# Patient Record
Sex: FEMALE | Race: WHITE | Employment: OTHER | URBAN - METROPOLITAN AREA
[De-identification: names, ages, dates, MRNs, and addresses within clinical notes are randomized per-mention and may not be internally consistent; named-entity substitution may affect disease eponyms.]

---

## 2018-06-28 ENCOUNTER — TRANSFERRED RECORDS (OUTPATIENT)
Dept: HEALTH INFORMATION MANAGEMENT | Facility: CLINIC | Age: 63
End: 2018-06-28

## 2018-08-06 ENCOUNTER — TRANSFERRED RECORDS (OUTPATIENT)
Dept: HEALTH INFORMATION MANAGEMENT | Facility: CLINIC | Age: 63
End: 2018-08-06

## 2018-08-09 ENCOUNTER — TRANSFERRED RECORDS (OUTPATIENT)
Dept: HEALTH INFORMATION MANAGEMENT | Facility: CLINIC | Age: 63
End: 2018-08-09

## 2018-08-09 ENCOUNTER — MEDICAL CORRESPONDENCE (OUTPATIENT)
Dept: HEALTH INFORMATION MANAGEMENT | Facility: CLINIC | Age: 63
End: 2018-08-09

## 2018-08-22 ENCOUNTER — PRE VISIT (OUTPATIENT)
Dept: OTOLARYNGOLOGY | Facility: CLINIC | Age: 63
End: 2018-08-22

## 2018-08-22 NOTE — TELEPHONE ENCOUNTER
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 Steen  Who called:Patient    Recent Studies  Imaging:  Pathology:  Labs:  Previous chemo/radiation (if known):    Slides requested from [facility]ly:  Records received from:    Additional information:Records from Alexy sent to HIM for scanning    Images in PACS

## 2018-08-23 PROCEDURE — 00000346 ZZHCL STATISTIC REVIEW OUTSIDE SLIDES TC 88321: Performed by: OTOLARYNGOLOGY

## 2018-08-24 LAB — COPATH REPORT: NORMAL

## 2018-08-29 ENCOUNTER — HEALTH MAINTENANCE LETTER (OUTPATIENT)
Age: 63
End: 2018-08-29

## 2018-09-05 ENCOUNTER — OFFICE VISIT (OUTPATIENT)
Dept: OTOLARYNGOLOGY | Facility: CLINIC | Age: 63
End: 2018-09-05
Payer: COMMERCIAL

## 2018-09-05 ENCOUNTER — ALLIED HEALTH/NURSE VISIT (OUTPATIENT)
Dept: SPEECH THERAPY | Facility: CLINIC | Age: 63
End: 2018-09-05

## 2018-09-05 VITALS — WEIGHT: 210 LBS | BODY MASS INDEX: 34.99 KG/M2 | HEIGHT: 65 IN

## 2018-09-05 DIAGNOSIS — C05.0 CANCER OF HARD PALATE (H): Primary | ICD-10-CM

## 2018-09-05 RX ORDER — LISINOPRIL/HYDROCHLOROTHIAZIDE 10-12.5 MG
1 TABLET ORAL EVERY MORNING
COMMUNITY
Start: 2018-09-04 | End: 2018-10-29

## 2018-09-05 RX ORDER — ASPIRIN 325 MG
325 TABLET, DELAYED RELEASE (ENTERIC COATED) ORAL EVERY MORNING
COMMUNITY
End: 2018-10-29

## 2018-09-05 RX ORDER — NICOTINE 21 MG/24HR
1 PATCH, TRANSDERMAL 24 HOURS TRANSDERMAL EVERY 24 HOURS
Refills: 6 | COMMUNITY
Start: 2018-08-14 | End: 2018-10-29

## 2018-09-05 RX ORDER — OMEPRAZOLE 20 MG
1000 TABLET, DELAYED RELEASE (ENTERIC COATED) ORAL 2 TIMES DAILY
COMMUNITY
Start: 2016-09-12 | End: 2018-10-23

## 2018-09-05 ASSESSMENT — PAIN SCALES - GENERAL: PAINLEVEL: NO PAIN (0)

## 2018-09-05 NOTE — PROGRESS NOTES
Patient seen for allied healthcare provider visit.  Introduced self and SLP services for following surgery.  Patient reports no difficulty swallowing at this time.  Patient agreeable to speech pathology services for swallowing and speech if problems occur due to reconstruction.  Patient reports feeling a little overwhelmed with the information provided by physician this date.  Anticipate full clinical swallow evaluation following surgery as cleared by ENT.  Time spent with patient 4 minutes.

## 2018-09-05 NOTE — MR AVS SNAPSHOT
After Visit Summary   9/5/2018    Ms. Martita Burden    MRN: [id number]           Patient Information     Date Of Birth          1955        Visit Information        Provider Department      9/5/2018 3:49 PM Suzan Gonzalez SLP [facility]         Follow-ups after your visit        Your next 10 appointments already scheduled     Sep 17, 2018 10:00 AM CDT   (Arrive by 9:45 AM)   PAC EVALUATION with PARVEZ [facility] ([facility])    [address] [place] [address]  4th [facility] [address] [phone]              Who to contact     Please call your clinic at [phone] to:    Ask questions about your health    Make or cancel appointments    Discuss your medicines    Learn about your test results    Speak to your doctor            Additional Information About Your Visit        MyChart Information     Mechiot gives you secure access to your electronic health record. If you see a primary care provider, you can also send messages to your care team and make appointments. If you have questions, please call your primary care clinic.  If you do not have a primary care provider, please call [phone] and they will assist you.      LoopIt is an electronic gateway that provides easy, online access to your medical records. With LoopIt, you can request a clinic appointment, read your test results, renew a prescription or communicate with your care team.     To access your existing account, please contact your [facility] or call [phone] for assistance.        Care EveryWhere ID     This is your Care EveryWhere ID. This could be used by other organizations to access your [facility] medical records  [id number]         Blood Pressure from Last 3 Encounters:   No data found for BP    Weight from Last 3 Encounters:   09/05/18 95.3 kg (210 lb)              Today, you had the following      No orders found for display       Primary Care Provider    None Specified       No primary provider on file.        Equal Access to Services     JJ CARRION : Hadii aad ku hadsaundraharpal Newell, wanader antonio, benjikathie downeyjhonnybola ding, karolina carlsonjoecarlota urena. So [facility] [phone].    ATENCIÓN: Si habla español, tiene a robison disposición servicios gratuitos de asistencia lingüística. Llame al [phone].    We comply with applicable federal civil rights laws and [place] laws. We do not discriminate on the basis of race, color, national origin, age, disability, sex, sexual orientation, or gender identity.            Thank you!     Thank you for choosing [facility]  for your care. Our goal is always to provide you with excellent care. Hearing back from our patients is one way we can continue to improve our services. Please take a few minutes to complete the written survey that you may receive in the mail after your visit with us. Thank you!             Your Updated Medication List - Protect others around you: Learn how to safely use, store and throw away your medicines at www.disposemymeds.org.          This list is accurate as of 9/5/18  3:51 PM.  Always use your most recent med list.                   Brand Name Dispense Instructions for use Diagnosis    aspirin 325 MG EC tablet           Calcium-Magnesium-Zinc 333-133-5 MG Tabs per tablet           CVS FLAXSEED OIL 1000 MG Caps           lisinopril-hydrochlorothiazide 10-12.5 MG per tablet    PRINZIDE/ZESTORETIC          nicotine 14 MG/24HR 24 hr patch    NICODERM CQ

## 2018-09-05 NOTE — PATIENT INSTRUCTIONS
1. We will call you on Friday after tumor board with recommendations as well as to confirm time for surgery.   2. Please call the ENT clinic with any questions,concerns, new or worsening symptoms.    -Clinic number is [phone]   - Camryn's direct line (Dr. Garza's nurse) [phone]    3. We have you scheduled to return to clinic to meet with Dr. Gutierrez on Monday 9/17 at 10:20am.    4.You are also scheduled with our pre-op clinic on 9/17 at 11:00am

## 2018-09-05 NOTE — NURSING NOTE
Teaching Flowsheet - ENT   Relevant Diagnosis: cancer of hard palate  Teaching Topic: right maxillectomy, right neck exploration, radial forearm free-flap  Person(s) involved in teaching: patient        Motivation Level:  Asks Questions:   Yes  Eager to Learn:   Yes  Cooperative:   Yes  Receptive (willing/able to accept information):   Yes  Comments: Reviewed pre-op H and P,  NPO prior to  surgery,  pre-op scrub (given Hibiclens)  Reviewed post-op  cares , activity and pain.     Patient demonstrates understanding of the following:  Reason for the appointment, diagnosis and treatment plan:   Yes  Knowledge of proper use of medications and conditions for which they are ordered (with special attention to potential side effects or drug interactions):  stop aspirin products 1 week before surgery Yes  Which situations necessitate calling provider and whom to contact:   Yes  Nutritional needs and diet plan:   Yes  Pain management techniques:   Yes  Patient instructed on hand hygiene:  Yes  How and/when to access community resources:   Yes     Infection Prevention:  Patient   demonstrates understanding of the following:  Surgical procedure site care taught Yes  Signs and symptoms of infection taught Yes  Wound care taught Yes  Instructional Materials Used/Given: pre- op booklet,verbal  Instruction.    Camryn Rivera, RN, BSN

## 2018-09-05 NOTE — NURSING NOTE
Chief Complaint   Patient presents with     Consult     new adenocarcinoma of hard palate.      Matthew Mott, EMT

## 2018-09-05 NOTE — LETTER
9/5/2018       RE: Martita Burden  [address] [facility] [address]     Dear Colleague,    Thank you for referring your patient, Martita Burden, to the [facility] EAR NOSE AND THROAT at [facility]. Please see a copy of my visit note below.    Dear Dr. Steen:    I had the pleasure of meeting Martita Burden in consultation today at the [facility] at your request.     History of Present Illness:   Patient is a 62-year-old woman who is referred for evaluation of a right palate mass.  She says that this was initially noticed by her dentist about a year ago.  She had some issues with her insurance allowing her to be seen.  This sounds like this was both here as well as locally (unclear if this was for ENT clinic versus oral surgery).  She was then seen by Dr. Steen and was going to undergo a maxillectomy.  Unfortunately her insurance company would not cover her obturator..  She was referred down here for possible maxillectomy with free flap reconstruction.  She says that there has not been any significant changes in the mass since it was first noticed.  Is not causing her any pain.  It is not interfering with her ability to eat.  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  She had a PET CT scan which showed a mass in the palate but no nodes.  There was bony erosion of the maxillary sinus floor.  Next    The patient is right-handed.    Family history: Negative    Social history: She was previously a 2.5 pack per day smoker and is currently doing about 5 cigarettes per day with use of the patch.  She denies any chewing tobacco use.  She denies any alcohol use.  She lives in the same building as her daughter and grandchildren.  She works in a greenhouse.    Past medical history: Hypertension    Past surgical history: Left ankle fusion, hysterectomy        MEDICATIONS:     Current Outpatient  Prescriptions   Medication Sig Dispense Refill     aspirin 325 MG EC tablet        Calcium-Magnesium-Zinc 333-133-5 MG TABS per tablet        Flaxseed, Linseed, (CVS FLAXSEED OIL) 1000 MG CAPS        lisinopril-hydrochlorothiazide (PRINZIDE/ZESTORETIC) 10-12.5 MG per tablet        nicotine (NICODERM CQ) 14 MG/24HR 24 hr patch   6       ALLERGIES:    Allergies   Allergen Reactions     Penicillins      Sulfa Drugs        HABITS/SOCIAL HISTORY:   She was previously a 2.5 pack per day smoker and is currently doing about 5 cigarettes per day with use of the patch.  She denies any chewing tobacco use.  She denies any alcohol use.   She lives in the same building as her daughter and grandchildren.    She works in a greenhouse.    Social History     Social History     Marital status: Single     Spouse name: N/A     Number of children: N/A     Years of education: N/A     Occupational History     Not on file.     Social History Main Topics     Smoking status: Light Tobacco Smoker     Packs/day: 0.50     Years: 30.00     Types: Cigarettes     Smokeless tobacco: Never Used     Alcohol use No     Drug use: No     Sexual activity: No     Other Topics Concern     Not on file     Social History Narrative     No narrative on file       PAST MEDICAL HISTORY:   Past Medical History:   Diagnosis Date     Allergic rhinitis     Seasonal     Cancer (H) April     Hypertension 10 yrs ago        PAST SURGICAL HISTORY:   Past Surgical History:   Procedure Laterality Date     GYN SURGERY      Many many years ago     ORTHOPEDIC SURGERY  2/4/2017    Repaired broken ankle       FAMILY HISTORY:    Family History   Problem Relation Age of Onset     Cancer Father      Cancer Brother      Hypertension Mother      Pretty much the whole family       REVIEW OF SYSTEMS:  12 point ROS was negative other than the symptoms noted above in the HPI.  Patient Supplied Answers to Review of Systems  [facility] ENT ROS 8/27/2018   Constitutional Appetite change  "  Musculoskeletal Swollen legs/feet   Allergy/Immunology Allergies or hay fever         PHYSICAL EXAMINATION:   Ht 1.651 m (5' 5\")  Wt 95.3 kg (210 lb)  BMI 34.95 kg/m2   Appearance:   normal; NAD, age-appropriate appearance, obese   Communication:   normal; communicates verbally, normal voice quality   Head/Face:   inspection -  Normal; no scars or visible lesions   Salivary glands -  Normal size, no tenderness, swelling, or palpable masses   Facial strength -  Normal and symmetric bilateral; H/B I/VI   Skin:  normal, no rash   Ocular Motility:  normal occular movements   Ears:  auricle (AD) -  normal  EAC (AD) -  normal  TM (AD) -  Normal, no effusion  auricle (AS) -  normal  EAC (AS) -  normal  TM (AS) -  Normal, no effusion  Normal clinical speech reception   Nose:  Ext. inspection -  Normal   Oral Cavity:  lips -  Normal mucosa, oral competence, and stoma size  Edentulous, healthy gingival mucosa   Hard palate with approximately 2-1/2 cm mass on the right side that does not extend to the anterior alveolar arch, it is slightly firm in areas but fleshy and soft and others, it does extend to the midline, it does not extend to the soft palate   Tongue - normal movement, no lesions   Oropharynx:  mucosa -  Normal, no lesions  soft palate -  Normal, no lesions, no asymmetry, normal elevation, no involvement of tumor   Neck: No visible mass or asymmetry   Normal palpation, no tenderness, no tracheal deviation  Normal range of motion   Lymphatic:  no abnormal nodes   Cardiovascular:  warm, pink, well-perfused extremities without swelling, tenderness, or edema   Respiratory:  Normal respiratory effort, no stridor   Neuro/Psych.:  mood/affect -  normal  mental status -  normal  cranial nerves -  normal      Extremities:  Adequate ulnar collateral flow and Carter's testing of the left forearm       RESULTS REVIEWED:   I reviewed the referral records from [place] which are summarized above.  I reviewed the pathology " report.    I independently reviewed the PET CT scan images.  This shows the mass in the hard palate on the right side with bony erosion present without any lymphadenopathy.      IMPRESSION AND PLAN:   Patient is a 62-year-old woman who has a hard palate mass consistent with a likely low-grade malignancy.  We discussed the management would be with surgical excision with a maxillectomy.  She has already discussed this with her local ENT but unfortunately her insurance does not cover an obturator.  We discussed that the alternative to an obturator would be a free flap reconstruction.  Options for reconstruction would likely be a left radial forearm free flap versus a fibula if we decided to proceed with bony reconstruction (no CTA but unable to have left fibula likely due to previous fusion).    Microvascular reconstructive techniques were counseled to the patient. In this procedure, tissue is harvested with its associated blood supply from a distant site of the body, and then transplanted to the wound. The blood supply is then sutured with the aid of a microscope to an artery and vein near the wound so that the tissue can survive in its new environment. The main risk of the surgery is insult to the connected artery and vein, such as by a blood clot, which can then lead to flap death. In the case that a blood clot is detected, the patient will be taken directly to the operating room in an effort to salvage the flap tissue. The risk of complete flap failure is estimated in the literature at between 1-5%. Other risks of surgery include hematoma, infection, donor site weakness, scarring, and poor cosmesis. The patient was counseled that he will be hospitalized for 7 days.  She will have a feeding tube in place and will be NPO for approximately 2 weeks.    We discussed that she would have a likely lateral rhinotomy incision along with intraoral incisions for removal of the mass.  She would have a right neck incision for  the neck exploration for free flap vessels.  She also had an incision on her donor site and a split-thickness skin graft from the leg.  We discussed that she would be have a cast on while she is in the hospital and then would require wound care when she leaves the hospital for her donor site.  She may be able to provide us care for herself along with the tube feedings but if she struggles with it and her daughter is unable to provide her with assistance then she may require temporary placement in a care facility or nursing home.    She does currently live up in [place] and requests that surgery be performed prior to significant snowfall up there as she has difficulty with transportation down here as it is a 5 hour drive.  We will work on scheduling her for an appointment with Dr. Gutierrez for consultation given that this will be performed in a 2 team approach.  Additionally we will try to arrange her to have a PAC appointment on the same day as her consultation.      Thank you very much for the opportunity to participate in the care of your patient.      Leslie Garza M.D.  Otolaryngology- Head & Neck Surgery          CC:  Tim Steen MD  [address] [address]

## 2018-09-05 NOTE — MR AVS SNAPSHOT
After Visit Summary   9/5/2018    Ms. Martita Burden    MRN: [id number]           Patient Information     Date Of Birth          1955        Visit Information        Provider Department      9/5/2018 2:40 PM Leslie Garza MD [facility] Ear Nose and Throat        Today's Diagnoses     Cancer of hard palate (H)    -  1      Care Instructions    1. We will call you on Friday after tumor board with recommendations as well as to confirm time for surgery.   2. Please call the ENT clinic with any questions,concerns, new or worsening symptoms.    -Clinic number is [phone]   - Camryn's direct line (Dr. Garza's nurse) [phone]    3. We have you scheduled to return to clinic to meet with Dr. Gutierrez on Monday 9/17 at 10:20am.    4.You are also scheduled with our pre-op clinic on 9/17 at           Follow-ups after your visit        Additional Services     PAC Visit Referral (For [facility] Only)       Does this visit require an Anesthesia consult?  No -  If this visit is not for evaluation for co-morbidity (such as patient request due to anxiety), what is the purpose of the visit?:     H&P done by:  PAC      Please be aware that coverage of these services is subject to the terms and limitations of your health insurance plan.  Call member services at your health plan with any benefit or coverage questions.      Please bring the following to your appointment:  >>   Any x-rays, CTs or MRIs which have been performed.  Contact the facility where they were done to arrange for  prior to your scheduled appointment.  Any new CT, MRI or other procedures ordered by your specialist must be performed at a [facility] facility or coordinated by your clinic's referral office.    >>   List of current medications  >>   This referral request   >>   Any documents/labs given to you for this referral                  Your next 10 appointments already scheduled     Sep 17, 2018 11:00 AM CDT   (Arrive by 10:45 AM)  "  PAC EVALUATION with PARVEZ [facility] ([facility])    [address]  4th Floor  [facility] [address] [phone]              Who to contact     Please call your clinic at [phone] to:    Ask questions about your health    Make or cancel appointments    Discuss your medicines    Learn about your test results    Speak to your doctor            Additional Information About Your Visit        [employer] Information     Reppify gives you secure access to your electronic health record. If you see a primary care provider, you can also send messages to your care team and make appointments. If you have questions, please call your primary care clinic.  If you do not have a primary care provider, please call [phone] and they will assist you.      Reppify is an electronic gateway that provides easy, online access to your medical records. With Reppify, you can request a clinic appointment, read your test results, renew a prescription or communicate with your care team.     To access your existing account, please contact your [facility] or call [phone] for assistance.        Care EveryWhere ID     This is your Care EveryWhere ID. This could be used by other organizations to access your [facility] medical records  [id number]        Your Vitals Were     Height BMI (Body Mass Index)                1.651 m (5' 5\") 34.95 kg/m2           Blood Pressure from Last 3 Encounters:   No data found for BP    Weight from Last 3 Encounters:   09/05/18 95.3 kg (210 lb)              We Performed the Following     IMAGESTREAM RECORDING ORDER     PAC Visit Referral (For [facility] Only)     Radha-Operative Worksheet (Head & Neck)        Primary Care Provider    None Specified       No primary provider on file.        Equal Access to Services     JJ CARRION : nabil Chaves qaybta " karolina roacharlotte urena. [facility] [phone].    ATENCIÓN: Si scarlet wayne, tiene a robison disposición servicios gratuitos de asistencia lingüística. Moses al [phone].    We comply with applicable federal civil rights laws and [place] laws. We do not discriminate on the basis of race, color, national origin, age, disability, sex, sexual orientation, or gender identity.            Thank you!     Thank you for choosing [facility]  for your care. Our goal is always to provide you with excellent care. Hearing back from our patients is one way we can continue to improve our services. Please take a few minutes to complete the written survey that you may receive in the mail after your visit with us. Thank you!             Your Updated Medication List - Protect others around you: Learn how to safely use, store and throw away your medicines at www.disposemymeds.org.          This list is accurate as of 9/5/18  3:55 PM.  Always use your most recent med list.                   Brand Name Dispense Instructions for use Diagnosis    aspirin 325 MG EC tablet           Calcium-Magnesium-Zinc 333-133-5 MG Tabs per tablet           CVS FLAXSEED OIL 1000 MG Caps           lisinopril-hydrochlorothiazide 10-12.5 MG per tablet    PRINZIDE/ZESTORETIC          nicotine 14 MG/24HR 24 hr patch    NICODERM CQ

## 2018-09-06 ENCOUNTER — DOCUMENTATION ONLY (OUTPATIENT)
Dept: OTOLARYNGOLOGY | Facility: CLINIC | Age: 63
End: 2018-09-06

## 2018-09-06 RX ORDER — CLINDAMYCIN PHOSPHATE 600 MG/50ML
600 INJECTION, SOLUTION INTRAVENOUS
Status: CANCELLED | OUTPATIENT
Start: 2018-09-20

## 2018-09-06 NOTE — PROGRESS NOTES
Patient is scheduled for a combos urgery with Minda Garza and Yun on 9/20/18  OR [facility]. Patient will return to see Dr Gutierrez on 9/17 in ENT clinic.  Case will be discussed at Tumor Board on 9/7/18.  Nurse coordinator will call patient with all information after TB on 9/7.

## 2018-09-07 ENCOUNTER — TEAM CONFERENCE (OUTPATIENT)
Dept: OTOLARYNGOLOGY | Facility: CLINIC | Age: 63
End: 2018-09-07

## 2018-09-07 DIAGNOSIS — C05.0 MALIGNANT NEOPLASM OF HARD PALATE (H): Primary | ICD-10-CM

## 2018-09-07 NOTE — TELEPHONE ENCOUNTER
Head & Neck Tumor Conference Note  September 7, 2018    Status: New  Staff: Kayla    Tumor Site: Hard palate  Tumor Stage:     Brief History: Ms. Burden is a 62 year old female with a history of a hard palate lesion. Was initially recommended to have a maxillectomy with obturator placement, however insurance unable to cover obturator.   Reason for Review: Review path, imaging, POC    Imaging:   PET 7/28  Right hard palate mass with erosion of the right maxilla and the floor of the nose measuring 2 x 2 x 3 cm  Pathology:   Path OSH 6/28  Right maxilla - clear cell carcinoma    Path consult 8/23: Right upper gingiva  - - Squamous mucosa with hyperkeratosis, negative for dysplasia or   malignancy.     Tumor Board Recommendation:   CT angio lower extremities   Double check on insurance  Possible right maxillectomy with neck exploration for vessels and free flap

## 2018-09-07 NOTE — TELEPHONE ENCOUNTER
Called patient with tumor board recommendations. She will plan to return to clinic on Monday 9/17 to meet with Dr. Gutierrez and PAC appointment. Patient is scheduled for CTA following appointment with Dr. Gutierrez. Surgery scheduled for 9/20. Patient agreeable to plan and will call with further questions or concerns.    Camryn Rivera, RN, BSN

## 2018-09-07 NOTE — PROGRESS NOTES
Dear Dr. Steen:    I had the pleasure of meeting Martita Burden in consultation today at the [facility] at your request.     History of Present Illness:   Patient is a 62-year-old woman who is referred for evaluation of a right palate mass.  She says that this was initially noticed by her dentist about a year ago.  She had some issues with her insurance allowing her to be seen.  This sounds like this was both here as well as locally (unclear if this was for ENT clinic versus oral surgery).  She was then seen by Dr. Steen and was going to undergo a maxillectomy.  Unfortunately her insurance company would not cover her obturator..  She was referred down here for possible maxillectomy with free flap reconstruction.  She says that there has not been any significant changes in the mass since it was first noticed.  Is not causing her any pain.  It is not interfering with her ability to eat.  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  She had a PET CT scan which showed a mass in the palate but no nodes.  There was bony erosion of the maxillary sinus floor.  Next    The patient is right-handed.    Family history: Negative    Social history: She was previously a 2.5 pack per day smoker and is currently doing about 5 cigarettes per day with use of the patch.  She denies any chewing tobacco use.  She denies any alcohol use.  She lives in the same building as her daughter and grandchildren.  She works in a greenhouse.    Past medical history: Hypertension    Past surgical history: Left ankle fusion, hysterectomy        MEDICATIONS:     Current Outpatient Prescriptions   Medication Sig Dispense Refill     aspirin 325 MG EC tablet        Calcium-Magnesium-Zinc 333-133-5 MG TABS per tablet        Flaxseed, Linseed, ([employer] FLAXSEED OIL) 1000 MG CAPS        lisinopril-hydrochlorothiazide (PRINZIDE/ZESTORETIC) 10-12.5 MG per tablet     "    nicotine (NICODERM CQ) 14 MG/24HR 24 hr patch   6       ALLERGIES:    Allergies   Allergen Reactions     Penicillins      Sulfa Drugs        HABITS/SOCIAL HISTORY:   She was previously a 2.5 pack per day smoker and is currently doing about 5 cigarettes per day with use of the patch.  She denies any chewing tobacco use.  She denies any alcohol use.   She lives in the same building as her daughter and grandchildren.    She works in a greenhouse.    Social History     Social History     Marital status: Single     Spouse name: N/A     Number of children: N/A     Years of education: N/A     Occupational History     Not on file.     Social History Main Topics     Smoking status: Light Tobacco Smoker     Packs/day: 0.50     Years: 30.00     Types: Cigarettes     Smokeless tobacco: Never Used     Alcohol use No     Drug use: No     Sexual activity: No     Other Topics Concern     Not on file     Social History Narrative     No narrative on file       PAST MEDICAL HISTORY:   Past Medical History:   Diagnosis Date     Allergic rhinitis     Seasonal     Cancer (H) April     Hypertension 10 yrs ago        PAST SURGICAL HISTORY:   Past Surgical History:   Procedure Laterality Date     GYN SURGERY      Many many years ago     ORTHOPEDIC SURGERY  2/4/2017    Repaired broken ankle       FAMILY HISTORY:    Family History   Problem Relation Age of Onset     Cancer Father      Cancer Brother      Hypertension Mother      Pretty much the whole family       REVIEW OF SYSTEMS:  12 point ROS was negative other than the symptoms noted above in the HPI.  Patient Supplied Answers to Review of Systems  [facility] ROS 8/27/2018   Constitutional Appetite change   Musculoskeletal Swollen legs/feet   Allergy/Immunology Allergies or hay fever         PHYSICAL EXAMINATION:   Ht 1.651 m (5' 5\")  Wt 95.3 kg (210 lb)  BMI 34.95 kg/m2   Appearance:   normal; NAD, age-appropriate appearance, obese   Communication:   normal; communicates verbally, " normal voice quality   Head/Face:   inspection -  Normal; no scars or visible lesions   Salivary glands -  Normal size, no tenderness, swelling, or palpable masses   Facial strength -  Normal and symmetric bilateral; H/B I/VI   Skin:  normal, no rash   Ocular Motility:  normal occular movements   Ears:  auricle (AD) -  normal  EAC (AD) -  normal  TM (AD) -  Normal, no effusion  auricle (AS) -  normal  EAC (AS) -  normal  TM (AS) -  Normal, no effusion  Normal clinical speech reception   Nose:  Ext. inspection -  Normal   Oral Cavity:  lips -  Normal mucosa, oral competence, and stoma size  Edentulous, healthy gingival mucosa   Hard palate with approximately 2-1/2 cm mass on the right side that does not extend to the anterior alveolar arch, it is slightly firm in areas but fleshy and soft and others, it does extend to the midline, it does not extend to the soft palate   Tongue - normal movement, no lesions   Oropharynx:  mucosa -  Normal, no lesions  soft palate -  Normal, no lesions, no asymmetry, normal elevation, no involvement of tumor   Neck: No visible mass or asymmetry   Normal palpation, no tenderness, no tracheal deviation  Normal range of motion   Lymphatic:  no abnormal nodes   Cardiovascular:  warm, pink, well-perfused extremities without swelling, tenderness, or edema   Respiratory:  Normal respiratory effort, no stridor   Neuro/Psych.:  mood/affect -  normal  mental status -  normal  cranial nerves -  normal      Extremities:  Adequate ulnar collateral flow and Carter's testing of the left forearm       RESULTS REVIEWED:   I reviewed the referral records from [facility] which are summarized above.  I reviewed the pathology report.    I independently reviewed the PET CT scan images.  This shows the mass in the hard palate on the right side with bony erosion present without any lymphadenopathy.      IMPRESSION AND PLAN:   Patient is a 62-year-old woman who has a hard palate mass consistent with a likely  low-grade malignancy.  We discussed the management would be with surgical excision with a maxillectomy.  She has already discussed this with her local ENT but unfortunately her insurance does not cover an obturator.  We discussed that the alternative to an obturator would be a free flap reconstruction.  Options for reconstruction would likely be a left radial forearm free flap versus a fibula if we decided to proceed with bony reconstruction (no CTA but unable to have left fibula likely due to previous fusion).    Microvascular reconstructive techniques were counseled to the patient. In this procedure, tissue is harvested with its associated blood supply from a distant site of the body, and then transplanted to the wound. The blood supply is then sutured with the aid of a microscope to an artery and vein near the wound so that the tissue can survive in its new environment. The main risk of the surgery is insult to the connected artery and vein, such as by a blood clot, which can then lead to flap death. In the case that a blood clot is detected, the patient will be taken directly to the operating room in an effort to salvage the flap tissue. The risk of complete flap failure is estimated in the literature at between 1-5%. Other risks of surgery include hematoma, infection, donor site weakness, scarring, and poor cosmesis. The patient was counseled that he will be hospitalized for 7 days.  She will have a feeding tube in place and will be NPO for approximately 2 weeks.    We discussed that she would have a likely lateral rhinotomy incision along with intraoral incisions for removal of the mass.  She would have a right neck incision for the neck exploration for free flap vessels.  She also had an incision on her donor site and a split-thickness skin graft from the leg.  We discussed that she would be have a cast on while she is in the hospital and then would require wound care when she leaves the hospital for her  donor site.  She may be able to provide us care for herself along with the tube feedings but if she struggles with it and her daughter is unable to provide her with assistance then she may require temporary placement in a care facility or nursing home.    She does currently live up in [place] and requests that surgery be performed prior to significant snowfall up there as she has difficulty with transportation down here as it is a 5 hour drive.  We will work on scheduling her for an appointment with Dr. Gutierrez for consultation given that this will be performed in a 2 team approach.  Additionally we will try to arrange her to have a PAC appointment on the same day as her consultation.      Thank you very much for the opportunity to participate in the care of your patient.      Leslie Garza M.D.  Otolaryngology- Head & Neck Surgery          CC:  Tim Steen MD  [address] [address]

## 2018-09-17 ENCOUNTER — OFFICE VISIT (OUTPATIENT)
Dept: SURGERY | Facility: CLINIC | Age: 63
End: 2018-09-17
Payer: COMMERCIAL

## 2018-09-17 ENCOUNTER — OFFICE VISIT (OUTPATIENT)
Dept: OTOLARYNGOLOGY | Facility: CLINIC | Age: 63
End: 2018-09-17
Payer: COMMERCIAL

## 2018-09-17 ENCOUNTER — ANESTHESIA EVENT (OUTPATIENT)
Dept: SURGERY | Facility: CLINIC | Age: 63
End: 2018-09-17
Payer: COMMERCIAL

## 2018-09-17 VITALS
DIASTOLIC BLOOD PRESSURE: 83 MMHG | OXYGEN SATURATION: 96 % | HEIGHT: 65 IN | WEIGHT: 211.5 LBS | SYSTOLIC BLOOD PRESSURE: 143 MMHG | TEMPERATURE: 98 F | BODY MASS INDEX: 35.24 KG/M2 | RESPIRATION RATE: 16 BRPM | HEART RATE: 78 BPM

## 2018-09-17 VITALS — WEIGHT: 211 LBS | HEIGHT: 65 IN | BODY MASS INDEX: 35.16 KG/M2

## 2018-09-17 DIAGNOSIS — Z01.818 PREOP GENERAL PHYSICAL EXAM: Primary | ICD-10-CM

## 2018-09-17 DIAGNOSIS — C05.0 MALIGNANT NEOPLASM OF HARD PALATE (H): Primary | ICD-10-CM

## 2018-09-17 DIAGNOSIS — Z01.818 PREOP GENERAL PHYSICAL EXAM: ICD-10-CM

## 2018-09-17 LAB
ANION GAP SERPL CALCULATED.3IONS-SCNC: 8 MMOL/L (ref 3–14)
BUN SERPL-MCNC: 17 MG/DL (ref 7–30)
CALCIUM SERPL-MCNC: 9.4 MG/DL (ref 8.5–10.1)
CHLORIDE SERPL-SCNC: 106 MMOL/L (ref 94–109)
CO2 SERPL-SCNC: 24 MMOL/L (ref 20–32)
CREAT SERPL-MCNC: 0.83 MG/DL (ref 0.52–1.04)
ERYTHROCYTE [DISTWIDTH] IN BLOOD BY AUTOMATED COUNT: 13.7 % (ref 10–15)
GFR SERPL CREATININE-BSD FRML MDRD: 69 ML/MIN/1.7M2
GLUCOSE SERPL-MCNC: 95 MG/DL (ref 70–99)
HCT VFR BLD AUTO: 44.5 % (ref 35–47)
HGB BLD-MCNC: 14.3 G/DL (ref 11.7–15.7)
INR PPP: 0.94 (ref 0.86–1.14)
MCH RBC QN AUTO: 31.8 PG (ref 26.5–33)
MCHC RBC AUTO-ENTMCNC: 32.1 G/DL (ref 31.5–36.5)
MCV RBC AUTO: 99 FL (ref 78–100)
PLATELET # BLD AUTO: 299 10E9/L (ref 150–450)
POTASSIUM SERPL-SCNC: 4.2 MMOL/L (ref 3.4–5.3)
RBC # BLD AUTO: 4.49 10E12/L (ref 3.8–5.2)
SODIUM SERPL-SCNC: 138 MMOL/L (ref 133–144)
WBC # BLD AUTO: 5.3 10E9/L (ref 4–11)

## 2018-09-17 PROCEDURE — 86923 COMPATIBILITY TEST ELECTRIC: CPT | Performed by: NURSE PRACTITIONER

## 2018-09-17 RX ORDER — SIMVASTATIN 40 MG
40 TABLET ORAL EVERY EVENING
COMMUNITY
End: 2018-10-29

## 2018-09-17 ASSESSMENT — LIFESTYLE VARIABLES: TOBACCO_USE: 1

## 2018-09-17 ASSESSMENT — PAIN SCALES - GENERAL: PAINLEVEL: NO PAIN (0)

## 2018-09-17 NOTE — LETTER
9/17/2018       RE: Martita Burden  [address] [facility] [address]     Dear Colleague,    Thank you for referring your patient, Martita Burden, to the [facility] at [facility]. Please see a copy of my visit note below.    HISTORY OF PRESENT ILLNESS:   Ms. Burden is seen at the request of Dr. Garza.  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  She is planned for an infrastructure maxillectomy and palatectomy with free tissue reconstruction.      PHYSICAL EXAMINATION:  On examination today, I identified the tumor in the oral cavity and indeed it is medial to the alveolus and somewhat posterior to the anterior alveolus as well and sitting near the central part of the palate.        ASSESSMENT AND PLAN:  The patient was seen in conjunction with Dr. Garza today so we could plan our cuts and reconstruction.  Based on the planned cuts, we will try to leave some of the central alveolus and take out the mid-portion of the palate.  In that case, I suspect that a forearm flap should suffice to seal this off.  The patient is comfortable with this.  I did discuss with her a fibula and the possibility that she could have implants, but she does not plan to get implants and only wants to have a denture.  Thus, I think a forearm will suffice in this case.  I discussed with her the risks of surgery which include but are not limited to bleeding, infection, return trips to the operating room, and possible flap loss.  I performed an Carter's test on the left arm that shows it is suitable for transfer.  She understands these risks and is willing to proceed.  I spent 20 minutes with the patient today, 15 of which was counseling and coordination of care.     Again, thank you for allowing me to participate in the care of your patient.       Sincerely,    Terrence Gutierrez MD

## 2018-09-17 NOTE — PATIENT INSTRUCTIONS
Preparing for Your Surgery      Name:  Martita Burden   MRN:  [id number]   :  1955   Today's Date:  2018     Arriving for surgery:  Surgery date:  18  Arrival time:  5:30 am    Please come to:  [facility] [address]  [address] [place]  [address]    -   parking is available in front of the hospital from 5:15 am to 8:00 pm    -  Stop at the Information Desk in the lobby    -   Inform the information person that you are here for surgery. An escort to 3C will be provided. If you would not like an escort, please proceed to 3C on the 3rd floor. [phone]     -  Bring your ID and insurance card.    What can I eat or drink?  -  You may have solid food or milk products until 8 hours prior to your surgery. (Until 11:30 pm 18 )  -  You may have water, apple juice, clear BLACK coffee (NO creamer or nondairy creamer), or 7up/Sprite until 2 hours prior to your surgery. (Until 5:30 am )    Which medicines can I take?       -  Do not bring your own medications to the hospital.        -  Follow Otolaryngology Clinic instructions regarding Ibuprofen. If no instructions given, NO Ibuprofen the day prior to surgery.         -  Aspirin and Flaxseed Oil currently on hold.    -  Do NOT take these medications in the morning, the day of surgery:  Lisinopril-Hydrochlorothiazide, Calcium    -  Please take these medications the morning of surgery:  Acetaminophen (Tylenol) if needed    How do I prepare myself?  -  Take two showers: one the night before surgery; and one the morning of surgery.         Use Scrubcare or Hibiclens to wash from neck down.  You may use your own shampoo and conditioner. No other hair products.   -  Do NOT use lotion, powder, colognes, deodorant, or antiperspirant the day of your surgery.  -  Do NOT wear any makeup, fingernail polish or jewelry.    Questions or Concerns:  If you have questions or concerns prior to your surgery, call [phone]. (Mon - Fri   8 am- 5:30 pm)  Questions after surgery, contact your Surgeons office.      AFTER YOUR SURGERY  Breathing exercises   Breathing exercises help you recover faster. Take deep breaths and let the air out slowly. This will:     Help you wake up after surgery.    Help prevent complications like pneumonia.  Preventing complications will help you go home sooner.   We may give you a breathing device (incentive spirometer) to encourage you to breathe deeply.   Nausea and vomiting   You may feel sick to your stomach after surgery; if so, let your nurse know.    Pain control:  After surgery, you may have pain. Our goal is to help you manage your pain. Pain medicine will help you feel comfortable enough to do activities that will help you heal.  These activities may include breathing exercises, walking and physical therapy.   To help your health care team treat your pain we will ask: 1) If you have pain  2) where it is located 3) describe your pain in your words  Methods of pain control include medications given by mouth, vein or by nerve block for some surgeries.  Sequential Compression Device (SCD) or Pneumo Boots:  You may need to wear SCD S on your legs or feet. These are wraps connected to a machine that pumps in air and releases it. The repeated pumping helps prevent blood clots from forming.

## 2018-09-17 NOTE — H&P
Pre-Operative H & P     CC:  Preoperative exam to assess for increased cardiopulmonary risk while undergoing surgery and anesthesia.    Date of Encounter: 9/17/2018  Primary Care Physician:  No primary care provider on file.    Reason for visit:  Preop general physical exam  Right palate mass      PATRICIA Burden is a 62 year old female who presents for pre-operative H & P in preparation for Right Maxillectomy, Right Neck Exploration, Right Forearm Free Flap, Split Thickness Skin Graft, Nasogastric Feeding Tube on 9/20/2018 by Dr. gomes and Matt in treatment of right palate mass at [facility].     History is obtained from the patient.   Right palate mass first discovered a year ago. She has had some issues with insurance coverage and finding a surgeon and it has taken some time to eventually have surgery.      Past Medical History  Past Medical History:   Diagnosis Date     Allergic rhinitis     Seasonal     Cancer (H) April     Hypertension 10 yrs ago       Past Surgical History  Past Surgical History:   Procedure Laterality Date     HYSTERECTOMY       ORTHOPEDIC SURGERY  2/4/2017    Repaired broken ankle       Hx of Blood transfusions/reactions: none    Hx of abnormal bleeding or anti-platelet use: asa, hold 5 days    Menstrual history: No LMP recorded. Patient has had a hysterectomy.:     Steroid use in the last year: none    Personal or FH with difficulty with Anesthesia:  None        Prior to Admission Medications  Current Outpatient Prescriptions   Medication Sig Dispense Refill     Acetaminophen (TYLENOL PO) Take 1,000 mg by mouth as needed for mild pain or fever       aspirin 325 MG EC tablet Take 325 mg by mouth every morning ON HOLD FOR SURGERY SINCE 09/10/2018       Calcium-Magnesium-Zinc 333-133-5 MG TABS per tablet Take 1 tablet by mouth 2 times daily (with meals)        Flaxseed, Linseed, ([employer] FLAXSEED OIL) 1000 MG CAPS Take 1,000 mg by mouth 2  times daily ON HOLD FOR SURGERY SINCE 09/10/2018       lisinopril-hydrochlorothiazide (PRINZIDE/ZESTORETIC) 10-12.5 MG per tablet Take 1 tablet by mouth every morning        nicotine (NICODERM CQ) 14 MG/24HR 24 hr patch Place 1 patch onto the skin every 24 hours   6     simvastatin (ZOCOR) 40 MG tablet Take 40 mg by mouth every evening         Allergies  Allergies   Allergen Reactions     Penicillins      Sulfa Drugs        Social History  Social History     Social History     Marital status: Single     Spouse name: N/A     Number of children: N/A     Years of education: N/A     Occupational History     Not on file.     Social History Main Topics     Smoking status: Light Tobacco Smoker     Packs/day: 0.50     Years: 30.00     Types: Cigarettes     Smokeless tobacco: Never Used      Comment: currently down to 5 cigs a day     Alcohol use No     Drug use: No     Sexual activity: No     Other Topics Concern     Not on file     Social History Narrative       Family History  Family History   Problem Relation Age of Onset     Cancer Father      Cancer Brother      Hypertension Mother      Pretty much the whole family           Anesthesia Evaluation     . Pt has had prior anesthetic. Type: General and MAC           ROS/MED HX    ENT/Pulmonary:     (+)tobacco use, Current use 0.5 PPD for 30 years, now down to 5 cigs a day packs/day  , . .    Neurologic:  - neg neurologic ROS     Cardiovascular:     (+) hypertension----. : . . . :. . Previous cardiac testing date:results:date: results:ECG reviewed date:8/6/2018 results:NSR, non-specific ST abnoramity date: results:          METS/Exercise Tolerance:  >4 METS   Hematologic:  - neg hematologic  ROS       Musculoskeletal:  - neg musculoskeletal ROS       GI/Hepatic:  - neg GI/hepatic ROS       Renal/Genitourinary:  - ROS Renal section negative       Endo:  - neg endo ROS       Psychiatric:  - neg psychiatric ROS       Infectious Disease:  - neg infectious disease ROS      "  Malignancy:   (+) Malignancy History of Other  Other CA palate Active status post         Other:    (+) No chance of pregnancy C-spine cleared: N/A, no H/O Chronic Pain,no other significant disability            Physical Exam  Normal systems: cardiovascular, pulmonary and dental    Airway   Mallampati: III  TM distance: >3 FB  Neck ROM: full    Dental   (+) missing  Comment: Only 6 lower teeth    Cardiovascular   Rhythm and rate: regular and normal      Pulmonary    breath sounds clear to auscultation          The complete review of systems is negative other than noted in the HPI or here.   Temp: 98  F (36.7  C) Temp src: Oral BP: 143/83 Pulse: 78   Resp: 16 SpO2: 96 %         211 lbs 8 oz  5' 5\"   Body mass index is 35.2 kg/(m^2).       Physical Exam  Constitutional: Awake, alert, cooperative, no apparent distress, and appears stated age.  Eyes: Pupils equal, round and reactive to light, extra ocular muscles intact, sclera clear, conjunctiva normal.  HENT: Normocephalic, oral pharynx with moist mucus membranes, good dentition, only 6 lower teeth. No goiter appreciated.   Respiratory: Clear to auscultation bilaterally, no crackles or wheezing.  Cardiovascular: Regular rate and rhythm, normal S1 and S2, and no murmur noted.  Carotids +2, no bruits. No edema. Palpable pulses to radial  DP and PT arteries.   GI: Normal bowel sounds, soft, non-distended, non-tender, no masses palpated, no hepatosplenomegaly.    Lymph/Hematologic: No cervical lymphadenopathy and no supraclavicular lymphadenopathy.  Genitourinary:  deferred   Skin: Warm and dry.  No rashes at anticipated surgical site.   Musculoskeletal: Full ROM of neck. There is no redness, warmth, or swelling of the joints. Gross motor strength is normal.    Neurologic: Awake, alert, oriented to name, place and time. Cranial nerves II-XII are grossly intact. Gait is normal.   Neuropsychiatric: Calm, cooperative. Normal affect.     Labs: (personally reviewed)  Results " for MARTITA HOLDER (MRN [id number]) as of 9/17/2018 15:29   Ref. Range 9/17/2018 12:15 9/17/2018 12:16   Sodium Latest Ref Range: 133 - 144 mmol/L  138   Potassium Latest Ref Range: 3.4 - 5.3 mmol/L  4.2   Chloride Latest Ref Range: 94 - 109 mmol/L  106   Carbon Dioxide Latest Ref Range: 20 - 32 mmol/L  24   Urea Nitrogen Latest Ref Range: 7 - 30 mg/dL  17   Creatinine Latest Ref Range: 0.52 - 1.04 mg/dL  0.83   GFR Estimate Latest Ref Range: >60 mL/min/1.7m2  69   GFR Estimate If Black Latest Ref Range: >60 mL/min/1.7m2  84   Calcium Latest Ref Range: 8.5 - 10.1 mg/dL  9.4   Anion Gap Latest Ref Range: 3 - 14 mmol/L  8   Glucose Latest Ref Range: 70 - 99 mg/dL  95   WBC Latest Ref Range: 4.0 - 11.0 10e9/L  5.3   Hemoglobin Latest Ref Range: 11.7 - 15.7 g/dL  14.3   Hematocrit Latest Ref Range: 35.0 - 47.0 %  44.5   Platelet Count Latest Ref Range: 150 - 450 10e9/L  299   RBC Count Latest Ref Range: 3.8 - 5.2 10e12/L  4.49   MCV Latest Ref Range: 78 - 100 fl  99   MCH Latest Ref Range: 26.5 - 33.0 pg  31.8   MCHC Latest Ref Range: 31.5 - 36.5 g/dL  32.1   RDW Latest Ref Range: 10.0 - 15.0 %  13.7   INR Latest Ref Range: 0.86 - 1.14   0.94   ABO Unknown O    RH(D) Unknown Pos    Antibody Screen Unknown Neg    Test Valid Only At Latest Units:     [facility].    Specimen Expires Unknown 09/20/2018        EKG: Personally reviewed but formal cardiology read pending:       Outside records reviewed from: [facility]      ASSESSMENT and PLAN  Martita Holder is a 62 year old female scheduled to undergo Right Maxillectomy, Right Neck Exploration, Right Forearm Free Flap, Split Thickness Skin Graft, Nasogastric Feeding Tube on 9/20/2018 by Dr. gomes and Matt in treatment of right palate mass. She has the following specific operative considerations:   - RCRI : Low serious cardiac risks.  0.4% risk of major adverse cardiac event.   - Anesthesia considerations:  Refer to PAC assessment in anesthesia records  -  VTE risk: 3 %  - TIFFANY # of risks 3/8 = intermediate risk  - Post-op delirium risk: high risk due to age  - Risk of PONV score = 2.  If > 2, anti-emetic intervention recommended.     Previous anesthesia without complications  1) Cardiac: HTN. METS well over 4 without cardiac symptoms. Denies chest pain, PND, othopnea. No edema. EKG 8/6/2018 NSR with nonspecific ST abnormality   2) Pulmonary: Smoked 0.5 PPD for 30 years, now down to 5 cigarettes a day and using nicotine patch.  3) Onc: Right palate mass first discovered a year ago. She has had some issues with insurance coverage and finding a surgeon and it has taken some time to eventually have surgery.   4) Endo:  BMI 35        I spent 30 minutes with patient, greater than 50% educating on preop meds, counseling on anesthesia and coordinating care for maxillectomy  Pt optimized for surgery. AVS with information on surgery time/arrival time, meds and NPO status given by nursing staff      Patient was discussed with Dr Perkins.    PARVEZ Jordan CNS  Preoperative Assessment Center  [facility]  Phone: [phone]  Fax: [phone]

## 2018-09-17 NOTE — PROGRESS NOTES
HISTORY OF PRESENT ILLNESS:   Ms. Burden is seen at the request of Dr. Garza.  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  She is planned for an infrastructure maxillectomy and palatectomy with free tissue reconstruction.      PHYSICAL EXAMINATION:  On examination today, I identified the tumor in the oral cavity and indeed it is medial to the alveolus and somewhat posterior to the anterior alveolus as well and sitting near the central part of the palate.        ASSESSMENT AND PLAN:  The patient was seen in conjunction with Dr. Garza today so we could plan our cuts and reconstruction.  Based on the planned cuts, we will try to leave some of the central alveolus and take out the mid-portion of the palate.  In that case, I suspect that a forearm flap should suffice to seal this off.  The patient is comfortable with this.  I did discuss with her a fibula and the possibility that she could have implants, but she does not plan to get implants and only wants to have a denture.  Thus, I think a forearm will suffice in this case.  I discussed with her the risks of surgery which include but are not limited to bleeding, infection, return trips to the operating room, and possible flap loss.  I performed an Carter's test on the left arm that shows it is suitable for transfer.  She understands these risks and is willing to proceed.  I spent 20 minutes with the patient today, 15 of which was counseling and coordination of care.

## 2018-09-17 NOTE — ANESTHESIA PREPROCEDURE EVALUATION
Anesthesia Evaluation     . Pt has had prior anesthetic. Type: General and MAC           ROS/MED HX    ENT/Pulmonary:     (+)tobacco use, Current use 0.5 PPD for 30 years, now down to 5 cigs a day packs/day  , . .   (-) sleep apnea   Neurologic:  - neg neurologic ROS    (-) seizures, CVA and TIA   Cardiovascular:     (+) hypertension----. : . . . :. . Previous cardiac testing date:results:date: results:ECG reviewed date:8/6/2018 results:NSR, non-specific ST abnoramity date: results:         (-) CAD, arrhythmias, irregular heartbeat/palpitations and valvular problems/murmurs   METS/Exercise Tolerance:  >4 METS   Hematologic:  - neg hematologic  ROS       Musculoskeletal:  - neg musculoskeletal ROS       GI/Hepatic:  - neg GI/hepatic ROS      (-) GERD and liver disease   Renal/Genitourinary:  - ROS Renal section negative    (-) renal disease   Endo:  - neg endo ROS       Psychiatric:  - neg psychiatric ROS       Infectious Disease:  - neg infectious disease ROS       Malignancy:   (+) Malignancy History of Other  Other CA palate Active status post         Other:    (+) No chance of pregnancy C-spine cleared: N/A, no H/O Chronic Pain,no other significant disability                    Physical Exam  Normal systems: cardiovascular, pulmonary and dental    Airway   Mallampati: II  TM distance: >3 FB  Neck ROM: full    Dental   (+) missing  Comment: Only 6 lower teeth; No teeth maxillary; missing mandibular incisors    Cardiovascular   Rhythm and rate: regular and normal  (-) no weak pulses and no murmur    Pulmonary    breath sounds clear to auscultation(-) no rhonchi    Other findings:   Results for LUCA HOLDER (MRN [id number]) as of 9/17/2018 15:29    9/17/2018 12:15  ABO: O  RH(D): Pos  Antibody Screen: Neg  Test Valid Only At: [facility]  Specimen Expires: 09/20/2018 9/17/2018 12:16  Sodium: 138  Potassium: 4.2  Chloride: 106  Carbon Dioxide: 24  Urea Nitrogen: 17  Creatinine: 0.83  GFR Estimate:  69  GFR Estimate If Black: 84  Calcium: 9.4  Anion Gap: 8  Glucose: 95  WBC: 5.3  Hemoglobin: 14.3  Hematocrit: 44.5  Platelet Count: 299  RBC Count: 4.49  MCV: 99  MCH: 31.8  MCHC: 32.1  RDW: 13.7  INR: 0.94           PAC Discussion and Assessment    ASA Classification: 3  Case is suitable for: [place]  Anesthetic techniques and relevant risks discussed: GA  Invasive monitoring and risk discussed: Yes  Types:   Possibility and Risk of blood transfusion discussed: Yes  NPO instructions given:   Additional anesthetic preparation and risks discussed:   Needs early admission to pre-op area:   Other:     PAC Resident/NP Anesthesia Assessment:  Martita Burden is a 61 yo female scheduled for Right Maxillectomy, Right Neck Exploration, Right Forearm Free Flap, Split Thickness Skin Graft, Nasogastric Feeding Tube on 9/20/2018 by Dr. Can in treatment of right palate mass     Previous anesthesia without complications    1) Cardiac: HTN. METS well over 4 without cardiac symptoms. Denies chest pain, PND, othopnea. No edema. EKG 8/6/2018 NSR with nonspecific ST abnormality   2) Pulmonary: Smoked 0.5 PPD for 30 years, now down to 5 cigarettes a day and using nicotine patch.  3) Onc: Right palate mass first discovered a year ago. She has had some issues with insurance coverage and finding a surgeon and it has taken some time to eventually have surgery.   4) Endo:  BMI 35              Reviewed and Signed by PAC Mid-Level Provider/Resident  Mid-Level Provider/Resident: PARVEZ Li  Date: 9/17/2018  Time: 1130    Attending Anesthesiologist Anesthesia Assessment:  62 year old for right maxillectomy, free flap, neck dissection and NG tube in management of right palate mass. Current everyday smoker, but down to ~ 5 a day at this point. No cardiac ro pulmonary symptoms.     Patient/case discussed with DESIRAE. No need to see patient. Patient is appropriate for the planned procedure without further work-up or medical  management.      Reviewed and Signed by PAC Anesthesiologist  Anesthesiologist: anu  Date: 9/17/2018  Time:   Pass/Fail: Pass  Disposition:     PAC Pharmacist Assessment:        Pharmacist:   Date:   Time:      Anesthesia Plan      History & Physical Review      ASA Status:  3 .    NPO Status:  > 8 hours    Plan for General and ETT with Intravenous induction. Maintenance will be Balanced.      Additional equipment: 2nd IV and Arterial Line GETA. PIVx2-3. Standard ASA monitors + arterial line. IV opioids, adjuncts. PACU vs ICU postop.    Risks and benefits of anesthetic discussed with patient including sore throat, voice hoarseness, injury to vocal cords, throat, mouth, teeth, tongue, and lips from intubation; nausea/vomiting; cardiac arrest, respiratory complications, MI, stroke, blood clots, death.    Transfusion risks discussed include infection, and complications involving the heart and lungs (transfusion reaction)    Arterial line risks discussed include bleeding/hematoma, blood clot, ischemia, loss of limb.    We also discussed potential prolonged intubation due and ICU stay due to surgical site close to airway and potential airway edema.    Presented opportunity to answer patient and family questions. Questions addressed.        Postoperative Care      Consents  Anesthetic plan, risks, benefits and alternatives discussed with:  Patient.  Use of blood products discussed: Yes.   Use of blood products discussed with Patient.  Consented to blood products.  .                          .

## 2018-09-17 NOTE — MR AVS SNAPSHOT
After Visit Summary   9/17/2018    Ms. Martita Burden    MRN: [id number]           Patient Information     Date Of Birth          1955        Visit Information        Provider Department      9/17/2018 10:20 AM Terrence Gutierrez MD [facility] Ear Nose and Throat        Today's Diagnoses     Malignant neoplasm of hard palate (H)    -  1      Care Instructions    -Continue with surgery as scheduled 9/20/18            Follow-ups after your visit        Your next 10 appointments already scheduled     Sep 25, 2018  9:00 AM CDT   Enteral Tube Feeding - [address] [address] with Michelle Jauregui, RN   [facility], [place], Patient Learning Center ([facility], [facility])    [address] [facility] [address]              Appointment is located at [address] [address]              Who to contact     Please call your clinic at [phone] to:    Ask questions about your health    Make or cancel appointments    Discuss your medicines    Learn about your test results    Speak to your doctor            Additional Information About Your Visit        MyChart Information     [facility] gives you secure access to your electronic health record. If you see a primary care provider, you can also send messages to your care team and make appointments. If you have questions, please call your primary care clinic.  If you do not have a primary care provider, please call [phone] and they will assist you.      [facility] is an electronic gateway that provides easy, online access to your medical records. With [facility], you can request a clinic appointment, read your test results, renew a prescription or communicate with your care team.     To access your existing account, please contact your [facility] or call [phone] for assistance.        Care EveryWhere ID     This is your Care EveryWhere ID. This could be used  "by other organizations to access your [place] medical records  [id number]        Your Vitals Were     Height BMI (Body Mass Index)                1.651 m (5' 5\") 35.11 kg/m2           Blood Pressure from Last 3 Encounters:   09/24/18 116/57   09/17/18 143/83    Weight from Last 3 Encounters:   09/24/18 100 kg (220 lb 6.4 oz)   09/17/18 95.9 kg (211 lb 8 oz)   09/17/18 95.7 kg (211 lb)              Today, you had the following     No orders found for display       Primary Care Provider    None Specified       [facility] [address] [facility] [address]        Equal Access to Services     [facility] : Hadii onesimo Newell, nabil antonio, jodie villamabola ding, karolina jiménez . So [facility] [phone].    ATENCIÓN: Si habla español, tiene a robison disposición servicios gratuitos de asistencia lingüística. Llame al [phone].    We comply with applicable federal civil rights laws and [place] laws. We do not discriminate on the basis of race, color, national origin, age, disability, sex, sexual orientation, or gender identity.            Thank you!     Thank you for choosing [facility] EAR NOSE AND THROAT  for your care. Our goal is always to provide you with excellent care. Hearing back from our patients is one way we can continue to improve our services. Please take a few minutes to complete the written survey that you may receive in the mail after your visit with us. Thank you!             Your Updated Medication List - Protect others around you: Learn how to safely use, store and throw away your medicines at www.disposemymeds.org.          This list is accurate as of 9/17/18 11:59 PM.  Always use your most recent med list.                   Brand Name Dispense Instructions for use Diagnosis    aspirin 325 MG EC tablet      Take 325 mg by mouth every morning ON HOLD FOR SURGERY SINCE 09/10/2018        Calcium-Magnesium-Zinc 333-133-5 MG Tabs per tablet      Take 1 tablet " by mouth 2 times daily (with meals)        [employer] FLAXSEED OIL 1000 MG Caps      Take 1,000 mg by mouth 2 times daily ON HOLD FOR SURGERY SINCE 09/10/2018        lisinopril-hydrochlorothiazide 10-12.5 MG per tablet    PRINZIDE/ZESTORETIC     Take 1 tablet by mouth every morning        nicotine 14 MG/24HR 24 hr patch    NICODERM CQ     Place 1 patch onto the skin every 24 hours        simvastatin 40 MG tablet    ZOCOR     Take 40 mg by mouth every evening        TYLENOL PO      Take 1,000 mg by mouth as needed for mild pain or fever

## 2018-09-17 NOTE — MR AVS SNAPSHOT
After Visit Summary   2018    Ms. Martita Burden    MRN: [id number]           Patient Information     Date Of Birth          1955        Visit Information        Provider Department      2018 11:00 AM Chaz Castorena APRN [facility] Pre[facility] Assessment [facility]        Today's Diagnoses     Preop general physical exam    -  1      Care Instructions    Preparing for Your Surgery      Name:  Martita Burden   MRN:  [id number]   :  1955   Today's Date:  2018     Arriving for surgery:  Surgery date:  18  Arrival time:  5:30 am    Please come to:  [facility] [address]  [address] [place]  [address]    -   parking is available in front of the hospital from 5:15 am to 8:00 pm    -  Stop at the Information Desk in the lobby    -   Inform the information person that you are here for surgery. An escort to 3C will be provided. If you would not like an escort, please proceed to 3C on the 3rd floor. [phone]     -  Bring your ID and insurance card.    What can I eat or drink?  -  You may have solid food or milk products until 8 hours prior to your surgery. (Until 11:30 pm 18 )  -  You may have water, apple juice, clear BLACK coffee (NO creamer or nondairy creamer), or 7up/Sprite until 2 hours prior to your surgery. (Until 5:30 am )    Which medicines can I take?       -  Do not bring your own medications to the hospital.        -  Follow Otolaryngology Clinic instructions regarding Ibuprofen. If no instructions given, NO Ibuprofen the day prior to surgery.         -  Aspirin and Flaxseed Oil currently on hold.    -  Do NOT take these medications in the morning, the day of surgery:  Lisinopril-Hydrochlorothiazide, Calcium    -  Please take these medications the morning of surgery:  Acetaminophen (Tylenol) if needed    How do I prepare myself?  -  Take two showers: one the night before surgery; and one the morning  of surgery.         Use Scrubcare or Hibiclens to wash from neck down.  You may use your own shampoo and conditioner. No other hair products.   -  Do NOT use lotion, powder, colognes, deodorant, or antiperspirant the day of your surgery.  -  Do NOT wear any makeup, fingernail polish or jewelry.    Questions or Concerns:  If you have questions or concerns prior to your surgery, call [phone]. (Mon - Fri   8 am- 5:30 pm)  Questions after surgery, contact your Surgeons office.      AFTER YOUR SURGERY  Breathing exercises   Breathing exercises help you recover faster. Take deep breaths and let the air out slowly. This will:     Help you wake up after surgery.    Help prevent complications like pneumonia.  Preventing complications will help you go home sooner.   We may give you a breathing device (incentive spirometer) to encourage you to breathe deeply.   Nausea and vomiting   You may feel sick to your stomach after surgery; if so, let your nurse know.    Pain control:  After surgery, you may have pain. Our goal is to help you manage your pain. Pain medicine will help you feel comfortable enough to do activities that will help you heal.  These activities may include breathing exercises, walking and physical therapy.   To help your health care team treat your pain we will ask: 1) If you have pain  2) where it is located 3) describe your pain in your words  Methods of pain control include medications given by mouth, vein or by nerve block for some surgeries.  Sequential Compression Device (SCD) or Pneumo Boots:  You may need to wear SCD S on your legs or feet. These are wraps connected to a machine that pumps in air and releases it. The repeated pumping helps prevent blood clots from forming.                     Follow-ups after your visit        Your next 10 appointments already scheduled     Sep 17, 2018  2:00 PM CDT   (Arrive by 1:30 PM)   CTA LOWER EXTREMITY BILATERAL WITH CONTRAST with UUCT1   [facility], [place], [place]  ([facility] [place])    [address] [facility] [id number] [phone]           How do I prepare for my exam? (Food and drink instructions) **You will have contrast for this exam.** Do not eat or drink for 2 hours before your exam. If you need to take medicine, you may take it with small sips of water. (We may ask you to take liquid medicine as well.)  The day before your exam, drink extra fluids at least six 8-ounce glasses (unless your doctor tells you to restrict your fluids).  How do I prepare for my exam? (Other instructions) Patients over 70 or patients with diabetes or kidney problems: If you haven t had a blood test (creatinine test) within the last 30 days, the Cardiologist/Radiologist may require you to get this test prior to your exam.  What should I wear: Please wear loose clothing, such as a sweat suit or jogging clothes.  Avoid snaps, zippers and other metal. We may ask you to undress and put on a hospital gown.  How long does the exam take: Most scans take less than 20 minutes.  What should I bring: Please bring any scans or X-rays taken at other hospitals, if similar tests were done. Also bring a list of your medicines, including vitamins, minerals and over-the-counter drugs. It is safest to leave personal items at home.  Do I need a :  No  is needed.  What do I need to tell my doctor? Be sure to tell your doctor: * If you have any allergies. * If there s any chance you are pregnant. * If you are breastfeeding. * If you have diabetes as your medication may need to be adjusted for this exam.  What should I do after the exam: No restrictions, You may resume normal activities.  What is this test: A CT (computed tomography) scan is a series of pictures that allows us to look inside your body. The scanner creates images of the body in cross sections, much like slices of bread. This helps us see any problems more clearly. You may receive  contrast (X-ray dye) before or during your scan. Contrast is given through an IV (small needle in your arm).  Who should I call with questions: If you have any questions, please call the Imaging Department where you will have your exam. Directions, parking instructions, and other information is available on our website, [email or web address].            Sep 20, 2018   Procedure with Leslie Garza MD   [facility], [place], Same Day Surgery (--)    [address]  [place] [address] [phone]              Future tests that were ordered for you today     Open Future Orders        Priority Expected Expires Ordered    ABO/Rh type and screen Routine 9/17/2018 10/17/2018 9/17/2018    Basic metabolic panel Routine 9/17/2018 10/17/2018 9/17/2018    CBC with platelets Routine 9/17/2018 10/17/2018 9/17/2018    INR Routine 9/17/2018 10/17/2018 9/17/2018            Who to contact     Please call your clinic at [phone] to:    Ask questions about your health    Make or cancel appointments    Discuss your medicines    Learn about your test results    Speak to your doctor            Additional Information About Your Visit        [employer] Information     [employer] gives you secure access to your electronic health record. If you see a primary care provider, you can also send messages to your care team and make appointments. If you have questions, please call your primary care clinic.  If you do not have a primary care provider, please call [phone] and they will assist you.      [employer] is an electronic gateway that provides easy, online access to your medical records. With [employer], you can request a clinic appointment, read your test results, renew a prescription or communicate with your care team.     To access your existing account, please contact your [facility] or call [phone] for assistance.        Care EveryWhere ID     This is your Care EveryWhere ID. This could be used by other  "organizations to access your [place] medical records  [id number]        Your Vitals Were     Pulse Temperature Respirations Height Pulse Oximetry BMI (Body Mass Index)    78 98  F (36.7  C) (Oral) 16 1.651 m (5' 5\") 96% 35.2 kg/m2       Blood Pressure from Last 3 Encounters:   09/17/18 143/83    Weight from Last 3 Encounters:   09/17/18 95.9 kg (211 lb 8 oz)   09/17/18 95.7 kg (211 lb)   09/05/18 95.3 kg (210 lb)               Primary Care Provider    None Specified       No primary provider on file.        Equal Access to Services     [facility]: Hadrebecca Newell, nabil antonio, jodie ding, karolina jiménez . So [facility] [phone].    ATENCIÓN: Si habla español, tiene a robison disposición servicios gratuitos de asistencia lingüística. LlMer[facility] Hospital [phone].    We comply with applicable federal civil rights laws and [place] laws. We do not discriminate on the basis of race, color, national origin, age, disability, sex, sexual orientation, or gender identity.            Thank you!     Thank you for choosing [facility] PREOPERATIVE ASSESSMENT CENTER  for your care. Our goal is always to provide you with excellent care. Hearing back from our patients is one way we can continue to improve our services. Please take a few minutes to complete the written survey that you may receive in the mail after your visit with us. Thank you!             Your Updated Medication List - Protect others around you: Learn how to safely use, store and throw away your medicines at www.disposemymeds.org.          This list is accurate as of 9/17/18 11:47 AM.  Always use your most recent med list.                   Brand Name Dispense Instructions for use Diagnosis    aspirin 325 MG EC tablet      Take 325 mg by mouth every morning ON HOLD FOR SURGERY SINCE 09/10/2018        Calcium-Magnesium-Zinc 333-133-5 MG Tabs per tablet      Take 1 tablet by mouth 2 times daily (with meals)        CVS " FLAXSEED OIL 1000 MG Caps      Take 1,000 mg by mouth 2 times daily ON HOLD FOR SURGERY SINCE 09/10/2018        lisinopril-hydrochlorothiazide 10-12.5 MG per tablet    PRINZIDE/ZESTORETIC     Take 1 tablet by mouth every morning        nicotine 14 MG/24HR 24 hr patch    NICODERM CQ     Place 1 patch onto the skin every 24 hours        simvastatin 40 MG tablet    ZOCOR     Take 40 mg by mouth every evening        TYLENOL PO      Take 1,000 mg by mouth as needed for mild pain or fever

## 2018-09-20 ENCOUNTER — ANESTHESIA (OUTPATIENT)
Dept: SURGERY | Facility: CLINIC | Age: 63
End: 2018-09-20
Payer: COMMERCIAL

## 2018-09-20 ENCOUNTER — HOSPITAL ENCOUNTER (INPATIENT)
Facility: CLINIC | Age: 63
LOS: 6 days | Discharge: HOME HEALTH | End: 2018-09-26
Attending: OTOLARYNGOLOGY | Admitting: OTOLARYNGOLOGY
Payer: COMMERCIAL

## 2018-09-20 ENCOUNTER — APPOINTMENT (OUTPATIENT)
Dept: GENERAL RADIOLOGY | Facility: CLINIC | Age: 63
End: 2018-09-20
Attending: OTOLARYNGOLOGY
Payer: COMMERCIAL

## 2018-09-20 DIAGNOSIS — C06.9 SQUAMOUS CELL CARCINOMA OF ORAL CAVITY (H): Primary | ICD-10-CM

## 2018-09-20 DIAGNOSIS — I48.0 PAROXYSMAL ATRIAL FIBRILLATION (H): ICD-10-CM

## 2018-09-20 DIAGNOSIS — C80.1 CLEAR CELL CARCINOMA (H): ICD-10-CM

## 2018-09-20 DIAGNOSIS — K59.03 DRUG-INDUCED CONSTIPATION: ICD-10-CM

## 2018-09-20 DIAGNOSIS — G89.18 ACUTE POST-OPERATIVE PAIN: ICD-10-CM

## 2018-09-20 DIAGNOSIS — E63.9 NUTRITIONAL DEFICIENCY: ICD-10-CM

## 2018-09-20 PROBLEM — E66.812 CLASS 2 OBESITY IN ADULT: Status: ACTIVE | Noted: 2018-09-20

## 2018-09-20 LAB
ABO + RH BLD: NORMAL
ABO + RH BLD: NORMAL
ANION GAP SERPL CALCULATED.3IONS-SCNC: 9 MMOL/L (ref 3–14)
BASE DEFICIT BLDA-SCNC: 0.2 MMOL/L
BASE DEFICIT BLDA-SCNC: 1.9 MMOL/L
BASE DEFICIT BLDA-SCNC: 1.9 MMOL/L
BASE DEFICIT BLDA-SCNC: 4.2 MMOL/L
BLD GP AB SCN SERPL QL: NORMAL
BLD PROD TYP BPU: NORMAL
BLD UNIT ID BPU: 0
BLD UNIT ID BPU: 0
BLOOD BANK CMNT PATIENT-IMP: NORMAL
BLOOD BANK CMNT PATIENT-IMP: NORMAL
BLOOD PRODUCT CODE: NORMAL
BLOOD PRODUCT CODE: NORMAL
BPU ID: NORMAL
BPU ID: NORMAL
BUN SERPL-MCNC: 15 MG/DL (ref 7–30)
CA-I BLD-MCNC: 4.9 MG/DL (ref 4.4–5.2)
CA-I BLD-MCNC: 5.2 MG/DL (ref 4.4–5.2)
CA-I BLD-MCNC: 5.5 MG/DL (ref 4.4–5.2)
CALCIUM SERPL-MCNC: 8.1 MG/DL (ref 8.5–10.1)
CHLORIDE SERPL-SCNC: 107 MMOL/L (ref 94–109)
CO2 SERPL-SCNC: 23 MMOL/L (ref 20–32)
CREAT SERPL-MCNC: 0.65 MG/DL (ref 0.52–1.04)
ERYTHROCYTE [DISTWIDTH] IN BLOOD BY AUTOMATED COUNT: 13.7 % (ref 10–15)
GFR SERPL CREATININE-BSD FRML MDRD: >90 ML/MIN/1.7M2
GLUCOSE BLD-MCNC: 122 MG/DL (ref 70–99)
GLUCOSE BLD-MCNC: 127 MG/DL (ref 70–99)
GLUCOSE BLD-MCNC: 148 MG/DL (ref 70–99)
GLUCOSE BLDC GLUCOMTR-MCNC: 102 MG/DL (ref 70–99)
GLUCOSE SERPL-MCNC: 182 MG/DL (ref 70–99)
HCO3 BLD-SCNC: 22 MMOL/L (ref 21–28)
HCO3 BLD-SCNC: 23 MMOL/L (ref 21–28)
HCO3 BLD-SCNC: 24 MMOL/L (ref 21–28)
HCO3 BLD-SCNC: 25 MMOL/L (ref 21–28)
HCT VFR BLD AUTO: 29.8 % (ref 35–47)
HGB BLD-MCNC: 10.2 G/DL (ref 11.7–15.7)
HGB BLD-MCNC: 10.9 G/DL (ref 11.7–15.7)
HGB BLD-MCNC: 9.7 G/DL (ref 11.7–15.7)
HGB BLD-MCNC: 9.9 G/DL (ref 11.7–15.7)
INR PPP: 1.11 (ref 0.86–1.14)
LACTATE BLD-SCNC: 0.5 MMOL/L (ref 0.7–2)
LACTATE BLD-SCNC: 2 MMOL/L (ref 0.7–2)
MAGNESIUM SERPL-MCNC: 1.6 MG/DL (ref 1.6–2.3)
MCH RBC QN AUTO: 32.5 PG (ref 26.5–33)
MCHC RBC AUTO-ENTMCNC: 33.2 G/DL (ref 31.5–36.5)
MCV RBC AUTO: 98 FL (ref 78–100)
MRSA DNA SPEC QL NAA+PROBE: NEGATIVE
NUM BPU REQUESTED: 2
O2/TOTAL GAS SETTING VFR VENT: 30 %
O2/TOTAL GAS SETTING VFR VENT: 90 %
PCO2 BLD: 41 MM HG (ref 35–45)
PCO2 BLD: 42 MM HG (ref 35–45)
PCO2 BLD: 42 MM HG (ref 35–45)
PCO2 BLD: 43 MM HG (ref 35–45)
PH BLD: 7.32 PH (ref 7.35–7.45)
PH BLD: 7.36 PH (ref 7.35–7.45)
PH BLD: 7.37 PH (ref 7.35–7.45)
PH BLD: 7.38 PH (ref 7.35–7.45)
PHOSPHATE SERPL-MCNC: 3.4 MG/DL (ref 2.5–4.5)
PLATELET # BLD AUTO: 245 10E9/L (ref 150–450)
PO2 BLD: 159 MM HG (ref 80–105)
PO2 BLD: 72 MM HG (ref 80–105)
POTASSIUM BLD-SCNC: 3.1 MMOL/L (ref 3.4–5.3)
POTASSIUM BLD-SCNC: 3.8 MMOL/L (ref 3.4–5.3)
POTASSIUM BLD-SCNC: 4.1 MMOL/L (ref 3.4–5.3)
POTASSIUM SERPL-SCNC: 3.6 MMOL/L (ref 3.4–5.3)
RBC # BLD AUTO: 3.05 10E12/L (ref 3.8–5.2)
SODIUM BLD-SCNC: 138 MMOL/L (ref 133–144)
SODIUM BLD-SCNC: 139 MMOL/L (ref 133–144)
SODIUM BLD-SCNC: 139 MMOL/L (ref 133–144)
SODIUM SERPL-SCNC: 139 MMOL/L (ref 133–144)
SPECIMEN EXP DATE BLD: NORMAL
SPECIMEN SOURCE: NORMAL
TRANSFUSION STATUS PATIENT QL: NORMAL
WBC # BLD AUTO: 15.5 10E9/L (ref 4–11)

## 2018-09-20 PROCEDURE — 07B10ZZ EXCISION OF RIGHT NECK LYMPHATIC, OPEN APPROACH: ICD-10-PCS | Performed by: OTOLARYNGOLOGY

## 2018-09-20 PROCEDURE — 0JBH0ZZ EXCISION OF LEFT LOWER ARM SUBCUTANEOUS TISSUE AND FASCIA, OPEN APPROACH: ICD-10-PCS | Performed by: OTOLARYNGOLOGY

## 2018-09-20 PROCEDURE — 82330 ASSAY OF CALCIUM: CPT | Performed by: ANESTHESIOLOGY

## 2018-09-20 PROCEDURE — 0JR407Z REPLACEMENT OF RIGHT NECK SUBCUTANEOUS TISSUE AND FASCIA WITH AUTOLOGOUS TISSUE SUBSTITUTE, OPEN APPROACH: ICD-10-PCS | Performed by: OTOLARYNGOLOGY

## 2018-09-20 PROCEDURE — 88313 SPECIAL STAINS GROUP 2: CPT | Performed by: OTOLARYNGOLOGY

## 2018-09-20 PROCEDURE — 37000008 ZZH ANESTHESIA TECHNICAL FEE, 1ST 30 MIN: Performed by: OTOLARYNGOLOGY

## 2018-09-20 PROCEDURE — 25000132 ZZH RX MED GY IP 250 OP 250 PS 637: Performed by: OTOLARYNGOLOGY

## 2018-09-20 PROCEDURE — 20000004 ZZH R&B ICU UMMC

## 2018-09-20 PROCEDURE — 25000125 ZZHC RX 250: Performed by: NURSE ANESTHETIST, CERTIFIED REGISTERED

## 2018-09-20 PROCEDURE — 25000128 H RX IP 250 OP 636: Performed by: STUDENT IN AN ORGANIZED HEALTH CARE EDUCATION/TRAINING PROGRAM

## 2018-09-20 PROCEDURE — 00000146 ZZHCL STATISTIC GLUCOSE BY METER IP

## 2018-09-20 PROCEDURE — 88309 TISSUE EXAM BY PATHOLOGIST: CPT | Performed by: OTOLARYNGOLOGY

## 2018-09-20 PROCEDURE — 25000128 H RX IP 250 OP 636: Performed by: OTOLARYNGOLOGY

## 2018-09-20 PROCEDURE — 80048 BASIC METABOLIC PNL TOTAL CA: CPT | Performed by: ANESTHESIOLOGY

## 2018-09-20 PROCEDURE — 25000128 H RX IP 250 OP 636: Performed by: NURSE ANESTHETIST, CERTIFIED REGISTERED

## 2018-09-20 PROCEDURE — 88331 PATH CONSLTJ SURG 1 BLK 1SPC: CPT | Performed by: OTOLARYNGOLOGY

## 2018-09-20 PROCEDURE — 00000159 ZZHCL STATISTIC H-SEND OUTS PREP: Performed by: OTOLARYNGOLOGY

## 2018-09-20 PROCEDURE — 25000125 ZZHC RX 250: Performed by: OTOLARYNGOLOGY

## 2018-09-20 PROCEDURE — 88341 IMHCHEM/IMCYTCHM EA ADD ANTB: CPT | Performed by: OTOLARYNGOLOGY

## 2018-09-20 PROCEDURE — 40000275 ZZH STATISTIC RCP TIME EA 10 MIN

## 2018-09-20 PROCEDURE — 40000986 XR CHEST PORT 1 VW

## 2018-09-20 PROCEDURE — 99291 CRITICAL CARE FIRST HOUR: CPT | Mod: GC | Performed by: ANESTHESIOLOGY

## 2018-09-20 PROCEDURE — 25000132 ZZH RX MED GY IP 250 OP 250 PS 637: Performed by: STUDENT IN AN ORGANIZED HEALTH CARE EDUCATION/TRAINING PROGRAM

## 2018-09-20 PROCEDURE — 82803 BLOOD GASES ANY COMBINATION: CPT | Performed by: ANESTHESIOLOGY

## 2018-09-20 PROCEDURE — 25000565 ZZH ISOFLURANE, EA 15 MIN: Performed by: OTOLARYNGOLOGY

## 2018-09-20 PROCEDURE — 0CR307Z REPLACEMENT OF SOFT PALATE WITH AUTOLOGOUS TISSUE SUBSTITUTE, OPEN APPROACH: ICD-10-PCS | Performed by: OTOLARYNGOLOGY

## 2018-09-20 PROCEDURE — 87640 STAPH A DNA AMP PROBE: CPT | Performed by: STUDENT IN AN ORGANIZED HEALTH CARE EDUCATION/TRAINING PROGRAM

## 2018-09-20 PROCEDURE — 09BQ0ZZ EXCISION OF RIGHT MAXILLARY SINUS, OPEN APPROACH: ICD-10-PCS | Performed by: OTOLARYNGOLOGY

## 2018-09-20 PROCEDURE — 88342 IMHCHEM/IMCYTCHM 1ST ANTB: CPT | Performed by: OTOLARYNGOLOGY

## 2018-09-20 PROCEDURE — 84132 ASSAY OF SERUM POTASSIUM: CPT | Performed by: ANESTHESIOLOGY

## 2018-09-20 PROCEDURE — 84100 ASSAY OF PHOSPHORUS: CPT | Performed by: ANESTHESIOLOGY

## 2018-09-20 PROCEDURE — 36000068 ZZH SURGERY LEVEL 5 1ST 30 MIN - UMMC: Performed by: OTOLARYNGOLOGY

## 2018-09-20 PROCEDURE — 87641 MR-STAPH DNA AMP PROBE: CPT | Performed by: STUDENT IN AN ORGANIZED HEALTH CARE EDUCATION/TRAINING PROGRAM

## 2018-09-20 PROCEDURE — P9016 RBC LEUKOCYTES REDUCED: HCPCS | Performed by: NURSE PRACTITIONER

## 2018-09-20 PROCEDURE — 36000070 ZZH SURGERY LEVEL 5 EA 15 ADDTL MIN - UMMC: Performed by: OTOLARYNGOLOGY

## 2018-09-20 PROCEDURE — 85610 PROTHROMBIN TIME: CPT | Performed by: ANESTHESIOLOGY

## 2018-09-20 PROCEDURE — 88275 CYTOGENETICS 100-300: CPT

## 2018-09-20 PROCEDURE — 85027 COMPLETE CBC AUTOMATED: CPT | Performed by: ANESTHESIOLOGY

## 2018-09-20 PROCEDURE — 88271 CYTOGENETICS DNA PROBE: CPT

## 2018-09-20 PROCEDURE — 0DH67UZ INSERTION OF FEEDING DEVICE INTO STOMACH, VIA NATURAL OR ARTIFICIAL OPENING: ICD-10-PCS | Performed by: OTOLARYNGOLOGY

## 2018-09-20 PROCEDURE — 88305 TISSUE EXAM BY PATHOLOGIST: CPT | Performed by: OTOLARYNGOLOGY

## 2018-09-20 PROCEDURE — 25000128 H RX IP 250 OP 636

## 2018-09-20 PROCEDURE — 25000131 ZZH RX MED GY IP 250 OP 636 PS 637: Performed by: OTOLARYNGOLOGY

## 2018-09-20 PROCEDURE — 25000132 ZZH RX MED GY IP 250 OP 250 PS 637: Performed by: ANESTHESIOLOGY

## 2018-09-20 PROCEDURE — 27810169 ZZH OR IMPLANT GENERAL: Performed by: OTOLARYNGOLOGY

## 2018-09-20 PROCEDURE — 84295 ASSAY OF SERUM SODIUM: CPT | Performed by: ANESTHESIOLOGY

## 2018-09-20 PROCEDURE — 83605 ASSAY OF LACTIC ACID: CPT | Performed by: ANESTHESIOLOGY

## 2018-09-20 PROCEDURE — 0CB20ZZ EXCISION OF HARD PALATE, OPEN APPROACH: ICD-10-PCS | Performed by: OTOLARYNGOLOGY

## 2018-09-20 PROCEDURE — 94002 VENT MGMT INPAT INIT DAY: CPT

## 2018-09-20 PROCEDURE — 40000014 ZZH STATISTIC ARTERIAL MONITORING DAILY

## 2018-09-20 PROCEDURE — 25000125 ZZHC RX 250: Performed by: STUDENT IN AN ORGANIZED HEALTH CARE EDUCATION/TRAINING PROGRAM

## 2018-09-20 PROCEDURE — 83735 ASSAY OF MAGNESIUM: CPT | Performed by: ANESTHESIOLOGY

## 2018-09-20 PROCEDURE — 09BM0ZZ EXCISION OF NASAL SEPTUM, OPEN APPROACH: ICD-10-PCS | Performed by: OTOLARYNGOLOGY

## 2018-09-20 PROCEDURE — P9041 ALBUMIN (HUMAN),5%, 50ML: HCPCS | Performed by: NURSE ANESTHETIST, CERTIFIED REGISTERED

## 2018-09-20 PROCEDURE — 37000009 ZZH ANESTHESIA TECHNICAL FEE, EACH ADDTL 15 MIN: Performed by: OTOLARYNGOLOGY

## 2018-09-20 PROCEDURE — E2402 NEG PRESS WOUND THERAPY PUMP: HCPCS

## 2018-09-20 PROCEDURE — 88311 DECALCIFY TISSUE: CPT | Performed by: OTOLARYNGOLOGY

## 2018-09-20 PROCEDURE — 40000170 ZZH STATISTIC PRE-PROCEDURE ASSESSMENT II: Performed by: OTOLARYNGOLOGY

## 2018-09-20 PROCEDURE — 82947 ASSAY GLUCOSE BLOOD QUANT: CPT | Performed by: ANESTHESIOLOGY

## 2018-09-20 PROCEDURE — 80048 BASIC METABOLIC PNL TOTAL CA: CPT | Performed by: STUDENT IN AN ORGANIZED HEALTH CARE EDUCATION/TRAINING PROGRAM

## 2018-09-20 PROCEDURE — 0CR207Z REPLACEMENT OF HARD PALATE WITH AUTOLOGOUS TISSUE SUBSTITUTE, OPEN APPROACH: ICD-10-PCS | Performed by: OTOLARYNGOLOGY

## 2018-09-20 PROCEDURE — 27210794 ZZH OR GENERAL SUPPLY STERILE: Performed by: OTOLARYNGOLOGY

## 2018-09-20 PROCEDURE — 0HBJXZZ EXCISION OF LEFT UPPER LEG SKIN, EXTERNAL APPROACH: ICD-10-PCS | Performed by: OTOLARYNGOLOGY

## 2018-09-20 PROCEDURE — P9041 ALBUMIN (HUMAN),5%, 50ML: HCPCS

## 2018-09-20 DEVICE — IMP PROBE DOPPLER FLOW STD CUFF DP-SDP001: Type: IMPLANTABLE DEVICE | Site: NECK | Status: FUNCTIONAL

## 2018-09-20 DEVICE — IMP DEVICE ANASTOMOTIC 3.5MM COUPLER PURPLE GEM2755: Type: IMPLANTABLE DEVICE | Site: NECK | Status: FUNCTIONAL

## 2018-09-20 RX ORDER — POTASSIUM CHLORIDE 750 MG/1
20-40 TABLET, EXTENDED RELEASE ORAL
Status: DISCONTINUED | OUTPATIENT
Start: 2018-09-20 | End: 2018-09-26 | Stop reason: HOSPADM

## 2018-09-20 RX ORDER — NEOSTIGMINE METHYLSULFATE 1 MG/ML
VIAL (ML) INJECTION PRN
Status: DISCONTINUED | OUTPATIENT
Start: 2018-09-20 | End: 2018-09-20

## 2018-09-20 RX ORDER — NICOTINE 21 MG/24HR
1 PATCH, TRANSDERMAL 24 HOURS TRANSDERMAL EVERY 24 HOURS
Status: DISCONTINUED | OUTPATIENT
Start: 2018-09-21 | End: 2018-09-20 | Stop reason: CLARIF

## 2018-09-20 RX ORDER — HYDROMORPHONE HYDROCHLORIDE 1 MG/ML
.3-.5 INJECTION, SOLUTION INTRAMUSCULAR; INTRAVENOUS; SUBCUTANEOUS EVERY 10 MIN PRN
Status: CANCELLED | OUTPATIENT
Start: 2018-09-20

## 2018-09-20 RX ORDER — PROPOFOL 10 MG/ML
INJECTION, EMULSION INTRAVENOUS PRN
Status: DISCONTINUED | OUTPATIENT
Start: 2018-09-20 | End: 2018-09-20

## 2018-09-20 RX ORDER — AMOXICILLIN 250 MG
2 CAPSULE ORAL 2 TIMES DAILY
Status: DISCONTINUED | OUTPATIENT
Start: 2018-09-20 | End: 2018-09-26 | Stop reason: HOSPADM

## 2018-09-20 RX ORDER — MAGNESIUM SULFATE HEPTAHYDRATE 40 MG/ML
4 INJECTION, SOLUTION INTRAVENOUS EVERY 4 HOURS PRN
Status: DISCONTINUED | OUTPATIENT
Start: 2018-09-20 | End: 2018-09-26 | Stop reason: HOSPADM

## 2018-09-20 RX ORDER — LIDOCAINE HYDROCHLORIDE 20 MG/ML
INJECTION, SOLUTION INFILTRATION; PERINEURAL PRN
Status: DISCONTINUED | OUTPATIENT
Start: 2018-09-20 | End: 2018-09-20

## 2018-09-20 RX ORDER — LISINOPRIL/HYDROCHLOROTHIAZIDE 10-12.5 MG
1 TABLET ORAL EVERY MORNING
Status: DISCONTINUED | OUTPATIENT
Start: 2018-09-21 | End: 2018-09-26 | Stop reason: HOSPADM

## 2018-09-20 RX ORDER — GLYCOPYRROLATE 0.2 MG/ML
INJECTION, SOLUTION INTRAMUSCULAR; INTRAVENOUS PRN
Status: DISCONTINUED | OUTPATIENT
Start: 2018-09-20 | End: 2018-09-20

## 2018-09-20 RX ORDER — PROPOFOL 10 MG/ML
INJECTION, EMULSION INTRAVENOUS CONTINUOUS PRN
Status: DISCONTINUED | OUTPATIENT
Start: 2018-09-20 | End: 2018-09-20

## 2018-09-20 RX ORDER — NALOXONE HYDROCHLORIDE 0.4 MG/ML
.1-.4 INJECTION, SOLUTION INTRAMUSCULAR; INTRAVENOUS; SUBCUTANEOUS
Status: DISCONTINUED | OUTPATIENT
Start: 2018-09-20 | End: 2018-09-20 | Stop reason: HOSPADM

## 2018-09-20 RX ORDER — ALBUMIN, HUMAN INJ 5% 5 %
SOLUTION INTRAVENOUS CONTINUOUS PRN
Status: DISCONTINUED | OUTPATIENT
Start: 2018-09-20 | End: 2018-09-20

## 2018-09-20 RX ORDER — NALOXONE HYDROCHLORIDE 0.4 MG/ML
.1-.4 INJECTION, SOLUTION INTRAMUSCULAR; INTRAVENOUS; SUBCUTANEOUS
Status: DISCONTINUED | OUTPATIENT
Start: 2018-09-20 | End: 2018-09-20

## 2018-09-20 RX ORDER — FENTANYL CITRATE 50 UG/ML
25-50 INJECTION, SOLUTION INTRAMUSCULAR; INTRAVENOUS EVERY 5 MIN PRN
Status: CANCELLED | OUTPATIENT
Start: 2018-09-20

## 2018-09-20 RX ORDER — POTASSIUM CHLORIDE 29.8 MG/ML
20 INJECTION INTRAVENOUS
Status: DISCONTINUED | OUTPATIENT
Start: 2018-09-20 | End: 2018-09-26 | Stop reason: HOSPADM

## 2018-09-20 RX ORDER — AMOXICILLIN 250 MG
1 CAPSULE ORAL 2 TIMES DAILY
Status: DISCONTINUED | OUTPATIENT
Start: 2018-09-20 | End: 2018-09-22

## 2018-09-20 RX ORDER — GABAPENTIN 300 MG/1
300 CAPSULE ORAL ONCE
Status: COMPLETED | OUTPATIENT
Start: 2018-09-20 | End: 2018-09-20

## 2018-09-20 RX ORDER — ACETAMINOPHEN 325 MG/1
975 TABLET ORAL ONCE
Status: COMPLETED | OUTPATIENT
Start: 2018-09-20 | End: 2018-09-20

## 2018-09-20 RX ORDER — ONDANSETRON 4 MG/1
4 TABLET, ORALLY DISINTEGRATING ORAL EVERY 6 HOURS PRN
Status: DISCONTINUED | OUTPATIENT
Start: 2018-09-20 | End: 2018-09-26 | Stop reason: HOSPADM

## 2018-09-20 RX ORDER — EPHEDRINE SULFATE 50 MG/ML
INJECTION, SOLUTION INTRAMUSCULAR; INTRAVENOUS; SUBCUTANEOUS PRN
Status: DISCONTINUED | OUTPATIENT
Start: 2018-09-20 | End: 2018-09-20

## 2018-09-20 RX ORDER — CHLORHEXIDINE GLUCONATE ORAL RINSE 1.2 MG/ML
15 SOLUTION DENTAL EVERY 8 HOURS
Status: DISCONTINUED | OUTPATIENT
Start: 2018-09-21 | End: 2018-09-26 | Stop reason: HOSPADM

## 2018-09-20 RX ORDER — HEPARIN SODIUM 5000 [USP'U]/.5ML
5000 INJECTION, SOLUTION INTRAVENOUS; SUBCUTANEOUS EVERY 8 HOURS
Status: DISCONTINUED | OUTPATIENT
Start: 2018-09-21 | End: 2018-09-26 | Stop reason: HOSPADM

## 2018-09-20 RX ORDER — PROPOFOL 10 MG/ML
5-75 INJECTION, EMULSION INTRAVENOUS CONTINUOUS
Status: DISCONTINUED | OUTPATIENT
Start: 2018-09-20 | End: 2018-09-21

## 2018-09-20 RX ORDER — POTASSIUM CL/LIDO/0.9 % NACL 10MEQ/0.1L
10 INTRAVENOUS SOLUTION, PIGGYBACK (ML) INTRAVENOUS
Status: DISCONTINUED | OUTPATIENT
Start: 2018-09-20 | End: 2018-09-26 | Stop reason: HOSPADM

## 2018-09-20 RX ORDER — ASPIRIN 300 MG/1
300 SUPPOSITORY RECTAL ONCE
Status: COMPLETED | OUTPATIENT
Start: 2018-09-20 | End: 2018-09-20

## 2018-09-20 RX ORDER — DEXMEDETOMIDINE HYDROCHLORIDE 4 UG/ML
0.2-1.2 INJECTION, SOLUTION INTRAVENOUS CONTINUOUS
Status: DISCONTINUED | OUTPATIENT
Start: 2018-09-20 | End: 2018-09-20 | Stop reason: HOSPADM

## 2018-09-20 RX ORDER — DEXAMETHASONE SODIUM PHOSPHATE 4 MG/ML
INJECTION, SOLUTION INTRA-ARTICULAR; INTRALESIONAL; INTRAMUSCULAR; INTRAVENOUS; SOFT TISSUE PRN
Status: DISCONTINUED | OUTPATIENT
Start: 2018-09-20 | End: 2018-09-20

## 2018-09-20 RX ORDER — ONDANSETRON 2 MG/ML
4 INJECTION INTRAMUSCULAR; INTRAVENOUS EVERY 6 HOURS PRN
Status: DISCONTINUED | OUTPATIENT
Start: 2018-09-20 | End: 2018-09-26 | Stop reason: HOSPADM

## 2018-09-20 RX ORDER — ASPIRIN 325 MG
325 TABLET ORAL DAILY
Status: DISCONTINUED | OUTPATIENT
Start: 2018-09-21 | End: 2018-09-26 | Stop reason: HOSPADM

## 2018-09-20 RX ORDER — CIPROFLOXACIN 2 MG/ML
400 INJECTION, SOLUTION INTRAVENOUS EVERY 12 HOURS
Status: COMPLETED | OUTPATIENT
Start: 2018-09-21 | End: 2018-09-22

## 2018-09-20 RX ORDER — ALBUMIN, HUMAN INJ 5% 5 %
12.5 SOLUTION INTRAVENOUS ONCE
Status: COMPLETED | OUTPATIENT
Start: 2018-09-20 | End: 2018-09-20

## 2018-09-20 RX ORDER — PAPAVERINE HYDROCHLORIDE 30 MG/ML
INJECTION INTRAMUSCULAR; INTRAVENOUS PRN
Status: DISCONTINUED | OUTPATIENT
Start: 2018-09-20 | End: 2018-09-20 | Stop reason: HOSPADM

## 2018-09-20 RX ORDER — SODIUM CHLORIDE, SODIUM LACTATE, POTASSIUM CHLORIDE, CALCIUM CHLORIDE 600; 310; 30; 20 MG/100ML; MG/100ML; MG/100ML; MG/100ML
INJECTION, SOLUTION INTRAVENOUS CONTINUOUS PRN
Status: DISCONTINUED | OUTPATIENT
Start: 2018-09-20 | End: 2018-09-20

## 2018-09-20 RX ORDER — ONDANSETRON 4 MG/1
4 TABLET, ORALLY DISINTEGRATING ORAL EVERY 30 MIN PRN
Status: CANCELLED | OUTPATIENT
Start: 2018-09-20

## 2018-09-20 RX ORDER — EPINEPHRINE NASAL SOLUTION 1 MG/ML
SOLUTION NASAL PRN
Status: DISCONTINUED | OUTPATIENT
Start: 2018-09-20 | End: 2018-09-20 | Stop reason: HOSPADM

## 2018-09-20 RX ORDER — LIDOCAINE 40 MG/G
CREAM TOPICAL
Status: DISCONTINUED | OUTPATIENT
Start: 2018-09-20 | End: 2018-09-26 | Stop reason: HOSPADM

## 2018-09-20 RX ORDER — POTASSIUM CHLORIDE 7.45 MG/ML
10 INJECTION INTRAVENOUS
Status: DISCONTINUED | OUTPATIENT
Start: 2018-09-20 | End: 2018-09-26 | Stop reason: HOSPADM

## 2018-09-20 RX ORDER — POTASSIUM CHLORIDE 1.5 G/1.58G
20-40 POWDER, FOR SOLUTION ORAL
Status: DISCONTINUED | OUTPATIENT
Start: 2018-09-20 | End: 2018-09-26 | Stop reason: HOSPADM

## 2018-09-20 RX ORDER — SODIUM CHLORIDE, SODIUM LACTATE, POTASSIUM CHLORIDE, CALCIUM CHLORIDE 600; 310; 30; 20 MG/100ML; MG/100ML; MG/100ML; MG/100ML
INJECTION, SOLUTION INTRAVENOUS CONTINUOUS
Status: DISCONTINUED | OUTPATIENT
Start: 2018-09-20 | End: 2018-09-23

## 2018-09-20 RX ORDER — MEPERIDINE HYDROCHLORIDE 25 MG/ML
12.5 INJECTION INTRAMUSCULAR; INTRAVENOUS; SUBCUTANEOUS EVERY 5 MIN PRN
Status: CANCELLED | OUTPATIENT
Start: 2018-09-20

## 2018-09-20 RX ORDER — NALOXONE HYDROCHLORIDE 0.4 MG/ML
.1-.4 INJECTION, SOLUTION INTRAMUSCULAR; INTRAVENOUS; SUBCUTANEOUS
Status: DISCONTINUED | OUTPATIENT
Start: 2018-09-20 | End: 2018-09-21

## 2018-09-20 RX ORDER — CALCIUM CHLORIDE 100 MG/ML
INJECTION INTRAVENOUS; INTRAVENTRICULAR PRN
Status: DISCONTINUED | OUTPATIENT
Start: 2018-09-20 | End: 2018-09-20

## 2018-09-20 RX ORDER — MINERAL OIL/HYDROPHIL PETROLAT
OINTMENT (GRAM) TOPICAL EVERY 8 HOURS
Status: DISCONTINUED | OUTPATIENT
Start: 2018-09-21 | End: 2018-09-26 | Stop reason: HOSPADM

## 2018-09-20 RX ORDER — FENTANYL CITRATE 50 UG/ML
INJECTION, SOLUTION INTRAMUSCULAR; INTRAVENOUS PRN
Status: DISCONTINUED | OUTPATIENT
Start: 2018-09-20 | End: 2018-09-20

## 2018-09-20 RX ORDER — KETAMINE HYDROCHLORIDE 10 MG/ML
INJECTION, SOLUTION INTRAMUSCULAR; INTRAVENOUS PRN
Status: DISCONTINUED | OUTPATIENT
Start: 2018-09-20 | End: 2018-09-20

## 2018-09-20 RX ORDER — ONDANSETRON 2 MG/ML
4 INJECTION INTRAMUSCULAR; INTRAVENOUS EVERY 30 MIN PRN
Status: CANCELLED | OUTPATIENT
Start: 2018-09-20

## 2018-09-20 RX ORDER — MINERAL OIL
OIL (ML) MISCELLANEOUS PRN
Status: DISCONTINUED | OUTPATIENT
Start: 2018-09-20 | End: 2018-09-20 | Stop reason: HOSPADM

## 2018-09-20 RX ORDER — CIPROFLOXACIN 2 MG/ML
400 INJECTION, SOLUTION INTRAVENOUS
Status: COMPLETED | OUTPATIENT
Start: 2018-09-20 | End: 2018-09-20

## 2018-09-20 RX ORDER — ALBUMIN, HUMAN INJ 5% 5 %
SOLUTION INTRAVENOUS
Status: COMPLETED
Start: 2018-09-20 | End: 2018-09-20

## 2018-09-20 RX ORDER — GINSENG 100 MG
CAPSULE ORAL EVERY 8 HOURS
Status: COMPLETED | OUTPATIENT
Start: 2018-09-20 | End: 2018-09-21

## 2018-09-20 RX ORDER — NALOXONE HYDROCHLORIDE 0.4 MG/ML
.1-.4 INJECTION, SOLUTION INTRAMUSCULAR; INTRAVENOUS; SUBCUTANEOUS
Status: DISCONTINUED | OUTPATIENT
Start: 2018-09-20 | End: 2018-09-26 | Stop reason: HOSPADM

## 2018-09-20 RX ORDER — SIMVASTATIN 20 MG
40 TABLET ORAL EVERY EVENING
Status: DISCONTINUED | OUTPATIENT
Start: 2018-09-21 | End: 2018-09-26 | Stop reason: HOSPADM

## 2018-09-20 RX ORDER — LIDOCAINE HYDROCHLORIDE AND EPINEPHRINE 10; 10 MG/ML; UG/ML
INJECTION, SOLUTION INFILTRATION; PERINEURAL PRN
Status: DISCONTINUED | OUTPATIENT
Start: 2018-09-20 | End: 2018-09-20 | Stop reason: HOSPADM

## 2018-09-20 RX ORDER — SODIUM CHLORIDE 9 MG/ML
INJECTION, SOLUTION INTRAVENOUS CONTINUOUS PRN
Status: DISCONTINUED | OUTPATIENT
Start: 2018-09-20 | End: 2018-09-20

## 2018-09-20 RX ADMIN — PROPOFOL 5 MCG/KG/MIN: 10 INJECTION, EMULSION INTRAVENOUS at 17:03

## 2018-09-20 RX ADMIN — FENTANYL CITRATE 50 MCG: 50 INJECTION, SOLUTION INTRAMUSCULAR; INTRAVENOUS at 14:48

## 2018-09-20 RX ADMIN — CIPROFLOXACIN 400 MG: 2 INJECTION, SOLUTION INTRAVENOUS at 08:25

## 2018-09-20 RX ADMIN — ALBUMIN HUMAN: 0.05 INJECTION, SOLUTION INTRAVENOUS at 08:45

## 2018-09-20 RX ADMIN — ROCURONIUM BROMIDE 25 MG: 10 INJECTION INTRAVENOUS at 07:51

## 2018-09-20 RX ADMIN — Medication 100 MG: at 07:45

## 2018-09-20 RX ADMIN — FENTANYL CITRATE 50 MCG: 50 INJECTION, SOLUTION INTRAMUSCULAR; INTRAVENOUS at 08:50

## 2018-09-20 RX ADMIN — BACITRACIN: 500 OINTMENT TOPICAL at 19:45

## 2018-09-20 RX ADMIN — ALBUMIN HUMAN: 0.05 INJECTION, SOLUTION INTRAVENOUS at 12:31

## 2018-09-20 RX ADMIN — SODIUM CHLORIDE: 9 INJECTION, SOLUTION INTRAVENOUS at 12:00

## 2018-09-20 RX ADMIN — KETAMINE HCL-NACL SOLN PREF SY 50 MG/5ML-0.9% (10MG/ML) 10 MG: 10 SOLUTION PREFILLED SYRINGE at 12:27

## 2018-09-20 RX ADMIN — METRONIDAZOLE 500 MG: 500 INJECTION, SOLUTION INTRAVENOUS at 14:00

## 2018-09-20 RX ADMIN — KETAMINE HCL-NACL SOLN PREF SY 50 MG/5ML-0.9% (10MG/ML) 10 MG: 10 SOLUTION PREFILLED SYRINGE at 13:27

## 2018-09-20 RX ADMIN — CALCIUM CHLORIDE 0.5 G: 100 INJECTION, SOLUTION INTRAVENOUS at 08:44

## 2018-09-20 RX ADMIN — HYDROMORPHONE HYDROCHLORIDE 0.5 MG: 1 INJECTION, SOLUTION INTRAMUSCULAR; INTRAVENOUS; SUBCUTANEOUS at 15:16

## 2018-09-20 RX ADMIN — REMIFENTANIL HYDROCHLORIDE 0.1 MCG/KG/MIN: 1 INJECTION, POWDER, LYOPHILIZED, FOR SOLUTION INTRAVENOUS at 08:11

## 2018-09-20 RX ADMIN — SODIUM CHLORIDE, POTASSIUM CHLORIDE, SODIUM LACTATE AND CALCIUM CHLORIDE: 600; 310; 30; 20 INJECTION, SOLUTION INTRAVENOUS at 16:05

## 2018-09-20 RX ADMIN — METRONIDAZOLE 500 MG: 500 INJECTION, SOLUTION INTRAVENOUS at 21:31

## 2018-09-20 RX ADMIN — ALBUMIN HUMAN 12.5 G: 50 SOLUTION INTRAVENOUS at 17:15

## 2018-09-20 RX ADMIN — CIPROFLOXACIN 400 MG: 2 INJECTION, SOLUTION INTRAVENOUS at 14:25

## 2018-09-20 RX ADMIN — GABAPENTIN 300 MG: 300 CAPSULE ORAL at 05:56

## 2018-09-20 RX ADMIN — HYDROMORPHONE HYDROCHLORIDE 0.5 MG: 1 INJECTION, SOLUTION INTRAMUSCULAR; INTRAVENOUS; SUBCUTANEOUS at 14:55

## 2018-09-20 RX ADMIN — FENTANYL CITRATE 150 MCG: 50 INJECTION, SOLUTION INTRAMUSCULAR; INTRAVENOUS at 07:44

## 2018-09-20 RX ADMIN — MIDAZOLAM 1 MG: 1 INJECTION INTRAMUSCULAR; INTRAVENOUS at 11:11

## 2018-09-20 RX ADMIN — GLYCOPYRROLATE 0.2 MG: 0.2 INJECTION, SOLUTION INTRAMUSCULAR; INTRAVENOUS at 10:36

## 2018-09-20 RX ADMIN — SODIUM CHLORIDE, POTASSIUM CHLORIDE, SODIUM LACTATE AND CALCIUM CHLORIDE: 600; 310; 30; 20 INJECTION, SOLUTION INTRAVENOUS at 17:15

## 2018-09-20 RX ADMIN — PROPOFOL 20 MCG/KG/MIN: 10 INJECTION, EMULSION INTRAVENOUS at 20:12

## 2018-09-20 RX ADMIN — ROCURONIUM BROMIDE 30 MG: 10 INJECTION INTRAVENOUS at 14:48

## 2018-09-20 RX ADMIN — ROCURONIUM BROMIDE 20 MG: 10 INJECTION INTRAVENOUS at 09:11

## 2018-09-20 RX ADMIN — DEXMEDETOMIDINE HYDROCHLORIDE 0.5 MCG/KG/HR: 4 INJECTION, SOLUTION INTRAVENOUS at 15:48

## 2018-09-20 RX ADMIN — KETAMINE HCL-NACL SOLN PREF SY 50 MG/5ML-0.9% (10MG/ML) 10 MG: 10 SOLUTION PREFILLED SYRINGE at 14:27

## 2018-09-20 RX ADMIN — CALCIUM CHLORIDE 0.5 G: 100 INJECTION, SOLUTION INTRAVENOUS at 08:39

## 2018-09-20 RX ADMIN — VASOPRESSIN 0.5 UNITS: 20 INJECTION, SOLUTION INTRAMUSCULAR; SUBCUTANEOUS at 09:28

## 2018-09-20 RX ADMIN — ROCURONIUM BROMIDE 20 MG: 10 INJECTION INTRAVENOUS at 15:35

## 2018-09-20 RX ADMIN — Medication 50 MCG/HR: at 17:46

## 2018-09-20 RX ADMIN — DEXAMETHASONE SODIUM PHOSPHATE 10 MG: 4 INJECTION, SOLUTION INTRA-ARTICULAR; INTRALESIONAL; INTRAMUSCULAR; INTRAVENOUS; SOFT TISSUE at 08:51

## 2018-09-20 RX ADMIN — PROPOFOL 40 MG: 10 INJECTION, EMULSION INTRAVENOUS at 07:51

## 2018-09-20 RX ADMIN — ROCURONIUM BROMIDE 25 MG: 10 INJECTION INTRAVENOUS at 08:50

## 2018-09-20 RX ADMIN — KETAMINE HCL-NACL SOLN PREF SY 50 MG/5ML-0.9% (10MG/ML) 10 MG: 10 SOLUTION PREFILLED SYRINGE at 14:48

## 2018-09-20 RX ADMIN — GLYCOPYRROLATE 0.1 MG: 0.2 INJECTION, SOLUTION INTRAMUSCULAR; INTRAVENOUS at 08:24

## 2018-09-20 RX ADMIN — VASOPRESSIN 0.5 UNITS: 20 INJECTION, SOLUTION INTRAMUSCULAR; SUBCUTANEOUS at 09:25

## 2018-09-20 RX ADMIN — SENNOSIDES AND DOCUSATE SODIUM 1 TABLET: 8.6; 5 TABLET ORAL at 19:45

## 2018-09-20 RX ADMIN — HYDROMORPHONE HYDROCHLORIDE 0.5 MG: 1 INJECTION, SOLUTION INTRAMUSCULAR; INTRAVENOUS; SUBCUTANEOUS at 15:48

## 2018-09-20 RX ADMIN — KETAMINE HCL-NACL SOLN PREF SY 50 MG/5ML-0.9% (10MG/ML) 10 MG: 10 SOLUTION PREFILLED SYRINGE at 11:27

## 2018-09-20 RX ADMIN — ALBUMIN HUMAN: 0.05 INJECTION, SOLUTION INTRAVENOUS at 10:08

## 2018-09-20 RX ADMIN — SODIUM CHLORIDE, POTASSIUM CHLORIDE, SODIUM LACTATE AND CALCIUM CHLORIDE: 600; 310; 30; 20 INJECTION, SOLUTION INTRAVENOUS at 07:29

## 2018-09-20 RX ADMIN — CALCIUM CHLORIDE 0.25 G: 100 INJECTION, SOLUTION INTRAVENOUS at 14:17

## 2018-09-20 RX ADMIN — PROPOFOL 120 MG: 10 INJECTION, EMULSION INTRAVENOUS at 07:44

## 2018-09-20 RX ADMIN — HYDROMORPHONE HYDROCHLORIDE 0.5 MG: 1 INJECTION, SOLUTION INTRAMUSCULAR; INTRAVENOUS; SUBCUTANEOUS at 15:59

## 2018-09-20 RX ADMIN — MIDAZOLAM 2 MG: 1 INJECTION INTRAMUSCULAR; INTRAVENOUS at 07:31

## 2018-09-20 RX ADMIN — GLYCOPYRROLATE 0.7 MG: 0.2 INJECTION, SOLUTION INTRAMUSCULAR; INTRAVENOUS at 11:13

## 2018-09-20 RX ADMIN — SODIUM CHLORIDE: 9 INJECTION, SOLUTION INTRAVENOUS at 08:25

## 2018-09-20 RX ADMIN — PROPOFOL 25 MCG/KG/MIN: 10 INJECTION, EMULSION INTRAVENOUS at 15:48

## 2018-09-20 RX ADMIN — HYDROMORPHONE HYDROCHLORIDE 1 MG: 1 INJECTION, SOLUTION INTRAMUSCULAR; INTRAVENOUS; SUBCUTANEOUS at 16:20

## 2018-09-20 RX ADMIN — ALBUMIN HUMAN: 0.05 INJECTION, SOLUTION INTRAVENOUS at 09:07

## 2018-09-20 RX ADMIN — ALBUMIN HUMAN 12.5 G: 0.05 INJECTION, SOLUTION INTRAVENOUS at 17:15

## 2018-09-20 RX ADMIN — MIDAZOLAM 1 MG: 1 INJECTION INTRAMUSCULAR; INTRAVENOUS at 09:20

## 2018-09-20 RX ADMIN — METRONIDAZOLE 500 MG: 500 INJECTION, SOLUTION INTRAVENOUS at 08:00

## 2018-09-20 RX ADMIN — ACETAMINOPHEN 975 MG: 325 TABLET, FILM COATED ORAL at 05:55

## 2018-09-20 RX ADMIN — NEOSTIGMINE METHYLSULFATE 3.5 MG: 1 INJECTION, SOLUTION INTRAVENOUS at 11:13

## 2018-09-20 ASSESSMENT — ACTIVITIES OF DAILY LIVING (ADL)
COGNITION: 0 - NO COGNITION ISSUES REPORTED
TRANSFERRING: 0-->INDEPENDENT
DRESS: 0-->INDEPENDENT
SWALLOWING: 0-->SWALLOWS FOODS/LIQUIDS WITHOUT DIFFICULTY
FALL_HISTORY_WITHIN_LAST_SIX_MONTHS: NO
ADLS_ACUITY_SCORE: 10
BATHING: 0-->INDEPENDENT
AMBULATION: 0-->INDEPENDENT
RETIRED_COMMUNICATION: 0-->UNDERSTANDS/COMMUNICATES WITHOUT DIFFICULTY
RETIRED_EATING: 0-->INDEPENDENT
TOILETING: 0-->INDEPENDENT

## 2018-09-20 ASSESSMENT — ENCOUNTER SYMPTOMS
DYSRHYTHMIAS: 0
SEIZURES: 0

## 2018-09-20 NOTE — ANESTHESIA POSTPROCEDURE EVALUATION
Patient: Martita Burden    Procedure(s):  Right Maxillectomy, Right Neck Exploration, Left Forearm Free Flap, Split Thickness Skin Graft from Lower Extremity, Nasogastric Feeding Tube Placement - Wound Class: I-Clean   - Wound Class: I-Clean        Diagnosis:Hard Palate Cancer   Diagnosis Additional Information: No value filed.    Anesthesia Type:  No value filed.    Note:  Anesthesia Post Evaluation    Patient location during evaluation: ICU  Patient participation: Unable to evaluate secondary to administered sedation  Level of consciousness: responsive to physical stimuli  Pain management: adequate  Airway patency: patent  Cardiovascular status: acceptable  Respiratory status: acceptable  Hydration status: acceptable  PONV: none             Last vitals:  Vitals:    09/20/18 1730 09/20/18 1745 09/20/18 1800   BP:      Resp:      Temp:      SpO2: 100% 100% 100%         Electronically Signed By: Dilip Cordon MD  September 20, 2018  6:05 PM

## 2018-09-20 NOTE — H&P
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 gomes and Matt.    PLAN:   Neuro/ pain/ sedation:  - Monitor neurological status. Notify the MD for any acute changes in exam.  - Fentanyl for pain  - Propofol for sedation  - Gabapentin, tylenol scheduledd     Pulmonary care:   #remained intubated post-op 9/20  - Will remain intubated/ventilated overnight for possible airway swelling  - ET tube is sutured in place by ENT  - Vent VC-AC  - Supplemental oxygen to keep saturation above 92 %     Cardiovascular:   #HTN:    - Monitor hemodynamic status.   - No pressor requirements; page ENT if pressors mandatory  - Continue PTA ASA 325mg, lisinopril-hydrochlorothiazide, simvastatin     GI care:   - NG placed intraoperatively  - NPO except ice chips and medications.  - Senna BID     Fluids/ Electrolytes/ Nutrition:   - LR at 100ml/hr for IV fluid hydration  - qAM BMP  - No indication for parenteral nutrition.  - Electrolyte replacement protocol     Renal/ Fluid Balance:    - Intraop received 3L crystaloid, 500ml albumin,   - Urine output is adequate so far. Lanza in place  - Will continue to monitor intake and output.     Endocrine:    -No management indication.     ID/ Antibiotics:  #Perioperative prophylaxis:  - Ciprofloxacin, flagyl through 9/22 per ENT     Heme:     - Hemoglobin stable.  - qAM CBC     MSK:  - OT/PT when able     Prophylaxis:    -SCDs for mechanical prophylaxis for DVT.  -Heparin 5000u q8hrs     Lines/ tubes/ drains:  - PIVsx2, R radial art line, BRYAN, wound vac, lanza     Disposition:  -Surgical ICU    - - - - - - - - - - - - - - - - - - - - - - - - - - - - - - - - - - - - - - - - - - - - - - - - - - - - - - - - - -  - - - - - - - - - - - - - -     PRIMARY TEAM: ENT  PRIMARY PHYSICIAN: Dr. Gutierrez    REASON FOR CRITICAL CARE ADMISSION: Intubation/ventilator requirements, frequent flap checks.   ADMITTING PHYSICIAN: Dr. Smith    HISTORY PRESENTING ILLNESS: Martita Burden is a 63 yo female s/p right maxillectomy, right neck exploration, right forearm free flap, split thickness skin graft on 9/20/2018 by Dr. Garza and Matt in treatment of right palate mass that was first discovered over a year ago. EBL was 250 ml, she received a total 3 L of crystalloid and 500 ml of colloid.     REVIEW OF SYSTEMS: Patient intubated and sedated, unable to attain ROS.    PAST MEDICAL HISTORY:   Past Medical History:   Diagnosis Date     Allergic rhinitis     Seasonal     Cancer (H) April     Hypertension 10 yrs ago       SURGICAL HISTORY:   Past Surgical History:   Procedure Laterality Date     HYSTERECTOMY       ORTHOPEDIC SURGERY  2/4/2017    Repaired broken ankle       SOCIAL HISTORY: Tobacco - yes, 1/2 ppd for 30 years    FAMILY HISTORY: No bleeding/clotting disorders nor problems with anesthesia.    ALLERGIES:      Allergies   Allergen Reactions     Penicillins      Sulfa Drugs        MEDICATIONS:    No current facility-administered medications on file prior to encounter.   Current Outpatient Prescriptions on File Prior to Encounter:  Calcium-Magnesium-Zinc 333-133-5 MG TABS per tablet Take 1 tablet by mouth 2 times daily (with meals)    lisinopril-hydrochlorothiazide (PRINZIDE/ZESTORETIC) 10-12.5 MG per tablet Take 1 tablet by mouth every morning    nicotine (NICODERM CQ) 14 MG/24HR 24 hr patch Place 1 patch onto the skin every 24 hours    aspirin 325 MG EC tablet Take 325 mg by mouth every morning ON HOLD FOR SURGERY SINCE 09/10/2018   Flaxseed, Linseed, ([employer] FLAXSEED OIL) 1000 MG CAPS Take 1,000 mg by mouth 2 times daily ON HOLD FOR SURGERY SINCE 09/10/2018       PHYSICAL EXAMINATION:  Temp:  [97.8  F (36.6  C)] 97.8  F (36.6   C)  Resp:  [18] 18  BP: (147)/(87) 147/87  SpO2:  [99 %] 99 %  General: NAD, intubated, sedated  HEENT/Neck: Normocephalic, right-sided neck incision with penrose drain  Respiratory: Lung sounds clear to auscultation b/l with vent sounds  Cardiovascular: Regular rate and rhythm.  Gastrointestinal: Abdomen soft, non-distended, non-tender to palpation. No organomegaly or masses appreciated.  Extremities: No limb deformities. No pedal edema. Peripheral pulses present. Left forearm wrapped in kerlex with wound vac line coming through, BRYAN with minimal serosanguinous output exiting just distal to left antecubital fossa  Skin: As noted above. No rashes or lesions appreciated.    LABS: Reviewed.   Arterial Blood Gases     Recent Labs  Lab 09/20/18 1410 09/20/18  1118 09/20/18 0920   PH 7.37 7.38 7.36   PCO2 41 42 42   PO2 72* 72* 72*   HCO3 23 25 24     Complete Blood Count     Recent Labs  Lab 09/20/18  1410 09/20/18  1118 09/20/18  0920 09/17/18  1216   WBC  --   --   --  5.3   HGB 9.7* 10.2* 10.9* 14.3   PLT  --   --   --  299     Basic Metabolic Panel    Recent Labs  Lab 09/20/18  1410 09/20/18  1118 09/20/18  0920 09/17/18  1216    138 139 138   POTASSIUM 3.8 4.1 3.1* 4.2   CHLORIDE  --   --   --  106   CO2  --   --   --  24   BUN  --   --   --  17   CR  --   --   --  0.83   * 122* 148* 95     Liver Function Tests    Recent Labs  Lab 09/17/18  1216   INR 0.94     Pancreatic Enzymes  No lab results found in last 7 days.  Coagulation Profile    Recent Labs  Lab 09/17/18  1216   INR 0.94     Lactate  Invalid input(s): LACTATE    IMAGING:  No results found for this or any previous visit.    Patient seen, findings and plan discussed with surgical ICU staff Denilson Arias MD, agree with the above documentation by student doctor Jose Castellon and have made any necessary edits to the note.    Denilson Ramos, PGY2  General Surgery  [phone]

## 2018-09-20 NOTE — ANESTHESIA PROCEDURE NOTES
Arterial Line Procedure Note  Staff:     Anesthesiologist:  RENETTA GUEVARA    Resident/CRNA:  EMY ALEXANDER    Arterial line performed by resident/CRNA in presence of a teaching physician    Location: In OR After Induction  Procedure Start/Stop Times:     patient identified, IV checked, site marked, risks and benefits discussed, informed consent, monitors and equipment checked, pre-op evaluation and at physician/surgeon's request      Correct Patient: Yes      Correct Position: Yes      Correct Site: Yes      Correct Procedure: Yes      Correct Laterality:  Yes    Site Marked:  Yes  Line Placement:     Procedure:  Arterial Line    Insertion Site:  Radial    Insertion laterality:  Right    Skin Prep: Chloraprep      Patient Prep: patient draped, mask, sterile gloves, hat and hand hygiene      Local skin infiltration:  None    Ultrasound Guided?: Yes      Artery evaluated via ultrasound confirming patency.   Using realtime imaging, the artery was punctured and the needle was observed entering the artery.      A permanent image is entered into patient's chart.      Catheter size:  20 gauge, Quick cath    Cath secured with: suture      Dressing:  Tegaderm    Complications:  None obvious    Arterial waveform: Yes      IBP within 10% of NIBP: Yes

## 2018-09-20 NOTE — LETTER
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]  Admit Date/Time: 2018  5:14 AM  Discharge Date: 18  Payor Source: Payor: BLUE PLUS / Plan: BLUE PLUS MA / Product Type: HMO /            Reason for Communication Hand-off Referral:  Recent hospitalization    Discharge Plan:       Concern for non-adherence with plan of care: No  Discharge Needs Assessment:  Needs       Most Recent Value    Equipment Currently Used at Home none    Transportation Available car    Home Care "[facility] ([place]) [phone], Fax: [phone]    Home Infusion Provider [facility] [phone], Fax: [phone]          Follow-up plan:  Future Appointments  Date Time Provider Department Center   10/1/2018 10:30 AM Terrence Gutierrez MD [facility]       Any outstanding tests or procedures:        Referrals     Future Labs/Procedures    Cardiology Eval Adult Referral     Comments:    Preferred location:  [facility] with Dr. Diggs when she returns to [facility] for her post-op wound check    Please be aware that coverage of these services is subject to the terms and limitations of your health insurance plan.  Call member services at your health plan with any benefit or coverage questions.      Please bring the following to your appointment:  Any x-rays, CTs or MRIs which have been performed. Contact the facility where they were done to arrange for  prior to your scheduled appointment.    List of current medications  This referral request   Any documents/labs given to you for this referral    Home care nursing referral     Comments:    RN skilled nursing visit. RN to assess vital signs and  weight, respiratory and cardiac status, patients ability to take and record daily blood pressure, temp and weight, pain level and activity tolerance, incision for signs/symptoms of infection, hydration, nutrition and bowel status, home safety and daily wound care/DME supplies until pt and family able to complete independently at home.  BRYAN drains, TF oversight and care.  RN to teach tube feedings.  RN to provide tube site care and management and oversight of the TF/ wound cares.    [facility]  Phone  [phone]  Fax  [phone]  Intake: [phone]     Your provider has ordered home care nursing services. If you have not been contacted within 2 days of your discharge please call the inpatient department phone number at [phone] .    Home infusion referral     Comments:    Your provider has referred you to: [facility]: [facility] [place] [phone]   [email or web address]    Local Address (if different from home address): N/A    Anticipated Length of Therapy: 3 months pending progression of incision and swallow post surgical.    Home Infusion Pharmacist to adjust therapy based on labs and clinical assessments: Yes  Agency Staff to assess nursing needs for Infusion Therapy.  Bolus TF: 1 can Isosource 4 times daily            Key Recommendations:      Lynne Barragan RN  6B care coordinator #[phone]

## 2018-09-20 NOTE — PROGRESS NOTES
Patient admitted to 4A ICU s/p Right Maxillectomy. Patient placed on a CMV 16 500 100% +10. 7.0 ETT is sutured left cheek, 22 cm at the lip.

## 2018-09-20 NOTE — IP AVS SNAPSHOT
[address] [address]    Phone:  [phone]                                       After Visit Summary   9/20/2018    Ms. Martita Burden    MRN: [id number]           After Visit Summary Signature Page     I have received my discharge instructions, and my questions have been answered. I have discussed any challenges I see with this plan with the nurse or doctor.    ..........................................................................................................................................  Patient/Patient Representative Signature      ..........................................................................................................................................  Patient Representative Print Name and Relationship to Patient    ..................................................               ................................................  Date                                   Time    ..........................................................................................................................................  Reviewed by Signature/Title    ...................................................              ..............................................  Date                                               Time          22EPIC Rev 08/18

## 2018-09-20 NOTE — PROGRESS NOTES
Brief ENT Note  9/20/2018    Paged by nursing after an XR tech tripped over the ventilation tubing and there was concern for the endotracheal tube coming out a small amount. There was a desaturation to 89% that resolved quickly.     On exam, the lip suture and the retromolar trigone suture are completely intact. The tube is secured with the cheek suture at 22 cm, which is where it was secured at the time of the procedure.     CXR shows ETT in place in the airway several centimeters above the mt.     Flap is intact. Warm, well perfused. Appropriate color. Doppler is strong.     A/P: Flap is stable. ETT is secure. Continue current plan of care.    Antonio Zapata MD  PGY-5, Otolaryngology

## 2018-09-20 NOTE — BRIEF OP NOTE
[facility], [place]    Brief Operative Note    Pre-operative diagnosis: Hard Palate Cancer   Post-operative diagnosis * No post-op diagnosis entered *  Procedure: Procedure(s):  Right Maxillectomy, Right Neck Exploration, Left Forearm Free Flap, Split Thickness Skin Graft from Lower Extremity, Nasogastric Feeding Tube Placement - Wound Class: I-Clean   - Wound Class: I-Clean      Surgeon: Surgeon(s) and Role:  Panel 1:     * Leslie Garza MD - Primary     * Tri Nguyen MD - Resident - Assisting     * Antonio Zapata MD - Resident - Assisting    Panel 2:     * Terrence Gutierrez MD - Primary     * Antonio Zapata MD - Resident - Assisting     * Tri Nguyen MD - Resident - Assisting     * Shira Lou MD - Assisting  Anesthesia: General   Estimated blood loss: 250 mL  Drains: [place]-R[place]  Specimens:   ID Type Source Tests Collected by Time Destination   A : right soft palate margin Tissue Mouth SURGICAL PATHOLOGY EXAM Leslie Garza MD 9/20/2018  9:02 AM    B : hard palate mucosal margin Tissue Mouth SURGICAL PATHOLOGY EXAM Leslie Garza MD 9/20/2018  9:02 AM    C : right alveolar mucosal margin Tissue Mouth SURGICAL PATHOLOGY EXAM Leslie Garza MD 9/20/2018  9:02 AM    D : right gingival buccal mucosal margin Tissue Mouth SURGICAL PATHOLOGY EXAM Leslie Garza MD 9/20/2018  9:03 AM    E : Intra Nasal Biopsy Tissue Other SURGICAL PATHOLOGY EXAM Leslie Garza MD 9/20/2018 10:11 AM    F : Nasal Septum Mucosal Margin Tissue Other SURGICAL PATHOLOGY EXAM Leslie Garza MD 9/20/2018 10:34 AM    G : Right Maxillary Sinus Content Tissue Sinus Contents, Maxillary, Right SURGICAL PATHOLOGY EXAM Leslie Garza MD 9/20/2018 10:36 AM    H : Resection Nasal Septum Mucosa Tissue Other SURGICAL PATHOLOGY EXAM Leslie Garza MD 9/20/2018 10:54 AM    I : 2nd margin of nasal septum mucosa Tissue Other  SURGICAL PATHOLOGY EXAM Leslie Garza MD 9/20/2018 10:55 AM    J : right level 1B lymph node Tissue Neck SURGICAL PATHOLOGY EXAM Leslie Garza MD 9/20/2018 11:35 AM    K : right level 1B lymph node #2 Tissue Neck SURGICAL PATHOLOGY EXAM Leslie Garza MD 9/20/2018 11:43 AM    L : Right Maxillectomy Tissue Maxilla SURGICAL PATHOLOGY EXAM Leslie Garza MD 9/20/2018 12:23 PM      Findings:   Firm, right palate mass consistent with the tumor. Infrastructure maxillectomy performed. Left radial forearm free flap performed for reconstruction..  Complications: None.  Implants: None.

## 2018-09-20 NOTE — ANESTHESIA CARE TRANSFER NOTE
Patient: Martita Burden    Procedure(s):  Right Maxillectomy, Right Neck Exploration, Left Forearm Free Flap, Split Thickness Skin Graft from Lower Extremity, Nasogastric Feeding Tube Placement - Wound Class: I-Clean   - Wound Class: I-Clean        Diagnosis: Hard Palate Cancer   Diagnosis Additional Information: No value filed.    Anesthesia Type:   No value filed.     Note:  Airway :ETT  Patient transferred to:ICU  Comments: To ICU4A, VSS, airway patent, on Vent, continued sedation from OR, free flap doppler in pace, dressings C/D/I. RN at bedside, report given.ICU Handoff: Call for PAUSE to initiate/utilize ICU HANDOFF, Identified Patient, Identified Responsible Provider, Reviewed the Pertinent Medical History, Discussed Surgical Course, Reviewed Intra-OP Anesthesia Management and Issues during Anesthesia, Set Expectations for Post Procedure Period and Allowed Opportunity for Questions and Acknowledgement of Understanding      Vitals: (Last set prior to Anesthesia Care Transfer)    CRNA VITALS  9/20/2018 1547 - 9/20/2018 1643      9/20/2018             Pulse: 93    ART BP: 137/63                Electronically Signed By: PARVEZ Khan CRNA  September 20, 2018  4:43 PM

## 2018-09-20 NOTE — PHARMACY-CONSULT NOTE
"The following home medications were NOT continued on inpatient admission per \"Discontinuation of nonessential home medications during hospitalization\" policy: calcium-Mag-Zinc 333-133-5mg supplement    If a therapeutic holiday is deemed inappropriate per the prescriber, please notify the pharmacist regarding the medication order.    The pharmacist is available to answer any questions and/or concerns the patient may have regarding discontinuation of non-essential medications.    Please ensure that these medications are restarted as needed upon discharge via the medication reconciliation discharge process and included on the discharge medication reconciliation report.    Thank you,  David Mccray, PharmD, BCPS    "

## 2018-09-20 NOTE — IP AVS SNAPSHOT
"    [address] [facility]: [phone]                                              INTERAGENCY TRANSFER FORM - PHYSICIAN ORDERS   2018                    Hospital Admission Date: 2018  LUCA HOLDER   : 1955  Sex: Female        Attending Provider: Leslie Garza MD     Allergies:  Penicillins, Sulfa Drugs    Infection:  None   Service:  OTOLARYNGOLO    Ht:  1.651 m (5' 5\")   Wt:  92.9 kg (204 lb 12.9 oz)   Admission Wt:  95.5 kg (210 lb 8.6 oz)    BMI:  34.08 kg/m 2   BSA:  2.06 m 2            Patient PCP Information     Provider PCP REJI Castro [facility]      ED Clinical Impression     Diagnosis Description Comment Added By Time Added    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]  Suzanne Tanner RN 2018 11:02 AM    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]  Suzanne Tanner RN 2018 11:02 AM    Acute post-operative pain [G89.18] Acute post-operative pain [G89.18]  María Cheng PA-C 2018  8:49 AM    Drug-induced constipation [K59.03] Drug-induced constipation [K59.03]  María Cheng PA-C 2018  8:51 AM    Nutritional deficiency [E63.9] Nutritional deficiency [E63.9]  María Cheng PA-C 2018  8:52 AM    Paroxysmal atrial fibrillation (H) [I48.0] Paroxysmal atrial fibrillation (H) [I48.0]  Celestino Azul MD 2018  6:11 PM      Hospital Problems as of 2018              Priority Class Noted POA    Hypertension Medium  2013 Yes    Tobacco abuse Medium  2013 Yes    Hyperlipidemia Medium  2014 Yes    Clear cell carcinoma (H) Medium  2018 Yes    * (Principal)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12 % solution   Commonly known as:  PERIDEX        Dose:  15 mL   Swish and spit 15 mLs in mouth every 8 hours   Quantity:  473 mL   Refills:  0        metoprolol 10 mg/mL Susp   Commonly known as:  LOPRESSOR   Used for:  Paroxysmal atrial fibrillation (H)        Dose:  25 mg   2.5 mLs (25 mg) by Per NG tube route 2 times daily for 14 days   Quantity:  70 mL   Refills:  0        mineral oil-hydrophilic petrolatum        Apply topically every 8 hours Apply to neck incision   Quantity:  50 g   Refills:  1        multivitamins with minerals Liqd liquid   Used for:  Nutritional deficiency        Dose:  15 mL   15 mLs by Per Feeding Tube route daily   Quantity:  1 Bottle   Refills:  1        order for DME        Equipment being ordered: Nasogastric bolus tube feeding supplies Formula: Isosource 1.5, 4 cans per day Barto feeding bags 60 mL syringes  Treatment Diagnosis: clear cell adenocarcinoma   Quantity:  14 days   Refills:  1        order for DME        Equipment being ordered: Wound care supplies, 1 each daily x 21 days Xeroform occlusive gauze 5\" x 9\" Telfa non-adherent pad 8\" x 3\" Kerlix bandage roll 4-1/2\" x 4-1/8 yd ACE wrap, 4 inch (5 total)  Diagnosis: clear cell adenocarcinoma   Quantity:  21 days   Refills:  1        oxyCODONE IR 5 MG tablet   Commonly known as:  ROXICODONE   Used for:  Acute post-operative pain        Dose:  5-10 mg   1-2 tablets (5-10 mg) by Per Feeding Tube route every 3 hours as needed for moderate to severe pain   Quantity:  45 tablet   Refills:  0        senna-docusate " 8.6-50 MG per tablet   Commonly known as:  SENOKOT-S;PERICOLACE   Used for:  Drug-induced constipation        Dose:  2 tablet   2 tablets by Per Feeding Tube route 2 times daily as needed for constipation   Quantity:  60 tablet   Refills:  0          CONTINUE these medications which have NOT CHANGED        Dose / Directions Comments    aspirin 325 MG EC tablet        Dose:  325 mg   Take 325 mg by mouth every morning ON HOLD FOR SURGERY SINCE 09/10/2018   Refills:  0        Calcium-Magnesium-Zinc 333-133-5 MG Tabs per tablet        Dose:  1 tablet   Take 1 tablet by mouth 2 times daily (with meals)   Refills:  0        [employer] FLAXSEED OIL 1000 MG Caps        Dose:  1000 mg   Take 1,000 mg by mouth 2 times daily ON HOLD FOR SURGERY SINCE 09/10/2018   Refills:  0        lisinopril-hydrochlorothiazide 10-12.5 MG per tablet   Commonly known as:  PRINZIDE/ZESTORETIC        Dose:  1 tablet   Take 1 tablet by mouth every morning   Refills:  0        nicotine 14 MG/24HR 24 hr patch   Commonly known as:  NICODERM CQ        Dose:  1 patch   Place 1 patch onto the skin every 24 hours   Refills:  6        simvastatin 40 MG tablet   Commonly known as:  ZOCOR        Dose:  40 mg   Take 40 mg by mouth every evening   Refills:  0        TYLENOL PO        Dose:  1000 mg   Take 1,000 mg by mouth as needed for mild pain or fever   Refills:  0                Summary of Visit     Reason for your hospital stay       Post-operative care             After Care     Activity       Your activity upon discharge: No heavy lifting greater than 10 lbs and no strenuous exercise for 2 weeks or until follow up appointment. No driving while taking narcotic pain medications.       Diet       Follow this diet upon discharge:  Nothing to eat or drink by mouth. Bolus tube feeding via nasogastric feeding tube. Formula: Isosource 1.5, 4 cans per day. Give 1 can 4 times daily, separate feedings by 3-4 hours. Flush tube with 90 mL of water before and after each  bolus feeding and with 30 mL of water before and after medications.       Supplies       List the supplies the pt needs to go home:  DCing rn please send pt home with 4 days of wound care supplies for all incisions and wounds.       Wound care and dressings       Instructions to care for your wound at home: Keep incisions clean and dry. Apply Aquaphor ointment to incisions three times daily to keep moist. You may shower, do not soak, scrub, or submerge incisions under water.     Daily dressing changes to left forearm: Remove old dressing. Clean incisions with saline. Apply Aquaphor ointment to linear incision and incision edges around skin graft site. Cover skin graft with xeroform gauze. Cover linear incision with Telfa non-stick dressing. Wrap forearm with Kerlix gauze roll and cover with ACE wrap.     Thigh skin graft donor site: Cover with calcium alginate and tegaderm, change dressing as needed for drainage. Once site is no longer draining you may leave open to air and apply Aquaphor ointment to keep moist.             Referrals     Cardiology Eval Adult Referral       Preferred location:  [facility] with Dr. Diggs when she returns to [facility] for her post-op wound check    Please be aware that coverage of these services is subject to the terms and limitations of your health insurance plan.  Call member services at your health plan with any benefit or coverage questions.      Please bring the following to your appointment:  Any x-rays, CTs or MRIs which have been performed. Contact the facility where they were done to arrange for  prior to your scheduled appointment.    List of current medications  This referral request   Any documents/labs given to you for this referral       Home care nursing referral       RN skilled nursing visit. RN to assess vital signs and weight, respiratory and cardiac status, patients ability to take and record daily blood pressure, temp and weight, pain level and activity tolerance,  incision for signs/symptoms of infection, hydration, nutrition and bowel status, home safety and daily wound care/DME supplies until pt and family able to complete independently at home.  BRYAN drains, TF oversight and care.  RN to teach tube feedings.  RN to provide tube site care and management and oversight of the TF/ wound cares.    [facility]  Phone  [phone]  Fax  [phone]  Intake: [phone]     Your provider has ordered home care nursing services. If you have not been contacted within 2 days of your discharge please call the inpatient department phone number at [phone] .       Home infusion referral       Your provider has referred you to: [facility]: [facility] [phone]   [email or web address]    Local Address (if different from home address): N/A    Anticipated Length of Therapy: 3 months pending progression of incision and swallow post surgical.    Home Infusion Pharmacist to adjust therapy based on labs and clinical assessments: Yes  Agency Staff to assess nursing needs for Infusion Therapy.  Bolus TF: 1 can Isosource 4 times daily              MD face to face encounter       Documentation of Face to Face and Certification for Home Health Services    I certify that patient: Martita Burden is under my care and that I, or a nurse practitioner or physician's assistant working with me, had a face-to-face encounter that meets the physician face-to-face encounter requirements with this patient on: 9/24/2018.    This encounter with the patient was in whole, or in part, for the following medical condition, which is the primary reason for home health care: Flap, incisions, Intraoral flap, Wong-perdomo incision, BRYAN drains, Daily wound cares, [place] Tube feedings    I certify that, based on my findings, the following services are medically necessary home health services: Nursing.    My clinical findings support the need for the above  services because: Nurse is needed: For complex aftercare of surgical procedures because the patient needs instruction and cannot perform care on their own due to: wound care needs and visual checks to all wounds including oral., To assess incisions/flap surgery after changes in medications or other medical regimen., To provide assessment and oversight required in the home to assure adherence to the medical plan due to: wound care/TF needs. and To provide caregiver training to assist with: Wound care and TF needs and teaching..    Further, I certify that my clinical findings support that this patient is homebound (i.e. absences from home require considerable and taxing effort and are for medical reasons or [employer] or infrequently or of short duration when for other reasons) because: Requires assistance of another person or specialized equipment to access medical services because patient: Requires supervision of another for safe transfer...    Based on the above findings. I certify that this patient is confined to the home and needs intermittent skilled nursing care, physical therapy and/or speech therapy.  The patient is under my care, and I have initiated the establishment of the plan of care.  This patient will be followed by a physician who will periodically review the plan of care.  Physician/Provider to provide follow up care: Jolly Rodas    Attending hospital physician (the Medicare certified [facility] provider): Leslie Garza MD  Physician Signature: See electronic signature associated with these discharge orders.  Date: 9/24/2018                  Your next 10 appointments already scheduled     Oct 01, 2018 10:30 AM CDT   (Arrive by 10:15 AM)   Return Visit with Terrence Gutierrez MD   [facility] ([facility])    [address] [place] [id number] [phone]              Follow-Up Appointment Instructions     Future  Labs/Procedures    Adult [facility] Follow-up and recommended labs and tests     Comments:    Follow up in ENT clinic with Dr. Gutierrez on Monday, 10/1/2018 at 10:30AM. You will receive a phone call with an appointment date/time. Please call the clinic with questions/concerns: [phone].    Otolaryngology/ENT Clinic:  [facility] [place] [address]    Appointments on [facility]/or [facility] (with [facility] or [facility] provider or service). Call [phone] if you haven't heard regarding these appointments within 7 days of discharge.      Follow-Up Appointment Instructions     Adult [facility] Follow-up and recommended labs and tests       Follow up in ENT clinic with Dr. Gutierrez on Monday, 10/1/2018 at 10:30AM. You will receive a phone call with an appointment date/time. Please call the clinic with questions/concerns: [phone].    Otolaryngology/ENT Clinic:  [address] [address]    Appointments on [facility]/or [facility] (with [facility] or [facility] provider or service). Call [phone] if you haven't heard regarding these appointments within 7 days of discharge.             Statement of Approval     Ordered          09/26/18 0938  I have reviewed and agree with all the recommendations and orders detailed in this document.  EFFECTIVE NOW     Approved and electronically signed by:  María Cheng PA-C           09/25/18 1149  I have reviewed and agree with all the recommendations and orders detailed in this document.  EFFECTIVE NOW     Approved and electronically signed by:  María Cheng PA-C

## 2018-09-20 NOTE — SIGNIFICANT EVENT
1722: CXR tech tripped on vent tubing after shooting CXR, one of lip sutures undone (suture in mouth still secure), tube appears to have come out a bit. O2S dropped to 89%, back up to 99% Anesthesia at bedside, updated. Updated SICU via telephone-unable to come to bedside at this time. Paged ENT to bedside.  1750: ENT at bedside, repositioned ETT. 2nd CXR done w/ENT at bedside. O2 sats 99-00%. Resolved.

## 2018-09-20 NOTE — PROGRESS NOTES
Admitted/transferred from: OR  Reason for admission/transfer: Right Maxillectomy, Right Neck Exploration, Left Forearm Free Flap, Split Thickness Skin Graft from Lower Extremity, Nasogastric Feeding Tube Placement  Patient status upon admission/transfer: Intubated, sedated, VSS. Bedside report done w/anesthesia, ENT.  Interventions: Patient connected to SICU monitors. Doppler in place in R neck, strong signal. Flap check: pale, soft, good signal.  Plan: Monitor overnight in SICU, potentially extubate in AM.  2 RN skin assessment: completed by writer and Alla Matos RN  Result of skin assessment and interventions/actions: R face incision-sutured, ANTHONY, R hard palate sutures ANTHONY, R neck incision sutured w/penrose drain-ANTHONY, scant sanguinous output (expected, per ENT), L skin graft site w/calcium algenate dressing + tegaderm, L forearm graft site covered by splint, wound vac and BRYAN in place. No pressure sores. Protective foam dressing on coccyx.  Height, weight, drug calc weight: done  Patient belongings: None  MDRO education (if applicable): n/a

## 2018-09-20 NOTE — IP AVS SNAPSHOT
MRN:[id number]                      After Visit Summary   9/20/2018    Ms. Martita Burden    MRN: [id number]           Thank you!     Thank you for choosing [facility] for your care. Our goal is always to provide you with excellent care. Hearing back from our patients is one way we can continue to improve our services. Please take a few minutes to complete the written survey that you may receive in the mail after you visit with us. Thank you!        Patient Information     Date Of Birth          1955        Designated Caregiver       Most Recent Value    Caregiver    Will someone help with your care after discharge? yes    Name of designated caregiver ye    Phone number of caregiver [phone]    Caregiver address [place]      About your hospital stay     You were admitted on:  September 20, 2018 You last received care in the:  [facility]    You were discharged on:  September 26, 2018        Reason for your hospital stay       Post-operative care                  Who to Call     For medical emergencies, please call 911.  For non-urgent questions about your medical care, please call your primary care provider or clinic, [phone]  For questions related to your surgery, please call your surgery clinic        Attending Provider     Provider Specialty    Terrence Gutierrez MD Otolaryngology    Shira Smith MD Anesthesiology    Leslie Garza MD Otolaryngology       Primary Care Provider Office Phone # Fax #    Jolly Ngsj-Kerrie [phone]       When to contact your care team       Please notify your doctor if you experience wound breakdown, sustained bleeding from the wound site, or increasing redness, swelling, and/or purulent malorodorous discharge from the wound site which may indicate infection. If you feel it is acute, or experience sudden changes in breathing, chest pain, or excessive sleepiness/somnolence please return to the emergency department or  "call 911. If you have questions or concerns during the day please call ENT clinic and [phone]. If at night you can call [facility] at [phone] and ask for the \"ENT resident on call\".                  After Care Instructions     Activity       Your activity upon discharge: No heavy lifting greater than 10 lbs and no strenuous exercise for 2 weeks or until follow up appointment. No driving while taking narcotic pain medications.            Diet       Follow this diet upon discharge:  Nothing to eat or drink by mouth. Bolus tube feeding via nasogastric feeding tube. Formula: Isosource 1.5, 4 cans per day. Give 1 can 4 times daily, separate feedings by 3-4 hours. Flush tube with 90 mL of water before and after each bolus feeding and with 30 mL of water before and after medications.            Supplies       List the supplies the pt needs to go home:  DCing rn please send pt home with 4 days of wound care supplies for all incisions and wounds.            Wound care and dressings       Instructions to care for your wound at home: Keep incisions clean and dry. Apply Aquaphor ointment to incisions three times daily to keep moist. You may shower, do not soak, scrub, or submerge incisions under water.     Daily dressing changes to left forearm: Remove old dressing. Clean incisions with saline. Apply Aquaphor ointment to linear incision and incision edges around skin graft site. Cover skin graft with xeroform gauze. Cover linear incision with Telfa non-stick dressing. Wrap forearm with Kerlix gauze roll and cover with ACE wrap.     Thigh skin graft donor site: Cover with calcium alginate and tegaderm, change dressing as needed for drainage. Once site is no longer draining you may leave open to air and apply Aquaphor ointment to keep moist.                  Follow-up Appointments     Adult [facility] Follow-up and recommended labs and tests       Follow up in ENT clinic with Dr. Gutierrez on Monday, 10/1/2018 at " 10:30AM. You will receive a phone call with an appointment date/time. Please call the clinic with questions/concerns: [phone].    Otolaryngology/ENT Clinic:  [facility]  [address] [place] [address]    Follow up with Dr. Diggs in cardiology clinic in 1 week after discharge as directed. Please call the cardiology clinic with questions/concerns: [phone]    Appointments on [facility] and/or [facility] (with [facility] or [facility] provider or service). Call [phone] if you haven't heard regarding these appointments within 7 days of discharge.                  Your next 10 appointments already scheduled     Oct 01, 2018 10:30 AM CDT   (Arrive by 10:15 AM)   Return Visit with Terrence Gutierrez MD   [facility] ([facility])    [address] [place] [address]  4th [facility] [address] [phone]              Additional Services     Cardiology Eval Adult Referral       Preferred location:  [facility] with Dr. Diggs when she returns to [facility] for her post-op wound check    Please be aware that coverage of these services is subject to the terms and limitations of your health insurance plan.  Call member services at your health plan with any benefit or coverage questions.      Please bring the following to your appointment:  Any x-rays, CTs or MRIs which have been performed. Contact the facility where they were done to arrange for  prior to your scheduled appointment.    List of current medications  This referral request   Any documents/labs given to you for this referral            Home care nursing referral       RN skilled nursing visit. RN to assess vital signs and weight, respiratory and cardiac status, patients ability to take and record daily blood pressure, temp and weight, pain level and activity tolerance, incision for signs/symptoms of infection, hydration, nutrition and bowel status, home  safety and daily wound care/DME supplies until pt and family able to complete independently at home.  BRYAN drains, TF oversight and care.  RN to teach tube feedings.  RN to provide tube site care and management and oversight of the TF/ wound cares.    [facility]  Phone  [phone]  Fax  [phone]  Intake: [phone]     Your provider has ordered home care nursing services. If you have not been contacted within 2 days of your discharge please call the inpatient department phone number at [phone] .            Home infusion referral       Your provider has referred you to: [facility]: [facility] [phone]   [email or web address]    Local Address (if different from home address): N/A    Anticipated Length of Therapy: 3 months pending progression of incision and swallow post surgical.    Home Infusion Pharmacist to adjust therapy based on labs and clinical assessments: Yes  Agency Staff to assess nursing needs for Infusion Therapy.  Bolus TF: 1 can Isosource 4 times daily                  Pending Results     Date and Time Order Name Status Description    9/25/2018 1204 EKG 12-lead, complete Preliminary     9/20/2018 0904 Surgical pathology exam Preliminary             Statement of Approval     Ordered          09/26/18 0938  I have reviewed and agree with all the recommendations and orders detailed in this document.  EFFECTIVE NOW     Approved and electronically signed by:  María Cheng PA-C           09/25/18 1145  I have reviewed and agree with all the recommendations and orders detailed in this document.  EFFECTIVE NOW     Approved and electronically signed by:  María Cheng PA-C             Admission Information     Date & Time Provider Department Dept. Phone    9/20/2018 Leslie Garza MD [address] [facility] [phone]      Your Vitals Were     Blood Pressure Pulse Temperature Respirations Height Weight    121/68  "(BP Location: Right arm) 95 97  F (36.1  C) (Axillary) 18 1.651 m (5' 5\") 92.9 kg (204 lb 12.9 oz)    Pulse Oximetry BMI (Body Mass Index)                98% 34.08 kg/m2          MyChart Information     [employer] gives you secure access to your electronic health record. If you see a primary care provider, you can also send messages to your care team and make appointments. If you have questions, please call your primary care clinic.  If you do not have a primary care provider, please call [phone] and they will assist you.        Care EveryWhere ID     This is your Care EveryWhere ID. This could be used by other organizations to access your [facility] medical records  [id number]        Equal Access to Services     JJ CARRION : Ade Newell, nabil antonio, jodie ding, karolina urena. So [facility] [phone].    ATENCIÓN: Si habla español, tiene a robison disposición servicios gratuitos de asistencia lingüística. Llame al [phone].    We comply with applicable federal civil rights laws and [place] laws. We do not discriminate on the basis of race, color, national origin, age, disability, sex, sexual orientation, or gender identity.               Review of your medicines      START taking        Dose / Directions    chlorhexidine 0.12 % solution   Commonly known as:  PERIDEX        Dose:  15 mL   Swish and spit 15 mLs in mouth every 8 hours   Quantity:  473 mL   Refills:  0       metoprolol 10 mg/mL Susp   Commonly known as:  LOPRESSOR   Used for:  Paroxysmal atrial fibrillation (H)        Dose:  25 mg   2.5 mLs (25 mg) by Per NG tube route 2 times daily for 14 days   Quantity:  70 mL   Refills:  0       mineral oil-hydrophilic petrolatum        Apply topically every 8 hours Apply to neck incision   Quantity:  50 g   Refills:  1       multivitamins with minerals Liqd liquid   Used for:  Nutritional deficiency        Dose:  15 mL   15 mLs by Per Feeding Tube " "route daily   Quantity:  1 Bottle   Refills:  1       order for DME        Equipment being ordered: Nasogastric bolus tube feeding supplies Formula: Isosource 1.5, 4 cans per day [place] feeding bags 60 mL syringes  Treatment Diagnosis: clear cell adenocarcinoma   Quantity:  14 days   Refills:  1       order for DME        Equipment being ordered: Wound care supplies, 1 each daily x 21 days Xeroform occlusive gauze 5\" x 9\" Telfa non-adherent pad 8\" x 3\" Kerlix bandage roll 4-1/2\" x 4-1/8 yd ACE wrap, 4 inch (5 total)  Diagnosis: clear cell adenocarcinoma   Quantity:  21 days   Refills:  1       oxyCODONE IR 5 MG tablet   Commonly known as:  ROXICODONE   Used for:  Acute post-operative pain        Dose:  5-10 mg   1-2 tablets (5-10 mg) by Per Feeding Tube route every 3 hours as needed for moderate to severe pain   Quantity:  45 tablet   Refills:  0       senna-docusate 8.6-50 MG per tablet   Commonly known as:  SENOKOT-S;PERICOLACE   Used for:  Drug-induced constipation        Dose:  2 tablet   2 tablets by Per Feeding Tube route 2 times daily as needed for constipation   Quantity:  60 tablet   Refills:  0         CONTINUE these medicines which have NOT CHANGED        Dose / Directions    aspirin 325 MG EC tablet        Dose:  325 mg   Take 325 mg by mouth every morning ON HOLD FOR SURGERY SINCE 09/10/2018   Refills:  0       Calcium-Magnesium-Zinc 333-133-5 MG Tabs per tablet        Dose:  1 tablet   Take 1 tablet by mouth 2 times daily (with meals)   Refills:  0       [employer] FLAXSEED OIL 1000 MG Caps        Dose:  1000 mg   Take 1,000 mg by mouth 2 times daily ON HOLD FOR SURGERY SINCE 09/10/2018   Refills:  0       lisinopril-hydrochlorothiazide 10-12.5 MG per tablet   Commonly known as:  PRINZIDE/ZESTORETIC        Dose:  1 tablet   Take 1 tablet by mouth every morning   Refills:  0       nicotine 14 MG/24HR 24 hr patch   Commonly known as:  NICODERM CQ        Dose:  1 patch   Place 1 patch onto the skin every 24 " hours   Refills:  6       simvastatin 40 MG tablet   Commonly known as:  ZOCOR        Dose:  40 mg   Take 40 mg by mouth every evening   Refills:  0       TYLENOL PO        Dose:  1000 mg   Take 1,000 mg by mouth as needed for mild pain or fever   Refills:  0            Where to get your medicines      These medications were sent to [facility] - [place], [place] - [address], [facility] [address]     Phone:  [phone]     chlorhexidine 0.12 % solution    metoprolol 10 mg/mL Susp    mineral oil-hydrophilic petrolatum    multivitamins with minerals Liqd liquid    senna-docusate 8.6-50 MG per tablet         Some of these will need a paper prescription and others can be bought over the counter. Ask your nurse if you have questions.     Bring a paper prescription for each of these medications     order for DME    order for DME    oxyCODONE IR 5 MG tablet                Protect others around you: Learn how to safely use, store and throw away your medicines at www.disposemymeds.org.        Information about OPIOIDS     PRESCRIPTION OPIOIDS: WHAT YOU NEED TO KNOW   We gave you an opioid (narcotic) pain medicine. It is important to manage your pain, but opioids are not always the best choice. You should first try all the other options your care team gave you. Take this medicine for as short a time (and as few doses) as possible.    Some activities can increase your pain, such as bandage changes or therapy sessions. It may help to take your pain medicine 30 to 60 minutes before these activities. Reduce your stress by getting enough sleep, working on hobbies you enjoy and practicing relaxation or meditation. Talk to your care team about ways to manage your pain beyond prescription opioids.    These medicines have risks:    DO NOT drive when on new or higher doses of pain medicine. These medicines can affect your alertness and reaction times, and you could be arrested for  driving under the influence (DUI). If you need to use opioids long-term, talk to your care team about driving.    DO NOT operate heavy machinery    DO NOT do any other dangerous activities while taking these medicines.    DO NOT drink any alcohol while taking these medicines.     If the opioid prescribed includes acetaminophen, DO NOT take with any other medicines that contain acetaminophen. Read all labels carefully. Look for the word  acetaminophen  or  Tylenol.  Ask your pharmacist if you have questions or are unsure.    You can get addicted to pain medicines, especially if you have a history of addiction (chemical, alcohol or substance dependence). Talk to your care team about ways to reduce this risk.    All opioids tend to cause constipation. Drink plenty of water and eat foods that have a lot of fiber, such as fruits, vegetables, prune juice, apple juice and high-fiber cereal. Take a laxative (Miralax, milk of magnesia, Colace, Senna) if you don t move your bowels at least every other day. Other side effects include upset stomach, sleepiness, dizziness, throwing up, tolerance (needing more of the medicine to have the same effect), physical dependence and slowed breathing.    Store your pills in a secure place, locked if possible. We will not replace any lost or stolen medicine. If you don t finish your medicine, please throw away (dispose) as directed by your pharmacist. The [place] Pollution Control Agency has more information about safe disposal: https://www.pca.[facility].[place].us/living-green/managing-unwanted-medications             Medication List: This is a list of all your medications and when to take them. Check marks below indicate your daily home schedule. Keep this list as a reference.      Medications           Morning Afternoon Evening Bedtime As Needed    aspirin 325 MG EC tablet   Take 325 mg by mouth every morning ON HOLD FOR SURGERY SINCE 09/10/2018                                 "Calcium-Magnesium-Zinc 333-133-5 MG Tabs per tablet   Take 1 tablet by mouth 2 times daily (with meals)                                chlorhexidine 0.12 % solution   Commonly known as:  PERIDEX   Swish and spit 15 mLs in mouth every 8 hours   Last time this was given:  15 mLs on 9/26/2018  8:09 AM                                [employer]S FLAXSEED OIL 1000 MG Caps   Take 1,000 mg by mouth 2 times daily ON HOLD FOR SURGERY SINCE 09/10/2018                                lisinopril-hydrochlorothiazide 10-12.5 MG per tablet   Commonly known as:  PRINZIDE/ZESTORETIC   Take 1 tablet by mouth every morning   Last time this was given:  1 tablet on 9/26/2018  8:09 AM                                metoprolol 10 mg/mL Susp   Commonly known as:  LOPRESSOR   2.5 mLs (25 mg) by Per NG tube route 2 times daily for 14 days   Last time this was given:  25 mg on 9/26/2018  8:09 AM                                mineral oil-hydrophilic petrolatum   Apply topically every 8 hours Apply to neck incision   Last time this was given:  9/26/2018  8:10 AM                                multivitamins with minerals Liqd liquid   15 mLs by Per Feeding Tube route daily   Last time this was given:  15 mLs on 9/26/2018  8:09 AM                                nicotine 14 MG/24HR 24 hr patch   Commonly known as:  NICODERM CQ   Place 1 patch onto the skin every 24 hours                                order for DME   Equipment being ordered: Nasogastric bolus tube feeding supplies Formula: Isosource 1.5, 4 cans per day [place] feeding bags 60 mL syringes  Treatment Diagnosis: clear cell adenocarcinoma                                order for DME   Equipment being ordered: Wound care supplies, 1 each daily x 21 days Xeroform occlusive gauze 5\" x 9\" Telfa non-adherent pad 8\" x 3\" Kerlix bandage roll 4-1/2\" x 4-1/8 yd ACE wrap, 4 inch (5 total)  Diagnosis: clear cell adenocarcinoma                                oxyCODONE IR 5 MG tablet   Commonly known as:  " ROXICODONE   1-2 tablets (5-10 mg) by Per Feeding Tube route every 3 hours as needed for moderate to severe pain                                senna-docusate 8.6-50 MG per tablet   Commonly known as:  SENOKOT-S;PERICOLACE   2 tablets by Per Feeding Tube route 2 times daily as needed for constipation   Last time this was given:  2 tablets on 9/24/2018  8:45 PM                                simvastatin 40 MG tablet   Commonly known as:  ZOCOR   Take 40 mg by mouth every evening   Last time this was given:  40 mg on 9/25/2018  9:00 PM                                TYLENOL PO   Take 1,000 mg by mouth as needed for mild pain or fever   Last time this was given:  975 mg on 9/25/2018  9:00 PM

## 2018-09-20 NOTE — OP NOTE
Date of Procedure: 9/22/2018    Attending Physician: Leslie Garza MD    Resident Physicians:   1. Antonio Zapata MD  2. Tri Stafford    Procedure Performed:  Infrastructure maxillectomy with lateral rhinotomy  Right neck exploration     Preoperative Diagnosis: Clear cell carcinoma    Postoperative Diagnosis: same    Anesthesia: General    Blood loss: 100 cc    Specimens:   ID Type Source Tests Collected by Time Destination   A : right soft palate margin Tissue Mouth SURGICAL PATHOLOGY EXAM Leslie Garza MD 9/20/2018  9:02 AM     B : hard palate mucosal margin Tissue Mouth SURGICAL PATHOLOGY EXAM Leslie Garza MD 9/20/2018  9:02 AM     C : right alveolar mucosal margin Tissue Mouth SURGICAL PATHOLOGY EXAM Leslie Garza MD 9/20/2018  9:02 AM     D : right gingival buccal mucosal margin Tissue Mouth SURGICAL PATHOLOGY EXAM Leslie Garza MD 9/20/2018  9:03 AM     E : Intra Nasal Biopsy Tissue Other SURGICAL PATHOLOGY EXAM Leslie Garza MD 9/20/2018 10:11 AM     F : Nasal Septum Mucosal Margin Tissue Other SURGICAL PATHOLOGY EXAM Leslie Garza MD 9/20/2018 10:34 AM     G : Right Maxillary Sinus Content Tissue Sinus Contents, Maxillary, Right SURGICAL PATHOLOGY EXAM Leslie Garza MD 9/20/2018 10:36 AM     H : Resection Nasal Septum Mucosa Tissue Other SURGICAL PATHOLOGY EXAM Leslie Garza MD 9/20/2018 10:54 AM     I : 2nd margin of nasal septum mucosa Tissue Other SURGICAL PATHOLOGY EXAM Leslie Garza MD 9/20/2018 10:55 AM     J : right level 1B lymph node Tissue Neck SURGICAL PATHOLOGY EXAM Leslie Garza MD 9/20/2018 11:35 AM     K : right level 1B lymph node #2 Tissue Neck SURGICAL PATHOLOGY EXAM Leslie Garza MD 9/20/2018 11:43 AM     L : Right Maxillectomy Tissue Maxilla SURGICAL PATHOLOGY EXAM Leslie Garza MD 9/20/2018 12:23 PM          Implants:  Per Dr Gutierrez    Complications: None    Findings:  Mass of the right hard palate  with extension into the nasal cavity along the nasal floor  Negative margins with exception of focus of carcinoma along the nasal septum - cleared on second resection  Facial artery and facial vein identified with preservation of marginal mandibular nerve    Indications:  Martita Burden is a 62 year old woman who has a history of a right hard palate mass, biopsy consistent with a clear cell carcinoma. She is indicated for an infrastructure maxillectomy with free flap reconstruction.     Description of Procedure:  After informed consent was obtained, the patient was brought back to the main operating room and placed in a supine position. General anesthesia was induced, and the patient was orotracheally intubated and the tube was sutured to the lower gingiva and RMT. The bed was turned 180 degrees from anesthesia. A lanza and arterial line were placed.  A tarsorrhaphy suture was placed in the right eyelid using a 6-0 Prolene.  The patient was prepped and draped in sterile fashion. A time out was performed with verification of correct side of procedure.    The oral cavity was inspected and the patient was found to have a mass of the right hard palate extending to approximately midline. The planned cuts along the hard palate and buccal mucosa were marked with a marking pen, taking care to leave a 1 cm margin around the palpable edges of the mass. The planned resection extended along the left hard palate, across the right alveolus at approximately the site of the right canine (sparing the midline alveolus), and within the gingivobuccal sulcus, along the maxillary tuberosity, then back to the hard palate-soft palate junction. The monopolar cautery was used to make mucosal incisions circumferentially around the mass. Margins were sent for frozen section on the oral cavity side which were all negative.      The planned lateral rhinotomy incision was marked along the right philtrum, the nasal ala, and the lateral nasal wall  extending toward the medial canthus. This was injected with 1% lidocaine with 1:100,000 epinephrine. A 15 blade was used to make the lateral rhinotomy incision down through the skin. The incision was extended through the facial muscles and subcutaneous tissues with a monopolar cautery. The piriform aperture was identified along with the right nasal bone. The anterior face of the bony maxilla was then identified. The lateral rhinotomy incision was then connected intraorally at the midline lip and then extended into a sublabial incision.  The sublabial incision was made along the right buccal mucosa, connecting to the previously made incisions around the tumor. The sublabial incision was extended down to bone along the anterior face of the maxilla. The cheek skin flap was then raised off the anterior maxilla, taking care to preserve the infraorbital nerve.  The soft tissue flap was raised laterally out to the zygoma.     The hard palate mucosal cuts were extended down to the bone with the monopolar cautery. A freer was used to carefully raise the hard palate mucosa off the hard palate on the left side. The reciprocating saw was then used to make the bony cuts along the left hard palate, right maxillary alveolus and then connecting to the piriform aperture. These cuts were deepened using an osteotome. The reciprocating saw was then used to make our superior bony cut along the anterior maxillary wall, below the level of the infraorbital foramen, taking care to stay inferior to the orbit. This was extended laterally toward the zygomaticomaxillary suture line. Osteotomes were again used to complete our cuts at which point we were partially able to see into the maxillary sinus.  With this, we were able to visualize the tumor extending into the nasal cavity through the floor, abutting the inferior turbinate and the septum. The maxillary crest of the nasal septum was taken in continuity with the specimen. A piece of the  tumor was sent for frozen section to try to verify its identity. The reciprocating saw was then used to make our lateral cut near the lateral buttress. The curved maxillary scissors were used to make our posterior cuts along the pterygoid plates and pterygoid musculature. The entire specimen was removed and was ultimately taken to pathology for orientation.  A freer was used to bluntly remove all the maxillary sinus mucosa within the remnant maxillary sinus and this was sent for permanent section. Margins were sent from the nasal septum where the tumor abutted the floor, and was positive in a small portion. An additional resection of the nasal septum was sent for permanent pathology and then a second margin was negative on frozen section from the septal mucosa. The wound was thoroughly irrigated and hemostasis was achieved.     Attention was then turned to the right neck.  The facial notch was identified on palpation.  The planned incision was marked about a centimeter below this.  This was injected with 1% lidocaine with 1:100,000 epinephrine.  A 15 blade was used to make an incision through the skin.  This was extended down through the platysma.  At this point the disposable nerve stimulator was used to identify the level of the marginal mandibular nerve.  Blunt dissection was performed to identify the nerve and traced anteriorly and posteriorly along its length.  Once the nerve was clearly in view, dissection was performed inferior to the nerve. The inferior border of the submandibular gland was identified.  The facial vein was identified along the lateral border of the submandibular gland and dissected.  Blunt dissection was then performed along the superior aspect of the submandibular gland on the lateral surface, just below the level of the marginal mandibular nerve. The facial artery could not be identified here. A small node was present and was sent for permanent pathology. We then extended our dissection  along the lateral border of the submandibular gland. Again, there was a larger level IB node that was identified and dissected free and sent for permanent pathology. We retracted the submandibular gland superiorly to try to identify the facial artery along the digastric. The digastric muscle was dissected out anteriorly and posteriorly. The veins along the hypoglossal nerve were identified and preserved. The hypoglossal nerve was traced out. Dissection was performed posteriorly along the gland until a small diameter facial artery was identified. The vein and artery were dissected free.      At this point the patient was handed over to Dr. Gutierrez's team for reconstruction.  She tolerated the ablative portion of the procedure well with no immediate complications.  I was present for and participated in the entire ablative portion of the procedure.       Leslie Garza MD    Department of Otolaryngology

## 2018-09-21 ENCOUNTER — APPOINTMENT (OUTPATIENT)
Dept: OCCUPATIONAL THERAPY | Facility: CLINIC | Age: 63
End: 2018-09-21
Attending: OTOLARYNGOLOGY
Payer: COMMERCIAL

## 2018-09-21 LAB
ALBUMIN SERPL-MCNC: 3.2 G/DL (ref 3.4–5)
ANION GAP SERPL CALCULATED.3IONS-SCNC: 10 MMOL/L (ref 3–14)
BASE EXCESS BLDA CALC-SCNC: 1.1 MMOL/L
BUN SERPL-MCNC: 12 MG/DL (ref 7–30)
CALCIUM SERPL-MCNC: 8.2 MG/DL (ref 8.5–10.1)
CHLORIDE SERPL-SCNC: 107 MMOL/L (ref 94–109)
CO2 SERPL-SCNC: 23 MMOL/L (ref 20–32)
CREAT SERPL-MCNC: 0.65 MG/DL (ref 0.52–1.04)
ERYTHROCYTE [DISTWIDTH] IN BLOOD BY AUTOMATED COUNT: 14 % (ref 10–15)
GFR SERPL CREATININE-BSD FRML MDRD: >90 ML/MIN/1.7M2
GLUCOSE SERPL-MCNC: 120 MG/DL (ref 70–99)
HCO3 BLD-SCNC: 25 MMOL/L (ref 21–28)
HCT VFR BLD AUTO: 26.3 % (ref 35–47)
HGB BLD-MCNC: 8.8 G/DL (ref 11.7–15.7)
INR PPP: 1.17 (ref 0.86–1.14)
MAGNESIUM SERPL-MCNC: 1.8 MG/DL (ref 1.6–2.3)
MCH RBC QN AUTO: 32.5 PG (ref 26.5–33)
MCHC RBC AUTO-ENTMCNC: 33.5 G/DL (ref 31.5–36.5)
MCV RBC AUTO: 97 FL (ref 78–100)
O2/TOTAL GAS SETTING VFR VENT: 40 %
OXYHGB MFR BLD: 97 % (ref 92–100)
PCO2 BLD: 34 MM HG (ref 35–45)
PH BLD: 7.47 PH (ref 7.35–7.45)
PHOSPHATE SERPL-MCNC: 2.3 MG/DL (ref 2.5–4.5)
PLATELET # BLD AUTO: 197 10E9/L (ref 150–450)
PO2 BLD: 145 MM HG (ref 80–105)
POTASSIUM SERPL-SCNC: 3.9 MMOL/L (ref 3.4–5.3)
PREALB SERPL IA-MCNC: 22 MG/DL (ref 15–45)
RBC # BLD AUTO: 2.71 10E12/L (ref 3.8–5.2)
SODIUM SERPL-SCNC: 140 MMOL/L (ref 133–144)
TSH SERPL DL<=0.005 MIU/L-ACNC: 0.84 MU/L (ref 0.4–4)
WBC # BLD AUTO: 10.1 10E9/L (ref 4–11)

## 2018-09-21 PROCEDURE — 25000132 ZZH RX MED GY IP 250 OP 250 PS 637: Performed by: PHYSICIAN ASSISTANT

## 2018-09-21 PROCEDURE — 25000132 ZZH RX MED GY IP 250 OP 250 PS 637: Performed by: STUDENT IN AN ORGANIZED HEALTH CARE EDUCATION/TRAINING PROGRAM

## 2018-09-21 PROCEDURE — 83735 ASSAY OF MAGNESIUM: CPT | Performed by: ANESTHESIOLOGY

## 2018-09-21 PROCEDURE — 27210429 ZZH NUTRITION PRODUCT INTERMEDIATE LITER

## 2018-09-21 PROCEDURE — 85610 PROTHROMBIN TIME: CPT | Performed by: ANESTHESIOLOGY

## 2018-09-21 PROCEDURE — 25000128 H RX IP 250 OP 636: Performed by: STUDENT IN AN ORGANIZED HEALTH CARE EDUCATION/TRAINING PROGRAM

## 2018-09-21 PROCEDURE — 84443 ASSAY THYROID STIM HORMONE: CPT | Performed by: ANESTHESIOLOGY

## 2018-09-21 PROCEDURE — 20000004 ZZH R&B ICU UMMC

## 2018-09-21 PROCEDURE — 94003 VENT MGMT INPAT SUBQ DAY: CPT

## 2018-09-21 PROCEDURE — E2402 NEG PRESS WOUND THERAPY PUMP: HCPCS

## 2018-09-21 PROCEDURE — 40000133 ZZH STATISTIC OT WARD VISIT: Performed by: OCCUPATIONAL THERAPIST

## 2018-09-21 PROCEDURE — 84134 ASSAY OF PREALBUMIN: CPT | Performed by: ANESTHESIOLOGY

## 2018-09-21 PROCEDURE — 99233 SBSQ HOSP IP/OBS HIGH 50: CPT | Mod: GC | Performed by: ANESTHESIOLOGY

## 2018-09-21 PROCEDURE — 80069 RENAL FUNCTION PANEL: CPT | Performed by: ANESTHESIOLOGY

## 2018-09-21 PROCEDURE — 82805 BLOOD GASES W/O2 SATURATION: CPT | Performed by: STUDENT IN AN ORGANIZED HEALTH CARE EDUCATION/TRAINING PROGRAM

## 2018-09-21 PROCEDURE — 40000275 ZZH STATISTIC RCP TIME EA 10 MIN

## 2018-09-21 PROCEDURE — 97110 THERAPEUTIC EXERCISES: CPT | Mod: GO | Performed by: OCCUPATIONAL THERAPIST

## 2018-09-21 PROCEDURE — 85027 COMPLETE CBC AUTOMATED: CPT | Performed by: ANESTHESIOLOGY

## 2018-09-21 PROCEDURE — 25000125 ZZHC RX 250: Performed by: STUDENT IN AN ORGANIZED HEALTH CARE EDUCATION/TRAINING PROGRAM

## 2018-09-21 PROCEDURE — 97535 SELF CARE MNGMENT TRAINING: CPT | Mod: GO | Performed by: OCCUPATIONAL THERAPIST

## 2018-09-21 PROCEDURE — 40000014 ZZH STATISTIC ARTERIAL MONITORING DAILY

## 2018-09-21 PROCEDURE — 97165 OT EVAL LOW COMPLEX 30 MIN: CPT | Mod: GO | Performed by: OCCUPATIONAL THERAPIST

## 2018-09-21 RX ORDER — OXYCODONE HYDROCHLORIDE 5 MG/1
5-10 TABLET ORAL
Status: DISCONTINUED | OUTPATIENT
Start: 2018-09-21 | End: 2018-09-26 | Stop reason: HOSPADM

## 2018-09-21 RX ORDER — ONDANSETRON 2 MG/ML
4 INJECTION INTRAMUSCULAR; INTRAVENOUS ONCE
Status: COMPLETED | OUTPATIENT
Start: 2018-09-21 | End: 2018-09-21

## 2018-09-21 RX ORDER — HYDROMORPHONE HYDROCHLORIDE 1 MG/ML
.3-.5 INJECTION, SOLUTION INTRAMUSCULAR; INTRAVENOUS; SUBCUTANEOUS
Status: DISCONTINUED | OUTPATIENT
Start: 2018-09-21 | End: 2018-09-24

## 2018-09-21 RX ORDER — AMINO ACIDS/PROTEIN HYDROLYS 11G-40/45
1 LIQUID IN PACKET (ML) ORAL 3 TIMES DAILY
Status: DISCONTINUED | OUTPATIENT
Start: 2018-09-21 | End: 2018-09-26 | Stop reason: HOSPADM

## 2018-09-21 RX ORDER — CAFFEINE 200 MG
200 TABLET ORAL DAILY
Status: DISCONTINUED | OUTPATIENT
Start: 2018-09-21 | End: 2018-09-22

## 2018-09-21 RX ORDER — ACETAMINOPHEN 325 MG/1
975 TABLET ORAL EVERY 8 HOURS
Status: DISPENSED | OUTPATIENT
Start: 2018-09-21 | End: 2018-09-26

## 2018-09-21 RX ADMIN — ONDANSETRON 4 MG: 2 INJECTION INTRAMUSCULAR; INTRAVENOUS at 13:23

## 2018-09-21 RX ADMIN — BACITRACIN: 500 OINTMENT TOPICAL at 13:23

## 2018-09-21 RX ADMIN — SENNOSIDES AND DOCUSATE SODIUM 1 TABLET: 8.6; 5 TABLET ORAL at 20:13

## 2018-09-21 RX ADMIN — CIPROFLOXACIN 400 MG: 2 INJECTION, SOLUTION INTRAVENOUS at 14:21

## 2018-09-21 RX ADMIN — CHLORHEXIDINE GLUCONATE 0.12% ORAL RINSE 15 ML: 1.2 LIQUID ORAL at 15:59

## 2018-09-21 RX ADMIN — Medication 1 PACKET: at 09:48

## 2018-09-21 RX ADMIN — CIPROFLOXACIN 400 MG: 2 INJECTION, SOLUTION INTRAVENOUS at 01:26

## 2018-09-21 RX ADMIN — Medication 0.3 MG: at 13:20

## 2018-09-21 RX ADMIN — POTASSIUM PHOSPHATE, MONOBASIC AND POTASSIUM PHOSPHATE, DIBASIC 15 MMOL: 224; 236 INJECTION, SOLUTION INTRAVENOUS at 06:46

## 2018-09-21 RX ADMIN — LISINOPRIL AND HYDROCHLOROTHIAZIDE 1 TABLET: 12.5; 1 TABLET ORAL at 08:53

## 2018-09-21 RX ADMIN — METRONIDAZOLE 500 MG: 500 INJECTION, SOLUTION INTRAVENOUS at 16:06

## 2018-09-21 RX ADMIN — SODIUM CHLORIDE, POTASSIUM CHLORIDE, SODIUM LACTATE AND CALCIUM CHLORIDE: 600; 310; 30; 20 INJECTION, SOLUTION INTRAVENOUS at 03:08

## 2018-09-21 RX ADMIN — Medication 0.5 MG: at 01:20

## 2018-09-21 RX ADMIN — METRONIDAZOLE 500 MG: 500 INJECTION, SOLUTION INTRAVENOUS at 09:57

## 2018-09-21 RX ADMIN — MULTIVITAMIN 15 ML: LIQUID ORAL at 09:47

## 2018-09-21 RX ADMIN — HEPARIN SODIUM 5000 UNITS: 5000 INJECTION, SOLUTION INTRAVENOUS; SUBCUTANEOUS at 09:57

## 2018-09-21 RX ADMIN — BACITRACIN: 500 OINTMENT TOPICAL at 20:14

## 2018-09-21 RX ADMIN — SENNOSIDES AND DOCUSATE SODIUM 1 TABLET: 8.6; 5 TABLET ORAL at 08:53

## 2018-09-21 RX ADMIN — SIMVASTATIN 40 MG: 20 TABLET, FILM COATED ORAL at 20:13

## 2018-09-21 RX ADMIN — BACITRACIN: 500 OINTMENT TOPICAL at 03:10

## 2018-09-21 RX ADMIN — METRONIDAZOLE 500 MG: 500 INJECTION, SOLUTION INTRAVENOUS at 03:09

## 2018-09-21 RX ADMIN — WHITE PETROLATUM: 1.75 OINTMENT TOPICAL at 16:53

## 2018-09-21 RX ADMIN — HEPARIN SODIUM 5000 UNITS: 5000 INJECTION, SOLUTION INTRAVENOUS; SUBCUTANEOUS at 17:28

## 2018-09-21 RX ADMIN — SODIUM CHLORIDE, POTASSIUM CHLORIDE, SODIUM LACTATE AND CALCIUM CHLORIDE: 600; 310; 30; 20 INJECTION, SOLUTION INTRAVENOUS at 15:44

## 2018-09-21 RX ADMIN — ACETAMINOPHEN 975 MG: 325 TABLET, FILM COATED ORAL at 17:24

## 2018-09-21 RX ADMIN — Medication 1 PACKET: at 20:14

## 2018-09-21 RX ADMIN — PROPOFOL 25 MCG/KG/MIN: 10 INJECTION, EMULSION INTRAVENOUS at 03:07

## 2018-09-21 RX ADMIN — CHLORHEXIDINE GLUCONATE 0.12% ORAL RINSE 15 ML: 1.2 LIQUID ORAL at 08:57

## 2018-09-21 RX ADMIN — METRONIDAZOLE 500 MG: 500 INJECTION, SOLUTION INTRAVENOUS at 22:40

## 2018-09-21 RX ADMIN — CHLORHEXIDINE GLUCONATE 0.12% ORAL RINSE 15 ML: 1.2 LIQUID ORAL at 00:04

## 2018-09-21 RX ADMIN — ONDANSETRON 4 MG: 2 INJECTION INTRAMUSCULAR; INTRAVENOUS at 11:05

## 2018-09-21 RX ADMIN — ASPIRIN 325 MG ORAL TABLET 325 MG: 325 PILL ORAL at 08:53

## 2018-09-21 RX ADMIN — ACETAMINOPHEN 975 MG: 325 TABLET, FILM COATED ORAL at 09:47

## 2018-09-21 RX ADMIN — Medication 0.5 MG: at 20:13

## 2018-09-21 ASSESSMENT — ACTIVITIES OF DAILY LIVING (ADL)
ADLS_ACUITY_SCORE: 10
PREVIOUS_RESPONSIBILITIES: MEAL PREP;HOUSEKEEPING;LAUNDRY;SHOPPING;DRIVING;WORK
ADLS_ACUITY_SCORE: 10
ADLS_ACUITY_SCORE: 11
ADLS_ACUITY_SCORE: 10

## 2018-09-21 ASSESSMENT — PAIN DESCRIPTION - DESCRIPTORS
DESCRIPTORS: HEADACHE
DESCRIPTORS: HEADACHE

## 2018-09-21 NOTE — PROGRESS NOTES
"ENT Free Flap Check  September 20, 2018    S: No issues per nursing with the flap. MAP is currently over 60 and has not went below per nursing. Patient is still intubated but alert and following commands. She has no acute concerns.    O: /87  Temp 99.4  F (37.4  C) (Axillary)  Resp 16  Ht 1.65 m (5' 4.96\")  Wt 98.7 kg (217 lb 9.5 oz)  SpO2 100%  BMI 36.25 kg/m2  General: alert, following commands, intubated, mouthing words around tube.  HEENT: Flap appears viable, warm, soft, without evidence of congestion. Strong implantable doppler signal. Incisions lines clean, intact. Neck is soft and flat without signs of hematoma.  Extremities: Left extremity dressing is in place. Fingers are warm and well perfused. Patient is able to move them to commands.    A/P: Martita Burden is a 62 year old female, POD #1 s/p Right Maxillectomy, Right Neck Exploration, Left Forearm Free Flap, Split Thickness Skin Graft from Lower Extremity  -- Stable flap, continue current plan of care      Edward Stephenson  PGY-2  Otolaryngology-Head & Neck Surgery  To contact ENT please dial * * [phone] and enter job code [phone].    "

## 2018-09-21 NOTE — PLAN OF CARE
Problem: Patient Care Overview  Goal: Plan of Care/Patient Progress Review  4AB-PT: Hold: PT consult received and appreciated. Per discussion with OT, pt limited in OT session by dizziness. Anticipate single discipline needs. PT to hold and will reassess 9/22 for IP PT needs.

## 2018-09-21 NOTE — PLAN OF CARE
Problem: Patient Care Overview  Goal: Plan of Care/Patient Progress Review  OT 4AB: Discharge Planner OT   Patient plan for discharge: home with assist  Current status: Pt is alert and appropriately interactive, recently extubated and VSS on RA.  Pt receptive to education on post surgical precautions/restrictions and implications for mobility and self cares, min vc for compliance throughout session.  SBA - CGA supine to EOB, sit to stand and bedside transfers.  Further mobility limited by lightheadedness today.   Barriers to return to prior living situation: acute medical needs, decreased activity tolerance, post surgical precautions  Recommendations for discharge: Home with assist  Rationale for recommendations: While pt is currently below baseline with regards to mobility and independence with self cares, anticipate pt will progress well with therapy during admission and be able to safely return to prior living environment.  Pt has good family support and fairly accessible living environment.       Entered by: Hailee Kemp 09/21/2018 1:31 PM

## 2018-09-21 NOTE — PLAN OF CARE
"Problem: Patient Care Overview  Goal: Plan of Care/Patient Progress Review  Outcome: Improving  Extubated at 0930 to RA. Up to chair, mobilizing independently in bed, sits at edge of bed. Flap intact, soft and pale pink, dryness requiring saline rinses q1-2h. Denies pain at flap/surgical sites, c/o mild headache likened to \"caffeine headache,\" caffeine tabs ordered. VSS, arterial line d/c'd. TF started via NG, became nauseous, TF on hold until tomorrow per ENT request. Sommers d/c'd, due to void. Hypoactive bowel sounds. Thigh dressing changed d/t saturation, no drainage from left arm. MIVF at 100cc.hr.     Plan: q2h flap checks. Restart TF tomorrow. Continue plan of care.      "

## 2018-09-21 NOTE — PROGRESS NOTES
" 09/21/18 1100   Quick Adds   Type of Visit Initial Occupational Therapy Evaluation   Living Environment   Lives With child(feli), adult;grandchild(feli)  (daughter and grandchildren (19 yo))   Living Arrangements house   Home Accessibility stairs to enter home;tub/shower is not walk in   Number of Stairs to Enter Home 2   Number of Stairs Within Home 0   Transportation Available car   Living Environment Comment Pt reports she lives in a 1 level home - no accessibility concerns reported by patient   Self-Care   Dominant Hand right   Usual Activity Tolerance good   Current Activity Tolerance fair   Regular Exercise no   Equipment Currently Used at Home none   Activity/Exercise/Self-Care Comment Pt was previously independent with all ADL/IADLs.  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  pt reports that her family is able to assist prn; however, at baseline each person does their own cooking, cleaning, laundry, etc.   Functional Level Prior   Ambulation 0-->independent   Transferring 0-->independent   Toileting 0-->independent   Bathing 0-->independent   Dressing 0-->independent   Eating 0-->independent   Communication 0-->understands/communicates without difficulty   Cognition 0 - no cognition issues reported   Fall history within last six months no   Which of the above functional risks had a recent onset or change? ambulation;transferring;toileting;bathing;dressing   Prior Functional Level Comment Pt reports L ankle fracture with hardware repair ~ 1 year ago sustained as result of \"jumping off a rock\"       Present no   Language english   General Information   Onset of Illness/Injury or Date of Surgery - Date 09/20/18   Referring Physician Antonio Zapata MD   Patient/Family Goals Statement Return to home   Additional Occupational Profile Info/Pertinent History of Current Problem Martita Burden is a 63 yo female with PMHx s/f HTN and hard palate low-grade clear " cell epithelial malignancy POD#1 s/p right maxillectomy, right neck exploration, left forearm free flap, split thickness skin graft, and nasogastric feeding tube placement on 9/20/2018    Precautions/Limitations no known precautions/limitations   Weight-Bearing Status - LUE nonweight-bearing   General Observations Pt pleasant and agreeable; extubated to RA this AM; ng tube, doppler, wound vac and nadia in L forearm; a-line, lanza   General Info Comments Activity: up with assist   Cognitive Status Examination   Orientation orientation to person, place and time   Level of Consciousness alert   Able to Follow Commands WNL/WFL   Personal Safety (Cognitive) WNL/WFL   Memory intact   Cognitive Comment no acute cognitive concerns noted   Visual Perception   Visual Perception Wears glasses   Visual Perception Comments no acute visual changes reported.  pt wears glasses for reading at baseline   Sensory Examination   Sensory Comments intact to light touch on all extremities   Pain Assessment   Patient Currently in Pain No   Range of Motion (ROM)   ROM Comment RUE WNL; BLE WNL;  L shoulder/elbow WFL; L wrist spinted   Strength   Strength Comments grossly 5/5 in BLE: BUE not formally assessed; pt demonstrates good functional strength in BUE; pt does report feeling slightly below baseline   Mobility   Bed Mobility Bed mobility skill: Supine to sit   Bed Mobility Skill: Supine to Sit   Level of [place]: Supine/Sit stand-by assist  (HOB elevated)   Physical Assist/Nonphysical Assist: Supine/Sit verbal cues   Transfer Skill: Bed to Chair/Chair to Bed   Level of [place]: Bed to Chair contact guard   Physical Assist/Nonphysical Assist: Bed to Chair 1 person + 1 person to manage equipment   Transfer Skill: Sit to Stand   Level of [place]: Sit/Stand stand-by assist   Physical Assist/Nonphysical Assist: Sit/Stand 1 person + 1 person to manage equipment   Balance   Balance Comments Pt c/o dizziness in sitting; unchanged in  standing; no LOB with bedside activity;  VSS   Lower Body Dressing   Level of [place]: Dress Lower Body moderate assist (50% patients effort)   Grooming   Level of [place]: Grooming moderate assist (50% patients effort)   Instrumental Activities of Daily Living (IADL)   Previous Responsibilities meal prep;housekeeping;laundry;shopping;driving;work   Activities of Daily Living Analysis   Impairments Contributing to Impaired Activities of Daily Living post surgical precautions;ROM decreased;strength decreased   General Therapy Interventions   Planned Therapy Interventions ADL retraining;IADL retraining;bed mobility training;ROM;strengthening;transfer training   Clinical Impression   Criteria for Skilled Therapeutic Interventions Met yes, treatment indicated   OT Diagnosis decreased activity tolerance and independence with ADLs   Influenced by the following impairments post surgical precautions; LUE NWB; acute medical needs; dizziness, fatigue   Assessment of Occupational Performance 5 or more Performance Deficits   Identified Performance Deficits bed mobility, transfers, toileting, dressing, bathing, mobility; home management; work   Clinical Decision Making (Complexity) Low complexity   Therapy Frequency 5 times/wk   Predicted Duration of Therapy Intervention (days/wks) 9/27/18   Anticipated Discharge Disposition Home with Assist   Risks and Benefits of Treatment have been explained. Yes   Patient, Family & other staff in agreement with plan of care Yes   Total Evaluation Time   Total Evaluation Time (Minutes) 5

## 2018-09-21 NOTE — PROGRESS NOTES
CLINICAL NUTRITION SERVICES - ASSESSMENT NOTE     Nutrition Prescription    RECOMMENDATIONS FOR MDs/PROVIDERS TO ORDER:  -Free water flush adjustment per MD discretion pending sodium and fluid status    Malnutrition Status:    Non-severe malnutrition in the context of acute illness    Recommendations already ordered by Registered Dietitian (RD):  1. Start TF via NGT:  -Isosource 1.5 @ 15 mL/hr  -If pt tolerates, adv TF by 10 mL q8h to goal 40 ml/hr (960 ml/day) to provide 1440 kcals (22+ kcal/kg/day), 65 g PRO (1+ g/kg/day), 730 ml free H2O, 169 g CHO and 14 g Fiber daily.  -1 packet Prosource TID to provide an additional 120 kcal and 33 g PRO to increase total provisions to 1560 kcal (24 kcal/kg) and 98 g PRO (1.5 g/kg)  -Order multivitamin/mineral (15 ml/day via FT) to help ensure micronutrient needs being met with suspected hypermetabolic demands and potential interruptions to TF infusions.  -30 mL water flush q4h for tube patency   -Assess gastric residuals and HOB >30 degrees while feeding stomach    Future/Additional Recommendations:  1. TF start, adv, lytes    2. Once tolerating continuous goal TF for at least 8 hours and approp to transition to [place] Bolus regimen, recommend do so as follows: begin first bolus with 0.5 cans (125 ml) and if tolerates after approx 4 hrs without GI complaints and/or residuals < 500 ml (if able to accurately return with smaller bore FT), rec adv each subsequent bolus by 0.5 cans (125 ml) every ~4 hrs until reach goal regimen of 1 cans (250 ml) QID = 4 cans daily (1000 ml/day) = 1500 kcals (23+ kcal/kg), 68 g PRO (1+ g/kg), 760 ml H2O, 176 g CHO and 15 g Fiber daily.  -Continue 1 packet Prosource TID to provide an additional 120 kcal and 33 g PRO to increase total provisions to 1620 kcal (25 kcal/kg) and 101 g PRO (1.5 g/kg)  *Change H2O flushes to 140 ml H2O before and after each bolus (addtl 840 ml H2O) to meet est fld needs.        REASON FOR ASSESSMENT  Martita Burden is  "a/an 62 year old female assessed by the dietitian for Provider Order - Registered Dietitian to Assess and Order TF per Medical Nutrition Therapy Protocol    NUTRITION HISTORY  Pt intubated at the time of interview but was able to give a thumbs up to eating well PTA.    I explained to her that we will likely start TFs via NGT (placed by ENT surgeons) today.    CURRENT NUTRITION ORDERS  Diet: NPO  Intake/Tolerance: N/A    LABS  9/21:  Phos = 2.3 (L)    MEDICATIONS  Medications reviewed    ANTHROPOMETRICS  Height: 165 cm (5' 4.961\")  Most Recent Weight: 98.7 kg (217 lb 9.5 oz)    IBW: 56.8 kg  BMI: 36; Obesity Grade II BMI 35-39.9  Weight History:   Wt Readings from Last 10 Encounters:   09/20/18 98.7 kg (217 lb 9.5 oz)   09/17/18 95.9 kg (211 lb 8 oz)   09/17/18 95.7 kg (211 lb)   09/05/18 95.3 kg (210 lb)   No significant weight loss noted in the recent past  Dosing Weight: 66 kg (adjusted based on lowest admit wt of 95.5 kg, IBW = 56.8 kg)    ASSESSED NUTRITION NEEDS  Estimated Energy Needs: 5456-8962 kcals/day (20 - 25 kcals/kg)  Justification: Obese and Post-op  Estimated Protein Needs: 79-99+ grams protein/day (1.2 - 1.5+ grams of pro/kg)  Justification: Increased needs and Post-op  Estimated Fluid Needs: (1 mL/kcal)   Justification: Per provider pending fluid status    PHYSICAL FINDINGS  See malnutrition section below.  Poor skin turgor     MALNUTRITION  % Intake: No decreased intake noted  % Weight Loss: None noted  Subcutaneous Fat Loss: None observed  Muscle Loss: Thoracic region (clavicle, acromium bone, deltoid, trapezius, pectoral), Upper arm (bicep, tricep), Lower arm  (forearm), Upper leg (quadricep, hamstring) and Posterior calf:  Mild  Fluid Accumulation/Edema: Mild-Moderate  Malnutrition Diagnosis: Non-severe malnutrition in the context of acute illness    NUTRITION DIAGNOSIS  Inadequate protein-energy intake related to s/p ENT surgery as evidenced by NPO with EN yet to start  "     INTERVENTIONS  Implementation  Nutrition education for nutrition relationship to health/disease, tentative nutrition plan, RD role to pt  Collaboration and Referral of Nutrition care - Discussed plan for FEN/GI on rounds with Providers  Enteral Nutrition - Initiate  Feeding tube flush  Multivitamin/mineral supplement therapy     Goals  Total avg nutritional intake to meet a minimum of 20 kcal/kg and 1.2 g PRO/kg daily (per dosing wt 66 kg).     Monitoring/Evaluation  Progress toward goals will be monitored and evaluated per protocol.    Emma Carias, RD, LD  SICU RD Pgr: [phone]

## 2018-09-21 NOTE — PROGRESS NOTES
"ENT Free Flap Check  September 21, 2018    S: No issues per nursing with the flap. Patient is still intubated has restraints removed is alert and following commands. She has no acute concerns other than she would like to be repositioned in bed. MAPs have all been above 70 since last flap check per chart review.    O: /87  Temp 100.6  F (38.1  C) (Axillary)  Resp 16  Ht 1.65 m (5' 4.96\")  Wt 98.7 kg (217 lb 9.5 oz)  SpO2 100%  BMI 36.25 kg/m2  General: alert, following commands, intubated, mouthing words around tube.  HEENT: Flap appears viable, warm, soft, without evidence of congestion. Strong implantable doppler signal. Incisions lines clean, intact. Neck is soft and flat without signs of hematoma.  Extremities: Left extremity dressing is in place. Fingers are warm and well perfused. Patient is able to move them to command.    A/P: Martita Burden is a 62 year old female, POD #1 s/p Right Maxillectomy, Right Neck Exploration, Left Forearm Free Flap, Split Thickness Skin Graft from Lower Extremity  -- Stable flap, continue current plan of care      Edward Stephenson  PGY-2  Otolaryngology-Head & Neck Surgery  To contact ENT please dial * * [phone] and enter job code [phone].    "

## 2018-09-21 NOTE — OP NOTE
Procedure Date: 09/21/2018      ATTENDING SURGEON:  Terrence Gutierrez MD      OTHER SURGEON:  Leslie Garza MD      ASSISTANT:  Shira Lou MD; Tri Egan MD; and Antonio Zapata MD.      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      PROCEDURES:   1.  Left-sided radial forearm free tissue transfer.   2.  Split thickness skin graft in the left thigh and the left arm.   3.  Placement of nasogastric feeding tube.   4.  Splinting and VAC placement of the left forearm.      ANESTHESIA:  General.      OPERATIVE INDICATIONS:  Ms. Burden is a patient who was diagnosed with squamous cell carcinoma of the right palate.  She did not desire a prosthetic denture and also could not get insurance coverage for this and elected to have free tissue reconstruction for her defect.      OPERATIVE FINDINGS:   1.  The right facial artery was anastomosed to the right radial artery using 9-0 nylon suture and a 3 mm  was used to anastomose the single vena comitans to the right common facial vein.   2.  Flap ischemia time was approximately 2 hours.   3.  Tourniquet time was about 50 minutes.      OPERATIVE PROCEDURE:  Once Dr. Garza had outlined the nature of the defect, I created a template to place some skin in the floor of the maxillary sinus and nasal floor, as well as to the reline the palate.  We then laid this template onto the left forearm centered on the radial artery.  We then raised the tourniquet to 250 mmHg and incised medially and got onto the brachioradialis tendon.  I should note that she had an absent palmaris tendon.  We traced this up into the proximal forearm and clipped all the perforating branches.  We then made our lateral incision and got onto the brachioradialis tendon.  I should note that her cephalic vein, while we did initially preserve it, appeared at a segment that was not containing  any flow before reconstituting closer to the antecubital fossa.  Therefore, this did not appear to be a viable option for draining the flap.  We then preserved the branches of the superficial branch of the radial nerve and ligated the pedicle distally.  We then lifted the flap out of the forearm and off of the flexor digitorum superficialis.  We carried it to the antecubital fossa where the tourniquet was then let down for reperfusion, and then we clipped the radial artery and the single vein which had combined both veins to drain the entire deep system of the flap.  We then closed the arm using 3-0 Vicryl and 4-0 nylon in the proximal forearm with a drain placed proximally.  A split thickness skin graft was taken from the left thigh with the dermatome set at one-fifteen-thousandth of an inch.  The skin graft donor site was dressed with alginate and Tegaderm.  Skin graft was then sewn into place and pie crusted.  We placed a splint on the arm and also a VAC dressing.  The flap was then taken to the palate, and the segment between the sinus floor and the palate surface was de-epithelialized to allow sewing into the medial margin of the resection which was the hard palate.  A series of 3-0 Vicryl sutures were used to inset the flap to reconstruct the soft and hard palate, but the bulk of the defect was hard palate.  I should note that the anterior alveolus was left in place so this allows her to have adequate rehabilitation with the denture.  Next, we brought the pedicle into the neck after creating a tunnel and dilating it with Hegar dilators.  A microscope was then brought in, and we performed microvascular anastomosis between the vessels.  The facial artery was anastomosed to the radial artery using 9-0 nylon suture, and the facial vein was anastomosed to the common facial vein using a 3 mm .  We then released the clamps and flow was excellent and the flap had good perfusion.  We then placed a [employer] Doppler  and closed the right neck incision using 3-0 Vicryl and 4-0 nylon, placed a small Penrose drain.  The facial incisions were closed using 3-0 Vicryl and 5-0 nylon.  The lip was closed using 4-0 Vicryl and 5-0 chromic.  The nasogastric feeding tube was sutured to the septum using a 2-0 silk stitch.  The patient was then taken to the ICU in stable condition.      BLOOD LOSS:  For my portion of the procedure was about 100 mL.      All sponge, instrument, and needle counts were correct.         JENELLE PASTOR MD             D: 2018   T: 2018   MT: ANTHONY      Name:     LUCA HOLDER   MRN:      [id number]        Account:        [id number]   :      1955           Procedure Date: 2018      Document: [id number]

## 2018-09-21 NOTE — PROGRESS NOTES
"ENT Free Flap Check  September 21, 2018  2:24 PM    S: No issues with the flap per RN. She had a very mild oozing nosebleed earlier which has slowed down. She is no longer swallowing blood. She had some nausea, no vomiting, when TF were started earlier today, these were held. Zofran given with good effect.     O: /87  Temp 99.7  F (37.6  C) (Axillary)  Resp 16  Ht 1.65 m (5' 4.96\")  Wt 98.7 kg (217 lb 9.5 oz)  SpO2 95%  BMI 36.25 kg/m2  General: Awake, alert, AND  HEENT: Flap appears viable, pale pink, soft, without evidence of congestion. Strong implantable doppler signal. Incisions lines clean, dry, intact. Neck is soft and flat without signs of hematoma.  Right nare with some blood tinged secretions.   Pulmonary: breathing comfortably.   Extremities: left arm with intact distal motor function sensation and good capillary refill    A/P: Martita Burden is a 62 year old female, POD #1   -- Do not use Afrin or any vasoconstricting spray for nosebleed. It will slow down on its own.   -- Remove Sommers today.   -- flap appears viable  -- continue current plan of care     Tri Stafford MD  PGY3 [facility]    "

## 2018-09-21 NOTE — PROGRESS NOTES
Pt extubated at 0934 to a 4L oxymask. Pt has clear breath sounds and has a strong, nonproductive cough.

## 2018-09-21 NOTE — PROGRESS NOTES
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 gomes and Matt.    TODAY'S PROGRESS/PLANS:   - wean propofol,   - PST, likely extubation this AM  - Tube feeds to start through NG, continue IVF  - Pull A line, Lanza today  - Discuss with ENT plans for flap checks   - PT/OT consult  - Nutrition consult    PLAN:   Neuro/ pain/ sedation:  - Monitor neurological status. Notify the MD for any acute changes in exam.  - oxycodone PRN for pain  - Gabapentin, tylenol scheduledd     Pulmonary care:   #remained intubated post-op 9/20  - Extubated on nasal cannula  - Supplemental oxygen to keep saturation above 92 %      Cardiovascular:   #HTN:    - Remove arterial line  - Monitor hemodynamic status.   - No pressor requirements; page ENT if pressors mandatory  - Continue PTA lisinopril-HCTZ, simvastatin  - ASA, heparin per ENT     GI care:   - NG placed intraoperatively  - Start trickle tube feeding  - Senna BID      Fluids/ Electrolytes/ Nutrition:   - LR at 100ml/hr for IV fluid hydration -Will consider stopping the IVF once she resumes PO/NG  - qAM BMP  - Electrolyte replacement protocol     Renal/ Fluid Balance:    - Urine output is adequate so far. Remove the lanza  - Will continue to monitor intake and output.      Endocrine:    -No management indication.      ID/ Antibiotics:  #Perioperative prophylaxis:  - Ciprofloxacin, flagyl through 9/22 per ENT      Heme:     - Hemoglobin stable.  - qAM CBC      MSK:  - OT/PT when able  - Flap checks per ENT recs      Prophylaxis:    -SCDs for mechanical prophylaxis for DVT.  -Heparin 5000u q8hrs      Lines/ tubes/ drains:  - PIV x2,  BRYAN, wound vac,  penrose      Disposition:  -Surgical ICU    ====================================  SUBJECTIVE:   Nil acute events overnight     OBJECTIVE:   1. VITAL SIGNS:   Temp:  [98.4  F (36.9  C)-100.6  F (38.1  C)] 99.7  F (37.6  C)  Heart Rate:  [] 68  Resp:  [16] 16  MAP:  [59 mmHg-112 mmHg] 64 mmHg  Arterial Line BP: ()/(10-77) 97/48  FiO2 (%):  [30 %-100 %] 30 %  SpO2:  [97 %-100 %] 99 %  Ventilation Mode: CMV/AC  (Continuous Mandatory Ventilation/ Assist Control)  FiO2 (%): 30 %  Rate Set (breaths/minute): 16 breaths/min  Tidal Volume Set (mL): 500 mL  PEEP (cm H2O): 5 cmH2O  Oxygen Concentration (%): 30 %  Resp: 16    2. INTAKE/ OUTPUT:   I/O last 3 completed shifts:  In: 6040.11 [I.V.:4610.11; NG/GT:180]  Out: 3167 [Urine:2355; Emesis/NG output:300; Drains:12; Blood:500]    3. PHYSICAL EXAMINATION:   General: NAD,afebrile  HEENT/Neck: Normocephalic, right-sided neck incision with penrose drain  Respiratory: Lung sounds clear to auscultation b/l with vent sounds  Cardiovascular: Regular rate and rhythm.  Gastrointestinal: Abdomen soft, non-distended, non-tender to palpation. No organomegaly or masses appreciated.  Extremities: No limb deformities. No pedal edema. Peripheral pulses present. Left forearm wrapped in kerlex with wound vac line coming through, BRYAN with minimal serosanguinous output exiting just distal to left antecubital fossa  Skin: As noted above. No rashes or lesions appreciated.     4. INVESTIGATIONS:   Arterial Blood Gases     Recent Labs  Lab 09/21/18  0256 09/20/18  1743 09/20/18  1410 09/20/18  1118   PH 7.47* 7.32* 7.37 7.38   PCO2 34* 43 41 42   PO2 145* 159* 72* 72*   HCO3 25 22 23 25     Complete Blood Count     Recent Labs  Lab 09/21/18  0256 09/20/18  1743 09/20/18  1410 09/20/18  1118  09/17/18  1216   WBC 10.1 15.5*  --   --   --  5.3   HGB 8.8* 9.9* 9.7* 10.2*  < > 14.3    245  --   --   --  299   < > = values in this interval not displayed.  Basic Metabolic Panel    Recent  Labs  Lab 09/21/18  0256 09/20/18  1743 09/20/18  1410 09/20/18  1118  09/17/18  1216    139 139 138  < > 138   POTASSIUM 3.9 3.6 3.8 4.1  < > 4.2   CHLORIDE 107 107  --   --   --  106   CO2 23 23  --   --   --  24   BUN 12 15  --   --   --  17   CR 0.65 0.65  --   --   --  0.83   * 182* 127* 122*  < > 95   < > = values in this interval not displayed.  Liver Function Tests    Recent Labs  Lab 09/21/18  0256 09/20/18  1743 09/17/18  1216   ALBUMIN 3.2*  --   --    INR 1.17* 1.11 0.94     Pancreatic Enzymes  No lab results found in last 7 days.  Coagulation Profile    Recent Labs  Lab 09/21/18  0256 09/20/18  1743 09/17/18  1216   INR 1.17* 1.11 0.94     Lactate  Invalid input(s): LACTATE    5. RADIOLOGY:   Recent Results (from the past 24 hour(s))   XR Chest Port 1 View    Narrative    EXAM: XR CHEST PORT 1 VW  9/20/2018 5:27 PM      HISTORY: Endotracheal tube positioning;     COMPARISON: Outside radiograph 8/6/2018    FINDINGS: AP image of the chest. Enteric tube projects over the  stomach. Endotracheal tube tip projects over the midthoracic trachea.  The trachea is likely midline when accounting for the rotation of  projection. Cardiac silhouette is within normal limits. No pleural  effusion or pneumothorax. Asymmetric patchy airspace and interstitial  opacities in the left lung.       Impression    IMPRESSION:   1. Endotracheal tube tip projects over the midthoracic trachea.  2. Asymmetric airspace and interstitial opacities in the left lung,  suggesting infection or asymmetric pulmonary edema.    I have personally reviewed the examination and initial interpretation  and I agree with the findings.    MARY LAUREANO MD   XR Chest Port 1 View    Narrative    EXAM: XR CHEST PORT 1 VW  9/20/2018 5:53 PM      HISTORY: ETT placement;     COMPARISON: 9/20/2018 1710 hours    FINDINGS: AP image of the chest. Endotracheal tube tip projects over  the midthoracic trachea, 6.9 cm above the mt. Feeding tube  tip  projects over the stomach. The trachea is midline. Cardiomediastinal  silhouette is within normal limits. No pleural effusion or  pneumothorax. Asymmetric patchy airspace and interstitial opacities in  the left lung, stable.       Impression    IMPRESSION:   1. No change in exam compared to radiograph performed 20 minutes  prior. Endotracheal tube tip projects over the midthoracic trachea.  2. Unchanged asymmetric mixed interstitial and airspace opacities in  the left lung suggesting infection or asymmetric pulmonary edema.    I have personally reviewed the examination and initial interpretation  and I agree with the findings.    SUZAN JACK MD       =========================================      Patient seen, findings and plan discussed with surgical ICU staff Dr. Smith.    Jose Castellon, MS4  [employer]    Rosalind Daley  [facility] Anesthesia resident

## 2018-09-21 NOTE — PLAN OF CARE
Problem: Patient Care Overview  Goal: Plan of Care/Patient Progress Review  Outcome: Improving    Neuro: Pt sedated on propofol/fentanyl drips overnight, arouses to voice easily, follows commands. PERRL. ANDERSON x4. Limited mobility in Lt wrist, splinted.   CV: HR NSR 70-80s, rare PAC noted. MAP goal >65, no issues overnight. SBP 90-110s. Tmax 100.6 axillary. + pulses.  Pulm: LS coarse, diminished in bases. On CMV, 30% fiO2, rate 16, , PEEP 5, sating %. Plan to do PST this AM. ETT @ 22cm, sutured to lip. Suctioning small amounts of secretions from ETT, clear to blood tinged, thin.   GI: NPO except for meds. NG sutured in place, clamped. Hypoactive BS, getting scheduled bowel meds. .   : Sommers in place with good UOP, see flowsheets.   Skin: Q1hr flap checks to Rt upper mouth, soft, warm, pale pink. Good pulse from implanted doppler overnight. ENT resident Dr. Stephenson saw pt x2 overnight, no concerns, see notes. Sutures from Rt nare to lip, and Rt neck sutures ANTHONY, bacitracin applied. Penrose drain x1 to Rt neck, oozing frequently, gauze changed frequently. LUE forearm free flap site splinted, ace wrapped. WV @ 125 mmHg continuous suction and BRYAN x1 to LUE. LLE with STSG site to Lt thigh, covered with calcium alginate/tegaderm, CDI.  Access: PIV x2 in RUE, and PIV x1 in Lt foot. Rt radial art line.   Drips: LR @ 100 ml/hr and Propofol @ 25 mcg/kg/hr. Fentanyl D/C'd.   PRN: Dilaudid IV bumps for pain.     Plan: PST this AM, wean to extubate. Continue Q1hr flap monitoring, notify ENT with any concerns.

## 2018-09-21 NOTE — PLAN OF CARE
Problem: Patient Care Overview  Goal: Plan of Care/Patient Progress Review  Outcome: No Change  D: POD O s/p Right Maxillectomy, Right Neck Exploration, Left Forearm Free Flap, Split Thickness Skin Graft from Lower Extremity, Nasogastric Feeding Tube Placement. On 4A for Q1h flap checks, monitoring, overnight intubation.  I/A:  Neuro: Sedated on 20mcg/kg/min Propofol & 50mcg/hour Fentanyl. When sedation on hold, opened eyes, followed some simple commands. PERRL 3x3, moves all extremities.  Pulm: #7 ETT @22cm lip, on CMV RR 16  PEEP 7 FiO2 40%. Scant, clear secretions from ETT & orally. Lungs coarse in BUL, diminished bases.  CV: HR 80s NSR. BP now 115s/50s. Was briefly hypotensive upon arrival, received 250cc 5% Albumin, resolved.  PV: 2+ radial and DP pulses, mildly edematous throughout. Face swollen from surgery.  GI: Soft abdomen, no bowel tones (just arrived from OR), NG in place, to LIS w/100cc brown-bilious output.  : Sommers in place, 225cc UOP since arrival.  Skin: Multiple incisions, see flowsheets for details.  MS: [place] d/t patient condition.  Psych/Social: Quin De Jesus is local, updated at bedside, will update patient's family.  Lines: R FA PIV, R hand PIV, L foot PIV, R radial arterial line.  Drips: LR@100cc/hour, Propofol @ 20mcg/kg/min, Fentanyl @50mcg/hour.  Plan: Continue to monitor w/Q1h nursing flap checks and Q4h resident flap checks, notify ENT and/or SICU of any concerns or changes.

## 2018-09-21 NOTE — PROGRESS NOTES
"Otolaryngology Progress Note  September 21, 2018    S: No acute events overnight. Flap checks stable. Received 250 mL of albumin postop for MAP 59, responded well and MAPs otherwise in 60-70s.     O: /87  Temp 99.7  F (37.6  C) (Axillary)  Resp 16  Ht 1.65 m (5' 4.96\")  Wt 98.7 kg (217 lb 9.5 oz)  SpO2 100%  BMI 36.25 kg/m2   General: Sedated but interactive   HEENT: Oral flap soft, warm, pale with strong implantable doppler signal. No signs of flap congestion or dehiscenence. Lateral rhinotomy incision clean, dry, intact. Neck incision clean, dry, intact. Neck soft and flat without evidence of hematoma or fluid collection. Penrose in place. NG in place.   Pulmonary: Mechanically ventilated via ETT sutured at the lip and RMT   Extremities: Left arm dressed with splint. Wound vac holding suction. BRYAN drains x 1 in place and holding suction with serosanguinous drainage in bulbs.       Intake/Output Summary (Last 24 hours) at 09/21/18 1034  Last data filed at 09/21/18 1000   Gross per 24 hour   Intake          4254.44 ml   Output             3772 ml   Net           482.44 ml     BRYAN drain output(s): (last 24 hours)/(last shift)  Left arm: 2/10/-    LABS:  ROUTINE IP LABS (Last four results)  BMP  Recent Labs  Lab 09/21/18  0256 09/20/18  1743 09/20/18  1410 09/20/18  1118  09/17/18  1216    139 139 138  < > 138   POTASSIUM 3.9 3.6 3.8 4.1  < > 4.2   CHLORIDE 107 107  --   --   --  106   SAUNDRA 8.2* 8.1*  --   --   --  9.4   CO2 23 23  --   --   --  24   BUN 12 15  --   --   --  17   CR 0.65 0.65  --   --   --  0.83   * 182* 127* 122*  < > 95   < > = values in this interval not displayed.  CBC  Recent Labs  Lab 09/21/18  0256 09/20/18  1743 09/20/18  1410 09/20/18  1118  09/17/18  1216   WBC 10.1 15.5*  --   --   --  5.3   RBC 2.71* 3.05*  --   --   --  4.49   HGB 8.8* 9.9* 9.7* 10.2*  < > 14.3   HCT 26.3* 29.8*  --   --   --  44.5   MCV 97 98  --   --   --  99   MCH 32.5 32.5  --   --   --  31.8 "   MCHC 33.5 33.2  --   --   --  32.1   RDW 14.0 13.7  --   --   --  13.7    245  --   --   --  299   < > = values in this interval not displayed.  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 She is POD#1 from right inferior maxillectomy with left radial forearm free flap reconstruction and split thickness skin graft.     Neuro:  - Pain/sedation per SICU. Wean sedation off today    HEENT:  - Nothing around the neck (no gown ties, trach ties, lines, ect.)  - RN flap checks Q1H x 48h, Q2H x 24h, then Q4H  - ENT flap checks Q4H --> Q6H tonight  - Clean incisions with 0.9% sodium chloride and apply bacitracin Q8H x 24h, then transition to Aquaphor  - Monitor and record BRYAN drain output Qshift  - Peridex oral rinses 4 times daily  - Saline oral rinses Q8H and PRN. Gentle suction with red jade catheter only (no yankeur), do not cut red jade  - Nasal sling PRN for nasal drainage  - Left radial forearm: Wound vac and plaster splint, BRYAN x 1. Weightbearing as tolerated, keep wrist in neutral position. Wound vac down and dressing change on POD5.  - STSG: Calcium alginate/tegarderm dressing. Replace/reinforce PRN while wound is draining. Once wound is no longer draining the wound can be left open to air, applying Aquaphor to keep moist.    Respiratory:  - Mechanically ventilated via trach, wean to trach dome today    CV/heme:  - NO vasopressors  - MAP goal >60, please contact ENT before giving any fluid boluses, recommend albumin or blood before giving any crystalloids  - hemodynamically stable, hgb 14.3 > 9.9 > 8.8  -  mg and SQ heparin for flap  - Hx of HTN: resume home lisinopril-HCTZ, simvastatin    FEN/GI:  - NPO.  - Nutrition consult. May start tube feeding via nasogastric feeding tube with evidence  of bowel sounds  - Bowel regimen: Senna    :  - Lanza in place with adequate UOP. Discontinue lanza today    Endo  - No active issues    ID:  - Perioperative antibiotics (Cipro/flagyl due to penicillin allergy) x 48h post-op    PPX:  - Protonix  - SQ heparin 5,000 units Q8H  - SCDs    Dispo: SICU x 72 hours for flap monitoring      -- Patient and above plan to be discussed with Dr. Garza and Dr. Matt Baker MD  Otolaryngology-Head & Neck Surgery PGY-1  Please contact ENT with questions by dialing [phone] and entering job code [phone] when prompted.

## 2018-09-22 ENCOUNTER — APPOINTMENT (OUTPATIENT)
Dept: OCCUPATIONAL THERAPY | Facility: CLINIC | Age: 63
End: 2018-09-22
Attending: OTOLARYNGOLOGY
Payer: COMMERCIAL

## 2018-09-22 LAB
ANION GAP SERPL CALCULATED.3IONS-SCNC: 9 MMOL/L (ref 3–14)
BUN SERPL-MCNC: 8 MG/DL (ref 7–30)
CALCIUM SERPL-MCNC: 8.9 MG/DL (ref 8.5–10.1)
CHLORIDE SERPL-SCNC: 106 MMOL/L (ref 94–109)
CO2 SERPL-SCNC: 25 MMOL/L (ref 20–32)
CREAT SERPL-MCNC: 0.61 MG/DL (ref 0.52–1.04)
ERYTHROCYTE [DISTWIDTH] IN BLOOD BY AUTOMATED COUNT: 14.2 % (ref 10–15)
GFR SERPL CREATININE-BSD FRML MDRD: >90 ML/MIN/1.7M2
GLUCOSE SERPL-MCNC: 111 MG/DL (ref 70–99)
HCT VFR BLD AUTO: 30 % (ref 35–47)
HGB BLD-MCNC: 9.6 G/DL (ref 11.7–15.7)
INR PPP: 1.1 (ref 0.86–1.14)
MAGNESIUM SERPL-MCNC: 1.9 MG/DL (ref 1.6–2.3)
MCH RBC QN AUTO: 32 PG (ref 26.5–33)
MCHC RBC AUTO-ENTMCNC: 32 G/DL (ref 31.5–36.5)
MCV RBC AUTO: 100 FL (ref 78–100)
PHOSPHATE SERPL-MCNC: 2.3 MG/DL (ref 2.5–4.5)
PLATELET # BLD AUTO: 242 10E9/L (ref 150–450)
POTASSIUM SERPL-SCNC: 3.3 MMOL/L (ref 3.4–5.3)
POTASSIUM SERPL-SCNC: 4.3 MMOL/L (ref 3.4–5.3)
RBC # BLD AUTO: 3 10E12/L (ref 3.8–5.2)
SODIUM SERPL-SCNC: 140 MMOL/L (ref 133–144)
WBC # BLD AUTO: 9.2 10E9/L (ref 4–11)

## 2018-09-22 PROCEDURE — 85027 COMPLETE CBC AUTOMATED: CPT | Performed by: ANESTHESIOLOGY

## 2018-09-22 PROCEDURE — 36415 COLL VENOUS BLD VENIPUNCTURE: CPT | Performed by: ANESTHESIOLOGY

## 2018-09-22 PROCEDURE — 83735 ASSAY OF MAGNESIUM: CPT | Performed by: ANESTHESIOLOGY

## 2018-09-22 PROCEDURE — 25000132 ZZH RX MED GY IP 250 OP 250 PS 637: Performed by: STUDENT IN AN ORGANIZED HEALTH CARE EDUCATION/TRAINING PROGRAM

## 2018-09-22 PROCEDURE — 25000132 ZZH RX MED GY IP 250 OP 250 PS 637: Performed by: PHYSICIAN ASSISTANT

## 2018-09-22 PROCEDURE — 97530 THERAPEUTIC ACTIVITIES: CPT | Mod: GO | Performed by: OCCUPATIONAL THERAPIST

## 2018-09-22 PROCEDURE — 40000133 ZZH STATISTIC OT WARD VISIT: Performed by: OCCUPATIONAL THERAPIST

## 2018-09-22 PROCEDURE — 25000125 ZZHC RX 250: Performed by: STUDENT IN AN ORGANIZED HEALTH CARE EDUCATION/TRAINING PROGRAM

## 2018-09-22 PROCEDURE — 20000004 ZZH R&B ICU UMMC

## 2018-09-22 PROCEDURE — 27210429 ZZH NUTRITION PRODUCT INTERMEDIATE LITER

## 2018-09-22 PROCEDURE — 25000128 H RX IP 250 OP 636: Performed by: STUDENT IN AN ORGANIZED HEALTH CARE EDUCATION/TRAINING PROGRAM

## 2018-09-22 PROCEDURE — 85610 PROTHROMBIN TIME: CPT | Performed by: ANESTHESIOLOGY

## 2018-09-22 PROCEDURE — E2402 NEG PRESS WOUND THERAPY PUMP: HCPCS

## 2018-09-22 PROCEDURE — 84100 ASSAY OF PHOSPHORUS: CPT | Performed by: ANESTHESIOLOGY

## 2018-09-22 PROCEDURE — 84132 ASSAY OF SERUM POTASSIUM: CPT | Performed by: ANESTHESIOLOGY

## 2018-09-22 PROCEDURE — 80048 BASIC METABOLIC PNL TOTAL CA: CPT | Performed by: ANESTHESIOLOGY

## 2018-09-22 PROCEDURE — 97535 SELF CARE MNGMENT TRAINING: CPT | Mod: GO | Performed by: OCCUPATIONAL THERAPIST

## 2018-09-22 PROCEDURE — 99291 CRITICAL CARE FIRST HOUR: CPT | Performed by: PHYSICIAN ASSISTANT

## 2018-09-22 RX ORDER — CAFFEINE 200 MG
200 TABLET ORAL DAILY PRN
Status: DISCONTINUED | OUTPATIENT
Start: 2018-09-22 | End: 2018-09-26 | Stop reason: HOSPADM

## 2018-09-22 RX ORDER — POLYETHYLENE GLYCOL 3350 17 G/17G
17 POWDER, FOR SOLUTION ORAL DAILY
Status: DISCONTINUED | OUTPATIENT
Start: 2018-09-22 | End: 2018-09-26 | Stop reason: HOSPADM

## 2018-09-22 RX ADMIN — Medication 1 PACKET: at 19:54

## 2018-09-22 RX ADMIN — WHITE PETROLATUM: 1.75 OINTMENT TOPICAL at 15:58

## 2018-09-22 RX ADMIN — CHLORHEXIDINE GLUCONATE 0.12% ORAL RINSE 15 ML: 1.2 LIQUID ORAL at 23:32

## 2018-09-22 RX ADMIN — CHLORHEXIDINE GLUCONATE 0.12% ORAL RINSE 15 ML: 1.2 LIQUID ORAL at 15:58

## 2018-09-22 RX ADMIN — CIPROFLOXACIN 400 MG: 2 INJECTION, SOLUTION INTRAVENOUS at 13:59

## 2018-09-22 RX ADMIN — Medication 1 PACKET: at 14:02

## 2018-09-22 RX ADMIN — METRONIDAZOLE 500 MG: 500 INJECTION, SOLUTION INTRAVENOUS at 15:57

## 2018-09-22 RX ADMIN — HEPARIN SODIUM 5000 UNITS: 5000 INJECTION, SOLUTION INTRAVENOUS; SUBCUTANEOUS at 11:16

## 2018-09-22 RX ADMIN — ACETAMINOPHEN 975 MG: 325 TABLET, FILM COATED ORAL at 00:58

## 2018-09-22 RX ADMIN — ACETAMINOPHEN 975 MG: 325 TABLET, FILM COATED ORAL at 12:15

## 2018-09-22 RX ADMIN — LISINOPRIL AND HYDROCHLOROTHIAZIDE 1 TABLET: 12.5; 1 TABLET ORAL at 08:06

## 2018-09-22 RX ADMIN — MULTIVITAMIN 15 ML: LIQUID ORAL at 08:06

## 2018-09-22 RX ADMIN — CHLORHEXIDINE GLUCONATE 0.12% ORAL RINSE 15 ML: 1.2 LIQUID ORAL at 00:47

## 2018-09-22 RX ADMIN — POTASSIUM & SODIUM PHOSPHATES POWDER PACK 280-160-250 MG 1 PACKET: 280-160-250 PACK at 23:31

## 2018-09-22 RX ADMIN — Medication 5 MG: at 21:28

## 2018-09-22 RX ADMIN — CIPROFLOXACIN 400 MG: 2 INJECTION, SOLUTION INTRAVENOUS at 01:04

## 2018-09-22 RX ADMIN — POTASSIUM & SODIUM PHOSPHATES POWDER PACK 280-160-250 MG 1 PACKET: 280-160-250 PACK at 11:03

## 2018-09-22 RX ADMIN — SODIUM CHLORIDE, POTASSIUM CHLORIDE, SODIUM LACTATE AND CALCIUM CHLORIDE: 600; 310; 30; 20 INJECTION, SOLUTION INTRAVENOUS at 15:57

## 2018-09-22 RX ADMIN — METRONIDAZOLE 500 MG: 500 INJECTION, SOLUTION INTRAVENOUS at 11:01

## 2018-09-22 RX ADMIN — METRONIDAZOLE 500 MG: 500 INJECTION, SOLUTION INTRAVENOUS at 04:36

## 2018-09-22 RX ADMIN — WHITE PETROLATUM: 1.75 OINTMENT TOPICAL at 23:32

## 2018-09-22 RX ADMIN — SIMVASTATIN 40 MG: 20 TABLET, FILM COATED ORAL at 19:54

## 2018-09-22 RX ADMIN — POLYETHYLENE GLYCOL 3350 17 G: 17 POWDER, FOR SOLUTION ORAL at 11:10

## 2018-09-22 RX ADMIN — SENNOSIDES AND DOCUSATE SODIUM 2 TABLET: 8.6; 5 TABLET ORAL at 19:54

## 2018-09-22 RX ADMIN — POTASSIUM & SODIUM PHOSPHATES POWDER PACK 280-160-250 MG 1 PACKET: 280-160-250 PACK at 15:58

## 2018-09-22 RX ADMIN — HEPARIN SODIUM 5000 UNITS: 5000 INJECTION, SOLUTION INTRAVENOUS; SUBCUTANEOUS at 18:25

## 2018-09-22 RX ADMIN — ASPIRIN 325 MG ORAL TABLET 325 MG: 325 PILL ORAL at 08:06

## 2018-09-22 RX ADMIN — Medication 2 G: at 10:46

## 2018-09-22 RX ADMIN — SENNOSIDES AND DOCUSATE SODIUM 2 TABLET: 8.6; 5 TABLET ORAL at 08:06

## 2018-09-22 RX ADMIN — Medication 1 PACKET: at 11:03

## 2018-09-22 RX ADMIN — WHITE PETROLATUM: 1.75 OINTMENT TOPICAL at 08:16

## 2018-09-22 RX ADMIN — WHITE PETROLATUM: 1.75 OINTMENT TOPICAL at 00:48

## 2018-09-22 RX ADMIN — ONDANSETRON 4 MG: 2 INJECTION INTRAMUSCULAR; INTRAVENOUS at 10:51

## 2018-09-22 RX ADMIN — HEPARIN SODIUM 5000 UNITS: 5000 INJECTION, SOLUTION INTRAVENOUS; SUBCUTANEOUS at 01:04

## 2018-09-22 RX ADMIN — POTASSIUM & SODIUM PHOSPHATES POWDER PACK 280-160-250 MG 1 PACKET: 280-160-250 PACK at 19:54

## 2018-09-22 RX ADMIN — POTASSIUM CHLORIDE 20 MEQ: 1.5 POWDER, FOR SOLUTION ORAL at 07:09

## 2018-09-22 RX ADMIN — POTASSIUM PHOSPHATE, MONOBASIC AND POTASSIUM PHOSPHATE, DIBASIC 15 MMOL: 224; 236 INJECTION, SOLUTION INTRAVENOUS at 12:11

## 2018-09-22 RX ADMIN — ACETAMINOPHEN 975 MG: 325 TABLET, FILM COATED ORAL at 19:54

## 2018-09-22 RX ADMIN — CAFFEINE 200 MG: 200 TABLET ORAL at 08:06

## 2018-09-22 RX ADMIN — CHLORHEXIDINE GLUCONATE 0.12% ORAL RINSE 15 ML: 1.2 LIQUID ORAL at 08:06

## 2018-09-22 RX ADMIN — POTASSIUM CHLORIDE 20 MEQ: 1.5 POWDER, FOR SOLUTION ORAL at 11:21

## 2018-09-22 ASSESSMENT — ACTIVITIES OF DAILY LIVING (ADL)
ADLS_ACUITY_SCORE: 11

## 2018-09-22 NOTE — PLAN OF CARE
Problem: Patient Care Overview  Goal: Plan of Care/Patient Progress Review  Outcome: Improving  Neuro: denies pain, up with SBA to chair/commode, OOB all day, ambulated in halls 2x. Headache resolved with caffeine pill in AM.  CV: VSS. Slight edema.  Resp: RA. Clear LS. Using IS independently.  GI/: TF started at 10cc/hr today, now at 20cc/hr, tolerating well. Denies nausea. Passing gas. Hypoactive bowel sounds, addition bowel meds added to regimen. Voids on commode, adequate UO.  Skin: additional swelling noted on right neck, ENT aware and no concerns. Sutures missing from upper lip, MD assessed, okay. Flap WDL. CMS intact in LUE.    Plan: transfer out of ICU tomorrow. Continue q2h flap checks. Continue plan of care.

## 2018-09-22 NOTE — PROGRESS NOTES
"ENT Virtual Free Flap Check  September 22 , 2018      S: No issues with the flap per RN. Occasionally dabbing at nose with tissue.    O: /72  Temp 97.7  F (36.5  C) (Axillary)  Resp 26  Ht 1.65 m (5' 4.96\")  Wt 99.3 kg (218 lb 14.7 oz)  SpO2 96%  BMI 36.47 kg/m2  HEENT: Flap appears viable, pale pink, soft, without evidence of congestion. Strong implantable doppler signal. Incisions lines clean, dry, intact. Neck with generalized edema, soft, without signs of hematoma, scant output from penrose.  Right nare with some blood tinged secretions.        A/P: Martita Burden is a 62 year old female, POD #2     -- Stable flap  -- Do not use Afrin or any vasoconstricting spray for nosebleed. It will slow down on its own.   -- continue current plan of care    Meenakshi South MD  Otolaryngology- Head and Neck Surgery PGY4         "

## 2018-09-22 NOTE — PROGRESS NOTES
"Otolaryngology Progress Note  September 22, 2018    S: Ms Burden had no acute events overnight. She was afebrile and VSS and wnl. MAPs were appropriate and flap checks were wnl. Tube feeds have been held 2/2 nausea, she denies any nausea this morning. Her pain is well controlled, she has been up out of bed walking the halls.     O: /73  Temp 97.8  F (36.6  C) (Axillary)  Resp 16  Ht 1.65 m (5' 4.96\")  Wt 99.3 kg (218 lb 14.7 oz)  SpO2 93%  BMI 36.47 kg/m2    General: Alert and oriented x 3, sitting upright in chair   HEENT: Oral flap soft, warm, pale with strong implantable doppler signal. No signs of flap congestion or dehiscenence. Some crusting at posterior oropharynx. Lateral rhinotomy incision clean, dry, intact. Some oozing from right nasal cavity. Neck incision clean, dry, intact. Neck soft and flat without evidence of hematoma or fluid collection. Penrose in place. NG in place.   Pulmonary: Breathing comfortably on room air, no stridor or difficulty breathing   Extremities: Left arm dressed with splint. Wound vac holding suction. BRYAN drains x 1 in place and holding suction with serosanguinous drainage in bulbs.       Intake/Output Summary (Last 24 hours) at 09/21/18 1034  Last data filed at 09/21/18 1000   Gross per 24 hour   Intake          4254.44 ml   Output             3772 ml   Net           482.44 ml     BRYAN drain output(s): (last 24 hours)/(last shift)  Left arm: 1, 15, 10 = 26cc    LABS:  ROUTINE IP LABS (Last four results)  BMP  Recent Labs  Lab 09/21/18  0256 09/20/18  1743 09/20/18  1410 09/20/18  1118  09/17/18  1216    139 139 138  < > 138   POTASSIUM 3.9 3.6 3.8 4.1  < > 4.2   CHLORIDE 107 107  --   --   --  106   SAUNDRA 8.2* 8.1*  --   --   --  9.4   CO2 23 23  --   --   --  24   BUN 12 15  --   --   --  17   CR 0.65 0.65  --   --   --  0.83   * 182* 127* 122*  < > 95   < > = values in this interval not displayed.  CBC  Recent Labs  Lab 09/21/18  0256 09/20/18  1743 " 09/20/18  1410 09/20/18  1118  09/17/18  1216   WBC 10.1 15.5*  --   --   --  5.3   RBC 2.71* 3.05*  --   --   --  4.49   HGB 8.8* 9.9* 9.7* 10.2*  < > 14.3   HCT 26.3* 29.8*  --   --   --  44.5   MCV 97 98  --   --   --  99   MCH 32.5 32.5  --   --   --  31.8   MCHC 33.5 33.2  --   --   --  32.1   RDW 14.0 13.7  --   --   --  13.7    245  --   --   --  299   < > = values in this interval not displayed.  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 She is POD#2 s/p right inferior maxillectomy with left radial forearm free flap reconstruction and split thickness skin graft.     Neuro:  - Analgesia per SICU- tylenol; dilaudid and oxycodone prn    HEENT:  - Nothing around the neck (no gown ties, trach ties, lines, etc.)  - RN flap checks Q1H x 48h (until Saturday PM), Q2H x 24h, then Q4H  - ENT flap checks Q6H (until Sunday PM) --> Q8H  - Clean incisions with 0.9% sodium chloride and apply Aquaphor Q8H  - Aquaphor to lips QID  - Monitor and record BRYAN drain output Qshift  - Peridex oral rinses QID  - Saline oral rinses Q8H and PRN. Gentle suction with red jade catheter only (no yankeur), do not cut red jade  - Flap ppx- ASA 325mg qAM  - Nasal sling PRN for nasal drainage  - Left radial forearm: Wound vac and plaster splint, BRYAN x 1. Weightbearing as tolerated, keep wrist in neutral position. Wound vac down and dressing change on POD5.  - STSG: Calcium alginate/tegarderm dressing. Replace/reinforce PRN while wound is draining. Once wound is no longer draining the wound can be left open to air, applying Aquaphor to keep moist.    Respiratory:  - Saturating well on room air  - Supplemental O2 to maintain saturations > 92%    CV/heme:  - NO vasopressors  - MAP goal >60, please contact ENT before giving any  fluid boluses, recommend albumin or blood before giving any crystalloids  - Hemodynamically stable- P and BP wnl  - Hgb stable  - Hx of HTN: resume home lisinopril-HCTZ, simvastatin    FEN/GI:  - NPO  - Nutrition consult for TF recommendations  - OK to restart tube feeds this morning, hold for any nausea  - mIVF: 100cc/hr  - Net I/O +3.6L since admission  - Multivitamins; prosource  - Bowel regimen: senna-docusate, added miralax    :  - Voiding independently    Endo  - No active issues    ID:  - Perioperative antibiotics (Cipro/flagyl due to penicillin allergy) x 48h post-op  - Afebrile  - WBC count wnl  - No signs or symptoms of infection    PPX:  - Protonix  - SQ heparin 5,000 units Q8H  - SCDs    Dispo: SICU x 72 hours for flap monitoring      -- Patient and above plan to be discussed with staff, Dr. Garza and Dr. Matt Baker MD  Otolaryngology- Head and Neck Surgery, PGY-1  Pager: [phone]  Please contact ENT with questions by dialing * [phone] and entering job code [phone] when prompted.

## 2018-09-22 NOTE — PLAN OF CARE
Problem: Patient Care Overview  Goal: Plan of Care/Patient Progress Review  4AB-PT: Defer: PT consult received and appreciated. Per chart review and discussion with OT, pt with no acute PT needs at this time. Up with SBA-supervision. OT to continue to follow for ADLs, activity tolerance, and splint fabrication. PT to defer. Will complete orders, please re-consult if pt has change in status.

## 2018-09-22 NOTE — PROGRESS NOTES
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 gomes and Matt. Admitted to SICU for frequent flap checks.    TODAY'S PROGRESS/PLANS:   - Continue flap checks per ENT recs  - Start tube feeds  - Start melatonin  - Increase mobilization/up out of bed  - okay to stop telemetry     PLAN:   Neuro/ pain/ sedation:  # acute post operative pain   #Caffeine headache   - Monitor neurological status. Notify the MD for any acute changes in exam.  - oxycodone PRN for pain  - add  Gabapentin, tylenol scheduled  - Melatonin qPM  - Prn caffeine for headaches      Pulmonary care:   # Extubated 9/21  - Extubated on and stable on nasal cannula/RA  - Supplemental oxygen to keep saturation above 92 %      Cardiovascular:   #HTN:    - Monitor hemodynamic status.   - No pressor requirements; page ENT if pressors mandatory  - Continue PTA lisinopril-HCTZ, simvastatin  - ASA, heparin per ENT     GI care:   # s/p NG tube  - NG placed intraoperatively  - Start trickle tube feeding today.   - PRN Zofran for nausea   - Senna BID      Fluids/ Electrolytes/ Nutrition:   # hypkalemia   # hyphopshatemia   - LR at 100ml/hr for IV fluid hydration -Will consider stopping the IVF once she resumes PO/NG  - qAM BMP  - Electrolyte replacement protocol. K and phos replaced today      Renal/ Fluid Balance:    - Voiding spontaneously, urine output is adequate so far.  - Will continue to monitor intake and output.      Endocrine:    - No management indication. Start s/s if needed       ID/ Antibiotics:  #Perioperative prophylaxis:  - Ciprofloxacin, flagyl through 9/22 per ENT      Heme:     - Hemoglobin stable.  - qAM CBC      MSK:    # S/p right maxillectomy, right neck exploration, left forearm free flap, split thickness skin graft, and nasogastric feeding tube placement on 9/20/2018   - OT/PT when able  - Encouraged activity/ambulation  - Flap checks per ENT recs  -  BRYAN drain and wound VAC management per ENT       General cares and Prophylaxis:    - SCDs for mechanical prophylaxis for DVT.  - Heparin 5000u q8hrs      Lines/ tubes/ drains:  - PIV x2, BRYAN, wound vac, penrose      Disposition:  - Surgical ICU    ====================================  SUBJECTIVE:  No acute events overnight. Pain adequately controlled with current regimen. Small nose bleed but too bothersome for patient as not able to use packing or sprays. Denies nausea this am, denies chest pain, fever, chills or sweats.     OBJECTIVE:   1. VITAL SIGNS:   Temp:  [97.7  F (36.5  C)-98.7  F (37.1  C)] 98.7  F (37.1  C)  Heart Rate:  [54-89] 89  Resp:  [10-26] 23  BP: (112-142)/(46-77) 140/76  SpO2:  [92 %-99 %] 96 %  Ventilation Mode: CPAP/PS  (Continuous positive airway pressure with Pressure Support)  FiO2 (%): 30 %  Rate Set (breaths/minute): 16 breaths/min  Tidal Volume Set (mL): 500 mL  PEEP (cm H2O): 5 cmH2O  Pressure Support (cm H2O): 7 cmH2O  Oxygen Concentration (%): 30 %  Resp: 23    2. INTAKE/ OUTPUT:   I/O last 3 completed shifts:  In: 3868.33 [I.V.:3523.33; NG/GT:330]  Out: 3071 [Urine:3045; Drains:26]    3. PHYSICAL EXAMINATION:   General: NAD,afebrile, cooperative, conversational siting up in chair this am   HEENT/Neck: Normocephalic, right-sided neck incision with penrose drain. Some swelling of right face   Respiratory: Lung sounds clear to auscultation b/l with vent sounds  Cardiovascular: Regular rate and rhythm.  Gastrointestinal: Abdomen soft, non-distended, non-tender to palpation. No organomegaly or masses appreciated.  Extremities: No limb deformities. No pedal edema. Peripheral pulses present. Left forearm wrapped in ACE wrapswith wound vac line coming  through, BRYAN with minimal serosanguinous output exiting just distal to left antecubital fossa. Fingers warms with brisk cap refill   Skin: As noted above. No rashes or lesions appreciated.     4. INVESTIGATIONS:   Arterial Blood Gases     Recent Labs  Lab 09/21/18  0256 09/20/18  1743 09/20/18  1410 09/20/18  1118   PH 7.47* 7.32* 7.37 7.38   PCO2 34* 43 41 42   PO2 145* 159* 72* 72*   HCO3 25 22 23 25     Complete Blood Count     Recent Labs  Lab 09/22/18  0547 09/21/18  0256 09/20/18  1743 09/20/18  1410  09/17/18  1216   WBC 9.2 10.1 15.5*  --   --  5.3   HGB 9.6* 8.8* 9.9* 9.7*  < > 14.3    197 245  --   --  299   < > = values in this interval not displayed.  Basic Metabolic Panel    Recent Labs  Lab 09/22/18  0547 09/21/18  0256 09/20/18 1743 09/20/18  1410  09/17/18  1216    140 139 139  < > 138   POTASSIUM 3.3* 3.9 3.6 3.8  < > 4.2   CHLORIDE 106 107 107  --   --  106   CO2 25 23 23  --   --  24   BUN 8 12 15  --   --  17   CR 0.61 0.65 0.65  --   --  0.83   * 120* 182* 127*  < > 95   < > = values in this interval not displayed.  Liver Function Tests    Recent Labs  Lab 09/22/18  0547 09/21/18  0256 09/20/18  1743 09/17/18  1216   ALBUMIN  --  3.2*  --   --    INR 1.10 1.17* 1.11 0.94     Pancreatic Enzymes  No lab results found in last 7 days.  Coagulation Profile    Recent Labs  Lab 09/22/18  0547 09/21/18  0256 09/20/18  1743 09/17/18  1216   INR 1.10 1.17* 1.11 0.94     Lactate  Invalid input(s): LACTATE    5. RADIOLOGY:   Recent Results (from the past 24 hour(s))   XR Chest Port 1 View    Narrative    EXAM: XR CHEST PORT 1 VW  9/20/2018 5:27 PM      HISTORY: Endotracheal tube positioning;     COMPARISON: Outside radiograph 8/6/2018    FINDINGS: AP image of the chest. Enteric tube projects over the  stomach. Endotracheal tube tip projects over the midthoracic trachea.  The trachea is likely midline when accounting for the rotation of  projection. Cardiac silhouette is within normal  limits. No pleural  effusion or pneumothorax. Asymmetric patchy airspace and interstitial  opacities in the left lung.       Impression    IMPRESSION:   1. Endotracheal tube tip projects over the midthoracic trachea.  2. Asymmetric airspace and interstitial opacities in the left lung,  suggesting infection or asymmetric pulmonary edema.    I have personally reviewed the examination and initial interpretation  and I agree with the findings.    MARY LAUREANO MD   XR Chest Port 1 View    Narrative    EXAM: XR CHEST PORT 1 VW  9/20/2018 5:53 PM      HISTORY: ETT placement;     COMPARISON: 9/20/2018 1710 hours    FINDINGS: AP image of the chest. Endotracheal tube tip projects over  the midthoracic trachea, 6.9 cm above the mt. Feeding tube tip  projects over the stomach. The trachea is midline. Cardiomediastinal  silhouette is within normal limits. No pleural effusion or  pneumothorax. Asymmetric patchy airspace and interstitial opacities in  the left lung, stable.       Impression    IMPRESSION:   1. No change in exam compared to radiograph performed 20 minutes  prior. Endotracheal tube tip projects over the midthoracic trachea.  2. Unchanged asymmetric mixed interstitial and airspace opacities in  the left lung suggesting infection or asymmetric pulmonary edema.    I have personally reviewed the examination and initial interpretation  and I agree with the findings.    SUZAN JACK MD

## 2018-09-22 NOTE — PLAN OF CARE
Neuro: Intact. Q 2 hrs flap checks. Pink pale soft to touch. Continuous doppler in place.    CV: Hemodynamically stable.    Resp: Sounds clear on RA sating 94% >,    GI: Hypoactive bowel sounds. NJ in place clamped.     : Adequate urine production via urinal.    Plan: Q2 hr flap checks, hemodynamic monitoring, pain management and notifying the MD with any concerns.

## 2018-09-22 NOTE — PLAN OF CARE
Problem: Patient Care Overview  Goal: Plan of Care/Patient Progress Review  Discharge Planner OT   Patient plan for discharge: home with A from family prn  Current status: Pt demonstrated mobility- walking 500', LE dressing and standing g/h tasks with SBA or better. VSS throughout session while on RA. Pt had no complaints of adverse symptoms during activity.  Barriers to return to prior living situation: post surgical precautions  Recommendations for discharge: home with A from family prn  Rationale for recommendations: anticipate pt will be safe to complete all ADLs with A from family prn.        Entered by: Mat Juarez 09/22/2018 9:21 AM

## 2018-09-23 LAB
ABO + RH BLD: NORMAL
ABO + RH BLD: NORMAL
ANION GAP SERPL CALCULATED.3IONS-SCNC: 8 MMOL/L (ref 3–14)
BLD GP AB SCN SERPL QL: NORMAL
BLOOD BANK CMNT PATIENT-IMP: NORMAL
BUN SERPL-MCNC: 10 MG/DL (ref 7–30)
CALCIUM SERPL-MCNC: 8.7 MG/DL (ref 8.5–10.1)
CHLORIDE SERPL-SCNC: 106 MMOL/L (ref 94–109)
CO2 SERPL-SCNC: 26 MMOL/L (ref 20–32)
CREAT SERPL-MCNC: 0.63 MG/DL (ref 0.52–1.04)
ERYTHROCYTE [DISTWIDTH] IN BLOOD BY AUTOMATED COUNT: 14 % (ref 10–15)
GFR SERPL CREATININE-BSD FRML MDRD: >90 ML/MIN/1.7M2
GLUCOSE SERPL-MCNC: 118 MG/DL (ref 70–99)
HCT VFR BLD AUTO: 29.4 % (ref 35–47)
HGB BLD-MCNC: 9.7 G/DL (ref 11.7–15.7)
INR PPP: 1 (ref 0.86–1.14)
MAGNESIUM SERPL-MCNC: 2.1 MG/DL (ref 1.6–2.3)
MCH RBC QN AUTO: 32.3 PG (ref 26.5–33)
MCHC RBC AUTO-ENTMCNC: 33 G/DL (ref 31.5–36.5)
MCV RBC AUTO: 98 FL (ref 78–100)
PHOSPHATE SERPL-MCNC: 3.1 MG/DL (ref 2.5–4.5)
PLATELET # BLD AUTO: 259 10E9/L (ref 150–450)
POTASSIUM SERPL-SCNC: 3.4 MMOL/L (ref 3.4–5.3)
RBC # BLD AUTO: 3 10E12/L (ref 3.8–5.2)
SODIUM SERPL-SCNC: 140 MMOL/L (ref 133–144)
SPECIMEN EXP DATE BLD: NORMAL
WBC # BLD AUTO: 7.6 10E9/L (ref 4–11)

## 2018-09-23 PROCEDURE — 99233 SBSQ HOSP IP/OBS HIGH 50: CPT | Performed by: NURSE PRACTITIONER

## 2018-09-23 PROCEDURE — 93010 ELECTROCARDIOGRAM REPORT: CPT | Performed by: INTERNAL MEDICINE

## 2018-09-23 PROCEDURE — 36415 COLL VENOUS BLD VENIPUNCTURE: CPT | Performed by: ANESTHESIOLOGY

## 2018-09-23 PROCEDURE — 25000132 ZZH RX MED GY IP 250 OP 250 PS 637: Performed by: STUDENT IN AN ORGANIZED HEALTH CARE EDUCATION/TRAINING PROGRAM

## 2018-09-23 PROCEDURE — 80048 BASIC METABOLIC PNL TOTAL CA: CPT | Performed by: STUDENT IN AN ORGANIZED HEALTH CARE EDUCATION/TRAINING PROGRAM

## 2018-09-23 PROCEDURE — 25000128 H RX IP 250 OP 636: Performed by: STUDENT IN AN ORGANIZED HEALTH CARE EDUCATION/TRAINING PROGRAM

## 2018-09-23 PROCEDURE — E2402 NEG PRESS WOUND THERAPY PUMP: HCPCS

## 2018-09-23 PROCEDURE — 36415 COLL VENOUS BLD VENIPUNCTURE: CPT | Performed by: STUDENT IN AN ORGANIZED HEALTH CARE EDUCATION/TRAINING PROGRAM

## 2018-09-23 PROCEDURE — 80048 BASIC METABOLIC PNL TOTAL CA: CPT | Performed by: ANESTHESIOLOGY

## 2018-09-23 PROCEDURE — 85027 COMPLETE CBC AUTOMATED: CPT | Performed by: STUDENT IN AN ORGANIZED HEALTH CARE EDUCATION/TRAINING PROGRAM

## 2018-09-23 PROCEDURE — 83735 ASSAY OF MAGNESIUM: CPT | Performed by: ANESTHESIOLOGY

## 2018-09-23 PROCEDURE — 40000556 ZZH STATISTIC PERIPHERAL IV START W US GUIDANCE

## 2018-09-23 PROCEDURE — 85027 COMPLETE CBC AUTOMATED: CPT | Performed by: ANESTHESIOLOGY

## 2018-09-23 PROCEDURE — 84100 ASSAY OF PHOSPHORUS: CPT | Performed by: ANESTHESIOLOGY

## 2018-09-23 PROCEDURE — 85610 PROTHROMBIN TIME: CPT | Performed by: ANESTHESIOLOGY

## 2018-09-23 PROCEDURE — 12000008 ZZH R&B INTERMEDIATE UMMC

## 2018-09-23 PROCEDURE — 86850 RBC ANTIBODY SCREEN: CPT | Performed by: STUDENT IN AN ORGANIZED HEALTH CARE EDUCATION/TRAINING PROGRAM

## 2018-09-23 PROCEDURE — 25000125 ZZHC RX 250: Performed by: STUDENT IN AN ORGANIZED HEALTH CARE EDUCATION/TRAINING PROGRAM

## 2018-09-23 PROCEDURE — 83735 ASSAY OF MAGNESIUM: CPT | Performed by: STUDENT IN AN ORGANIZED HEALTH CARE EDUCATION/TRAINING PROGRAM

## 2018-09-23 PROCEDURE — 25000132 ZZH RX MED GY IP 250 OP 250 PS 637: Performed by: PHYSICIAN ASSISTANT

## 2018-09-23 PROCEDURE — 25000132 ZZH RX MED GY IP 250 OP 250 PS 637: Performed by: NURSE PRACTITIONER

## 2018-09-23 PROCEDURE — 93005 ELECTROCARDIOGRAM TRACING: CPT

## 2018-09-23 PROCEDURE — 86901 BLOOD TYPING SEROLOGIC RH(D): CPT | Performed by: STUDENT IN AN ORGANIZED HEALTH CARE EDUCATION/TRAINING PROGRAM

## 2018-09-23 PROCEDURE — 86900 BLOOD TYPING SEROLOGIC ABO: CPT | Performed by: STUDENT IN AN ORGANIZED HEALTH CARE EDUCATION/TRAINING PROGRAM

## 2018-09-23 PROCEDURE — 84100 ASSAY OF PHOSPHORUS: CPT | Performed by: STUDENT IN AN ORGANIZED HEALTH CARE EDUCATION/TRAINING PROGRAM

## 2018-09-23 RX ORDER — POTASSIUM CHLORIDE 1.5 G/1.58G
40 POWDER, FOR SOLUTION ORAL ONCE
Status: COMPLETED | OUTPATIENT
Start: 2018-09-23 | End: 2018-09-23

## 2018-09-23 RX ORDER — CALCIUM CARBONATE 500 MG/1
500 TABLET, CHEWABLE ORAL DAILY PRN
Status: DISCONTINUED | OUTPATIENT
Start: 2018-09-23 | End: 2018-09-23

## 2018-09-23 RX ORDER — METOPROLOL TARTRATE 1 MG/ML
5 INJECTION, SOLUTION INTRAVENOUS EVERY 5 MIN PRN
Status: DISCONTINUED | OUTPATIENT
Start: 2018-09-23 | End: 2018-09-26 | Stop reason: HOSPADM

## 2018-09-23 RX ORDER — CALCIUM CARBONATE 500 MG/1
1000 TABLET, CHEWABLE ORAL 3 TIMES DAILY PRN
Status: DISCONTINUED | OUTPATIENT
Start: 2018-09-23 | End: 2018-09-26 | Stop reason: HOSPADM

## 2018-09-23 RX ADMIN — POLYETHYLENE GLYCOL 3350 17 G: 17 POWDER, FOR SOLUTION ORAL at 07:45

## 2018-09-23 RX ADMIN — CHLORHEXIDINE GLUCONATE 0.12% ORAL RINSE 15 ML: 1.2 LIQUID ORAL at 07:46

## 2018-09-23 RX ADMIN — SENNOSIDES AND DOCUSATE SODIUM 2 TABLET: 8.6; 5 TABLET ORAL at 21:50

## 2018-09-23 RX ADMIN — ASPIRIN 325 MG ORAL TABLET 325 MG: 325 PILL ORAL at 07:45

## 2018-09-23 RX ADMIN — SIMVASTATIN 40 MG: 20 TABLET, FILM COATED ORAL at 21:50

## 2018-09-23 RX ADMIN — Medication 1 PACKET: at 21:51

## 2018-09-23 RX ADMIN — ACETAMINOPHEN 975 MG: 325 TABLET, FILM COATED ORAL at 21:49

## 2018-09-23 RX ADMIN — HEPARIN SODIUM 5000 UNITS: 5000 INJECTION, SOLUTION INTRAVENOUS; SUBCUTANEOUS at 18:15

## 2018-09-23 RX ADMIN — Medication 1 PACKET: at 14:15

## 2018-09-23 RX ADMIN — Medication 5 MG: at 21:49

## 2018-09-23 RX ADMIN — SENNOSIDES AND DOCUSATE SODIUM 2 TABLET: 8.6; 5 TABLET ORAL at 07:45

## 2018-09-23 RX ADMIN — POTASSIUM CHLORIDE 40 MEQ: 1.5 POWDER, FOR SOLUTION ORAL at 09:59

## 2018-09-23 RX ADMIN — HEPARIN SODIUM 5000 UNITS: 5000 INJECTION, SOLUTION INTRAVENOUS; SUBCUTANEOUS at 10:00

## 2018-09-23 RX ADMIN — LISINOPRIL AND HYDROCHLOROTHIAZIDE 1 TABLET: 12.5; 1 TABLET ORAL at 07:46

## 2018-09-23 RX ADMIN — CHLORHEXIDINE GLUCONATE 0.12% ORAL RINSE 15 ML: 1.2 LIQUID ORAL at 16:24

## 2018-09-23 RX ADMIN — CAFFEINE 200 MG: 200 TABLET ORAL at 07:45

## 2018-09-23 RX ADMIN — Medication 1 PACKET: at 07:46

## 2018-09-23 RX ADMIN — MULTIVITAMIN 15 ML: LIQUID ORAL at 07:45

## 2018-09-23 RX ADMIN — SODIUM CHLORIDE, POTASSIUM CHLORIDE, SODIUM LACTATE AND CALCIUM CHLORIDE: 600; 310; 30; 20 INJECTION, SOLUTION INTRAVENOUS at 03:33

## 2018-09-23 RX ADMIN — ACETAMINOPHEN 975 MG: 325 TABLET, FILM COATED ORAL at 03:33

## 2018-09-23 RX ADMIN — HEPARIN SODIUM 5000 UNITS: 5000 INJECTION, SOLUTION INTRAVENOUS; SUBCUTANEOUS at 02:07

## 2018-09-23 RX ADMIN — WHITE PETROLATUM: 1.75 OINTMENT TOPICAL at 07:46

## 2018-09-23 RX ADMIN — WHITE PETROLATUM: 1.75 OINTMENT TOPICAL at 16:24

## 2018-09-23 ASSESSMENT — ACTIVITIES OF DAILY LIVING (ADL)
ADLS_ACUITY_SCORE: 11
ADLS_ACUITY_SCORE: 9
ADLS_ACUITY_SCORE: 11
ADLS_ACUITY_SCORE: 11
ADLS_ACUITY_SCORE: 9
ADLS_ACUITY_SCORE: 9

## 2018-09-23 NOTE — PROGRESS NOTES
"Otolaryngology Free Flap Note  Virtual Check: 1445    Subjective:  No acute concerns per nursing.    Objective:  /79 (BP Location: Right arm)  Temp 97.4  F (36.3  C) (Axillary)  Resp 16  Ht 1.65 m (5' 4.96\")  Wt 99.3 kg (218 lb 14.7 oz)  SpO2 95%  BMI 36.47 kg/m2  Intraoral flap is soft, warm, and pale. Appears viable without evidence of congestion, there is no hematoma visualized. Arterial signals are strong and appropriate on implantable doppler. Right neck incision is well-approximated.and without drainage or bleeding. The infra-incisional area continues to be slightly swollen but is stable from this morning when Dr. Rhodes saw the patient. The area is slightly tense, no pain to palpation over the area, skin appears normal/pink, no increased drain output.  Left arm is wrapped. Good capillary refill. Sensation intact to light touch.   Leg dressing is intact over STSG donor site with no concerns    Assessment & Plan: Flap is stable  - Continue plan of care  - Next otolaryngology flap check tonight in 8 hours  - Call otolaryngology on-call with questions or concerns      Edward Stephenson MD  Otolaryngology- Head and Neck Surgery, PGY-2  Please contact ENT with questions by dialing * [phone] and entering job code [phone] when prompted.     "

## 2018-09-23 NOTE — PROGRESS NOTES
"ENT Free Flap Check  September 22, 2018  8:08 PM     S: No issues per nursing with the flap. Doing well overall. Sitting up in the chair. Feels \"great.\"    O: BP (!) 125/94  Temp 98.8  F (37.1  C) (Axillary)  Resp 16  Ht 1.65 m (5' 4.96\")  Wt 99.3 kg (218 lb 14.7 oz)  SpO2 97%  BMI 36.47 kg/m2  Intraoral flap is soft, warm, and pale. Appears viable without evidence of congestion, no hematoma visualized, strong doppler sounds from implantable doppler. Sutures are in place. No dehiscence. Minimal swelling. No bruising. Wong-Langston incision healing nicely.  Left arm is wrapped. Good capillary refill. Sensation intact to light touch. Wound vac holding suction.    A/P: Stable flap  -Continue current plan of care  -Call with questions/concerns    Antonio Zapata MD  PGY-5, Otolaryngology    "

## 2018-09-23 NOTE — PROGRESS NOTES
"Otolaryngology Free Flap Note  In-person Check: 17:50    Subjective:  Patient states that she is doing well. Pain is well-controlled. She denies pain and denies having nausea, vomiting, or abdominal fullness since resuming tube feeds today.    Objective:  BP (!) 125/94  Temp 98.8  F (37.1  C) (Axillary)  Resp 16  Ht 1.65 m (5' 4.96\")  Wt 99.3 kg (218 lb 14.7 oz)  SpO2 97%  BMI 36.47 kg/m2  Intraoral flap is soft, warm, and pale. Appears viable without evidence of congestion, there is no hematoma visualized. Arterial signals are strong and appropriate on implantable doppler. Sutures are in place without dehiscence. Minimal swelling. No bruising. Wong-Langston incision well-approximated without bleeding or drainage. Skin is soft. Right neck incision is soft, well-approximated,.and without drainage or bleeding.  Left arm is wrapped. Good capillary refill. Sensation intact to light touch. Wound vac holding suction.    Assessment & Plan: Flap is stable  - Continue plan of care  - Call otolaryngology on-call with questions or concerns.      Tiffany Rhodes MD  Otolaryngology- Head and Neck Surgery, PGY-2  Pager: [phone]  Please contact ENT with questions by dialing * [phone] and entering job code [phone] when prompted.     "

## 2018-09-23 NOTE — PROGRESS NOTES
"Otolaryngology Free Flap Note  In-person Check: 06:55    Subjective:  No concerns from RN. She denies having nausea, vomiting, or abdominal fullness. She denies pain.    Objective:  /77  Temp 98.2  F (36.8  C) (Axillary)  Resp 16  Ht 1.65 m (5' 4.96\")  Wt 99.3 kg (218 lb 14.7 oz)  SpO2 94%  BMI 36.47 kg/m2  Intraoral flap is soft, warm, and pale. Appears viable without evidence of congestion. There is no hematoma visualized. Incision lines are well-approximated with sutures in place. Arterial signals are strong and appropriate on implantable doppler. Wong-Langston incision well-approximated without bleeding or drainage. Right neck incision is  well-approximated and without drainage or bleeding.  Left arm is wrapped. Good capillary refill. Sensation intact to light touch. Wound vac holding suction.  Left thigh donor site within normal limits.    Assessment & Plan: Flap is stable  - Continue plan of care  - Continue Q6H otolaryngology flap checks  - Call otolaryngology on-call with questions or concerns      Tiffany Rhodes MD  Otolaryngology- Head and Neck Surgery, PGY-2  Pager: [phone]  Please contact ENT with questions by dialing * [phone] and entering job code [phone] when prompted.     "

## 2018-09-23 NOTE — PROGRESS NOTES
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 Garza and Matt. Admitted to SICU for frequent flap checks.    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 s/p right maxillectomy, right neck exploration, left forearm free flap, split thickness skin graft, and nasogastric feeding tube placement (9/20)  # HTN    PLAN BY SYSTEMS:    HEMODYNAMICS:  - HR: stable; SBP: 120-140; MAP: > 75  - continue  for flap  - continue home lisinopril-HCTZ 10-12.5 mg PO daily, simvastatin 40 mg PO QHS    NEUROLOGIC:  - GCS: 15; RASS: 0; CAM ICU: negative  - pain medications: tylenol schedule, oxy prn, dilaudid prn  - delirium management: none  - sleep management: melatonin 5 mg PO QHS    PULMONARY:  - on room air, O2 sats appropriate  - continue aggressive pulmonary hygiene    RENAL:  - I/O: even yesterday  - UOP: 3.2 L yesterday  - no issues  - continue to follow electrolytes, renal function, and UOP closely    ID:  - Tmax: 98.9  - cultures: none  - ABX: completed    HEME:  - no ongoing bleeding noted    VASCULAR ACCESS:  - PIV    GI/NUTRITION:  - TF, advance to goal as tolerated  - bowel regimen: miralax, senokot    ENDOCRINE:  - glucose: stable on electrolyte panel    MSK:  - PT/OT consulted  - activity: mobilize    SKIN:  - flap checks per nursing and primary surgical team    PROPHYLAXIS:   - DVT: heparin 5000 TID  - GI: no  indication    CODE STATUS:   - FULL CODE    DISPOSITION:  - SICU until flap checks complete    GENERAL CARES  DVT Prophylaxis: Heparin SQ  GI Prophylaxis: Not indicated  Restraints: Restraints for medical healing needed: YES  Family update by me today: Yes     PARVEZ Garcia, CNP    Time Spent on this Encounter   Billing:  I spent 35 minutes bedside and on the inpatient unit today managing the critical care of Martita Burden in relation to the issues listed in this note.    Interval History   - no acute events overnight  - patient seen and examined, chart reviewed  - discussed with Dr. Smith and SICU team on multidisciplinary rounds  - no pain  - no complaints    Physical Exam   Temp: 97.4  F (36.3  C) Temp src: Axillary Temp  Min: 97.4  F (36.3  C)  Max: 98.9  F (37.2  C) BP: 116/79   Heart Rate: 81 Resp: 15 SpO2: 94 % O2 Device: None (Room air)    Vitals:    09/20/18 0524 09/20/18 1645 09/22/18 0000   Weight: 95.5 kg (210 lb 8.6 oz) 98.7 kg (217 lb 9.5 oz) 99.3 kg (218 lb 14.7 oz)     I/O last 3 completed shifts:  In: 2940 [I.V.:2300; NG/GT:360]  Out: 3564 [Urine:3525; Drains:39]    GEN: no acute distress, sitting up in chair, ambulating in halls  EYES: PERRL, Anicteric sclera.    HEENT:  Normocephalic, atraumatic, trachea midline  CV: RRR, no gallops, rubs, or murmurs  PULM/CHEST: Clear breath sounds bilaterally without rhonchi, crackles or wheeze, symmetric chest rise  GI: normal bowel sounds, soft, non-tender, no rebound tenderness or guarding, no masses  : lanza catheter in place, urine yellow and clear  EXTREMITIES: no peripheral edema, moving all extremities, peripheral pulses intact  NEURO: Cranial nerves II-XII grossly intact, no motor-sensory deficits noted  SKIN: No rashes, sores or ulcerations.  Right neck incision c/d/i.    PSYCH:  Affect: appropriate not anxious, mentation at baseline, not altered, no hallucinations  Imaging personally reviewed:  ECG    Medications     IV fluid REPLACEMENT ONLY          acetaminophen  975 mg Per Feeding Tube Q8H     aspirin  325 mg Oral Daily     chlorhexidine  15 mL Swish & Spit Q8H     heparin  5,000 Units Subcutaneous Q8H     influenza vaccine adult (product based on age)  0.5 mL Intramuscular Prior to discharge     lisinopril-hydrochlorothiazide  1 tablet Per NG tube QAM     melatonin  5 mg Oral At Bedtime     mineral oil-hydrophilic petrolatum   Topical Q8H     multivitamins with minerals  15 mL Per Feeding Tube Daily     polyethylene glycol  17 g Per Feeding Tube Daily     protein modular  1 packet Per Feeding Tube TID     senna-docusate  2 tablet Oral or NG Tube BID     simvastatin  40 mg Per NG tube QPM     sodium chloride (PF)  3 mL Intracatheter Q8H       Data     Recent Labs  Lab 09/23/18  0325 09/22/18  1618 09/22/18  0547 09/21/18  0256   WBC 7.6  --  9.2 10.1   HGB 9.7*  --  9.6* 8.8*   MCV 98  --  100 97     --  242 197   INR 1.00  --  1.10 1.17*     --  140 140   POTASSIUM 3.4 4.3 3.3* 3.9   CHLORIDE 106  --  106 107   CO2 26  --  25 23   BUN 10  --  8 12   CR 0.63  --  0.61 0.65   ANIONGAP 8  --  9 10   SAUNDRA 8.7  --  8.9 8.2*   *  --  111* 120*   ALBUMIN  --   --   --  3.2*     No results found for this or any previous visit (from the past 24 hour(s)).

## 2018-09-23 NOTE — PLAN OF CARE
Problem: Patient Care Overview  Goal: Plan of Care/Patient Progress Review  Outcome: Improving  No acute changes during day. Flap warm, soft, perfused. CMS intact in LUE. Oozing from L thigh, dressing changed. Ambulated in sun 3x, OOB most of day. TF advanced to 30cc/hr, tolerating. Passing gas, awaiting BM. Voids in commode, adequate UO. IVF d/c'd.    Plan: advance TF to 40cc/hr (goal) at 2200. Transfer to .

## 2018-09-23 NOTE — PLAN OF CARE
Neuro: Intact. Q 2 hrs flap checks. Pink pale soft to touch. Continuous doppler in place.     CV: Hemodynamically stable.     Resp: Sounds clear on RA sating 94% >,     GI: Hypoactive bowel sounds. NJ in place Tf at 20ml/hr with standard flushes. No BM.      : Adequate urine production via urinal.     Plan: Q2 hr flap checks, hemodynamic monitoring, pain management and notifying the MD with any concerns.

## 2018-09-23 NOTE — PROGRESS NOTES
"Otolaryngology Progress Note  September 23, 2018    S: Ms Burden had no acute events overnight. She was afebrile and VSS and wnl. MAPs were appropriate and flap checks were wnl. She denies nausea, vomiting, and abdominal fullness since tube feeds have been resumed. She denies pain.    O: /77  Temp 98.9  F (37.2  C) (Axillary)  Resp 16  Ht 1.65 m (5' 4.96\")  Wt 99.3 kg (218 lb 14.7 oz)  SpO2 94%  BMI 36.47 kg/m2   General: resting comfortably in chair; not in acute distress   Neuro: alert, oriented, and answering questions appropriately   HEENT: Oral flap soft, warm, pale with strong implantable doppler signal. No signs of flap congestion or dehiscenence. Lateral rhinotomy incision clean, dry, intact. Some oozing from right nasal cavity. Neck incision clean, dry, intact. Neck soft and flat without evidence of hematoma or fluid collection. Penrose in place. NG in place.   Pulmonary: Breathing comfortably on room air, no stridor or difficulty breathing   Extremities: Left arm dressed with splint. Fingers with intact sensation and good capillary refill. Wound vac holding suction. BRYAN drains x 1 in place and holding suction with serosanguinous drainage in bulbs.       Intake/Output Summary (Last 24 hours) at 09/21/18 1034  Last data filed at 09/21/18 1000   Gross per 24 hour   Intake          4254.44 ml   Output             3772 ml   Net           482.44 ml     BRYAN drain output(s): (last 24 hours)/(last shift)  Left arm: 10, 10, 17 = 37cc    LABS:  ROUTINE IP LABS (Last four results)  BMP    Recent Labs  Lab 09/23/18  0325 09/22/18  1618 09/22/18  0547 09/21/18  0256 09/20/18  1743     --  140 140 139   POTASSIUM 3.4 4.3 3.3* 3.9 3.6   CHLORIDE 106  --  106 107 107   SAUNDRA 8.7  --  8.9 8.2* 8.1*   CO2 26  --  25 23 23   BUN 10  --  8 12 15   CR 0.63  --  0.61 0.65 0.65   *  --  111* 120* 182*     CBC    Recent Labs  Lab 09/23/18  0325 09/22/18  0547 09/21/18  0256 09/20/18  1743   WBC 7.6 9.2 10.1 " 15.5*   RBC 3.00* 3.00* 2.71* 3.05*   HGB 9.7* 9.6* 8.8* 9.9*   HCT 29.4* 30.0* 26.3* 29.8*   MCV 98 100 97 98   MCH 32.3 32.0 32.5 32.5   MCHC 33.0 32.0 33.5 33.2   RDW 14.0 14.2 14.0 13.7    242 197 245     INR    Recent Labs  Lab 09/23/18  0325 09/22/18  0547 09/21/18  0256 09/20/18  1743   INR 1.00 1.10 1.17* 1.11       A/P: Martita Burden is a 62-year-old female with a PMH significant for hypertension, allergic rhinitis, and 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 She is POD#3 s/p right inferior maxillectomy with left radial forearm free flap reconstruction and split thickness skin graft.     Neuro:  - Analgesia per SICU- tylenol; dilaudid and oxycodone prn  - Melatonin qhs    HEENT:  - Nothing around the neck (no gown ties, trach ties, lines, etc.)  - RN flap checks Q2H while in SICU, then Q4H  - ENT flap checks Q6H (until Sunday PM) --> Q8H  - Clean incisions with 0.9% sodium chloride and apply Aquaphor Q8H  - Aquaphor to lips QID  - Monitor and record BRYAN drain output Qshift  - Peridex oral rinses QID  - Saline oral rinses Q8H and PRN. Gentle suction with red jade catheter only (no yankeur), do not cut red jade  - Flap ppx- ASA 325mg qAM  - Nasal sling PRN for nasal drainage  - Left radial forearm: Wound vac and plaster splint, BRYAN x 1. Weightbearing as tolerated, keep wrist in neutral position. Wound vac down and dressing change on POD5.  - STSG: Calcium alginate/tegarderm dressing. Replace/reinforce PRN while wound is draining. Once wound is no longer draining the wound can be left open to air, applying Aquaphor to keep moist.    Respiratory:  - Saturating well on room air  - Supplemental O2 to maintain saturations > 92%    CV/heme:  - NO vasopressors  - MAP goal >60, please contact ENT before giving any fluid boluses, recommend albumin or blood before giving any crystalloids  - Hemodynamically stable- P and BP wnl  - Hgb stable  - Hx of HTN: resume home lisinopril-HCTZ,  simvastatin    FEN/GI:  - NPO  - Nutrition consult for TF recommendations- currently tolerating tube feeds well at 20cc/hr without nausea, vomiting, or abdominal fullness  - mIVF: 100cc/hr  - Net I/O +2.8L since admission  - Multivitamins; prosource  - Bowel regimen: senna-docusate, added miralax    :  - Voiding independently    Endo  - No active issues    ID:  - Perioperative antibiotics completed  - Afebrile  - WBC count wnl  - No signs or symptoms of infection    PPX:  - Protonix  - SQ heparin 5,000 units Q8H  - SCDs    Dispo: Transfer to  today      -- Patient and above plan to be discussed with staff, Dr. Garza and Dr. Matt Rhodes MD  Otolaryngology- Head and Neck Surgery, PGY-2  Pager: [phone]  Please contact ENT with questions by dialing * [phone] and entering job code [phone] when prompted.

## 2018-09-23 NOTE — PROGRESS NOTES
"Otolaryngology Free Flap Note  Virtual Check: 00:45    Subjective:  No concerns from RN. She denies having nausea, vomiting, or abdominal fullness since resuming tube feeds today.    Objective:  /72  Temp 98  F (36.7  C) (Axillary)  Resp 14  Ht 1.65 m (5' 4.96\")  Wt 99.3 kg (218 lb 14.7 oz)  SpO2 92%  BMI 36.47 kg/m2  Intraoral flap is soft, warm, and pale. Appears viable without evidence of congestion, there is no hematoma visualized. Arterial signals are strong and appropriate on implantable doppler. Wong-Langston incision well-approximated without bleeding or drainage.Right neck incision is  well-approximated.and without drainage or bleeding.  Left arm is wrapped. Good capillary refill. Sensation intact to light touch. Wound vac holding suction.    Assessment & Plan: Flap is stable  - Continue plan of care  - Next otolaryngology flap check on morning rounds  - Call otolaryngology on-call with questions or concerns      Tiffany Rhodes MD  Otolaryngology- Head and Neck Surgery, PGY-2  Pager: [phone]  Please contact ENT with questions by dialing * [phone] and entering job code [phone] when prompted.     "

## 2018-09-24 ENCOUNTER — APPOINTMENT (OUTPATIENT)
Dept: OCCUPATIONAL THERAPY | Facility: CLINIC | Age: 63
End: 2018-09-24
Attending: OTOLARYNGOLOGY
Payer: COMMERCIAL

## 2018-09-24 ENCOUNTER — HOME INFUSION (PRE-WILLOW HOME INFUSION) (OUTPATIENT)
Dept: PHARMACY | Facility: CLINIC | Age: 63
End: 2018-09-24

## 2018-09-24 ENCOUNTER — APPOINTMENT (OUTPATIENT)
Dept: CARDIOLOGY | Facility: CLINIC | Age: 63
End: 2018-09-24
Attending: INTERNAL MEDICINE
Payer: COMMERCIAL

## 2018-09-24 LAB
ANION GAP SERPL CALCULATED.3IONS-SCNC: 9 MMOL/L (ref 3–14)
ANION GAP SERPL CALCULATED.3IONS-SCNC: 9 MMOL/L (ref 3–14)
BUN SERPL-MCNC: 14 MG/DL (ref 7–30)
BUN SERPL-MCNC: 17 MG/DL (ref 7–30)
CALCIUM SERPL-MCNC: 9.4 MG/DL (ref 8.5–10.1)
CALCIUM SERPL-MCNC: 9.8 MG/DL (ref 8.5–10.1)
CHLORIDE SERPL-SCNC: 106 MMOL/L (ref 94–109)
CHLORIDE SERPL-SCNC: 107 MMOL/L (ref 94–109)
CO2 SERPL-SCNC: 25 MMOL/L (ref 20–32)
CO2 SERPL-SCNC: 26 MMOL/L (ref 20–32)
CREAT SERPL-MCNC: 0.62 MG/DL (ref 0.52–1.04)
CREAT SERPL-MCNC: 0.62 MG/DL (ref 0.52–1.04)
ERYTHROCYTE [DISTWIDTH] IN BLOOD BY AUTOMATED COUNT: 13.8 % (ref 10–15)
ERYTHROCYTE [DISTWIDTH] IN BLOOD BY AUTOMATED COUNT: 13.8 % (ref 10–15)
GFR SERPL CREATININE-BSD FRML MDRD: >90 ML/MIN/1.7M2
GFR SERPL CREATININE-BSD FRML MDRD: >90 ML/MIN/1.7M2
GLUCOSE SERPL-MCNC: 118 MG/DL (ref 70–99)
GLUCOSE SERPL-MCNC: 133 MG/DL (ref 70–99)
HCT VFR BLD AUTO: 32 % (ref 35–47)
HCT VFR BLD AUTO: 33.2 % (ref 35–47)
HGB BLD-MCNC: 10.6 G/DL (ref 11.7–15.7)
HGB BLD-MCNC: 10.8 G/DL (ref 11.7–15.7)
INTERPRETATION ECG - MUSE: NORMAL
INTERPRETATION ECG - MUSE: NORMAL
MAGNESIUM SERPL-MCNC: 2.1 MG/DL (ref 1.6–2.3)
MAGNESIUM SERPL-MCNC: 2.2 MG/DL (ref 1.6–2.3)
MCH RBC QN AUTO: 32.4 PG (ref 26.5–33)
MCH RBC QN AUTO: 32.4 PG (ref 26.5–33)
MCHC RBC AUTO-ENTMCNC: 32.5 G/DL (ref 31.5–36.5)
MCHC RBC AUTO-ENTMCNC: 33.1 G/DL (ref 31.5–36.5)
MCV RBC AUTO: 100 FL (ref 78–100)
MCV RBC AUTO: 98 FL (ref 78–100)
PHOSPHATE SERPL-MCNC: 3.2 MG/DL (ref 2.5–4.5)
PHOSPHATE SERPL-MCNC: 4.1 MG/DL (ref 2.5–4.5)
PLATELET # BLD AUTO: 259 10E9/L (ref 150–450)
PLATELET # BLD AUTO: 309 10E9/L (ref 150–450)
POTASSIUM SERPL-SCNC: 3.8 MMOL/L (ref 3.4–5.3)
POTASSIUM SERPL-SCNC: 4.1 MMOL/L (ref 3.4–5.3)
RBC # BLD AUTO: 3.27 10E12/L (ref 3.8–5.2)
RBC # BLD AUTO: 3.33 10E12/L (ref 3.8–5.2)
SODIUM SERPL-SCNC: 141 MMOL/L (ref 133–144)
SODIUM SERPL-SCNC: 141 MMOL/L (ref 133–144)
WBC # BLD AUTO: 7.5 10E9/L (ref 4–11)
WBC # BLD AUTO: 7.9 10E9/L (ref 4–11)

## 2018-09-24 PROCEDURE — 85027 COMPLETE CBC AUTOMATED: CPT | Performed by: STUDENT IN AN ORGANIZED HEALTH CARE EDUCATION/TRAINING PROGRAM

## 2018-09-24 PROCEDURE — 25000132 ZZH RX MED GY IP 250 OP 250 PS 637: Performed by: STUDENT IN AN ORGANIZED HEALTH CARE EDUCATION/TRAINING PROGRAM

## 2018-09-24 PROCEDURE — 25000128 H RX IP 250 OP 636: Performed by: STUDENT IN AN ORGANIZED HEALTH CARE EDUCATION/TRAINING PROGRAM

## 2018-09-24 PROCEDURE — E2402 NEG PRESS WOUND THERAPY PUMP: HCPCS

## 2018-09-24 PROCEDURE — 93306 TTE W/DOPPLER COMPLETE: CPT | Mod: 26 | Performed by: INTERNAL MEDICINE

## 2018-09-24 PROCEDURE — 93010 ELECTROCARDIOGRAM REPORT: CPT | Performed by: INTERNAL MEDICINE

## 2018-09-24 PROCEDURE — 84100 ASSAY OF PHOSPHORUS: CPT | Performed by: STUDENT IN AN ORGANIZED HEALTH CARE EDUCATION/TRAINING PROGRAM

## 2018-09-24 PROCEDURE — 80048 BASIC METABOLIC PNL TOTAL CA: CPT | Performed by: STUDENT IN AN ORGANIZED HEALTH CARE EDUCATION/TRAINING PROGRAM

## 2018-09-24 PROCEDURE — 83735 ASSAY OF MAGNESIUM: CPT | Performed by: STUDENT IN AN ORGANIZED HEALTH CARE EDUCATION/TRAINING PROGRAM

## 2018-09-24 PROCEDURE — 36415 COLL VENOUS BLD VENIPUNCTURE: CPT | Performed by: STUDENT IN AN ORGANIZED HEALTH CARE EDUCATION/TRAINING PROGRAM

## 2018-09-24 PROCEDURE — 93005 ELECTROCARDIOGRAM TRACING: CPT

## 2018-09-24 PROCEDURE — 40000133 ZZH STATISTIC OT WARD VISIT

## 2018-09-24 PROCEDURE — 97535 SELF CARE MNGMENT TRAINING: CPT | Mod: GO

## 2018-09-24 PROCEDURE — 12000006 ZZH R&B IMCU INTERMEDIATE UMMC

## 2018-09-24 PROCEDURE — 97530 THERAPEUTIC ACTIVITIES: CPT | Mod: GO

## 2018-09-24 PROCEDURE — 25000132 ZZH RX MED GY IP 250 OP 250 PS 637: Performed by: PHYSICIAN ASSISTANT

## 2018-09-24 PROCEDURE — 93306 TTE W/DOPPLER COMPLETE: CPT

## 2018-09-24 RX ADMIN — Medication 1 PACKET: at 20:50

## 2018-09-24 RX ADMIN — Medication 5 MG: at 20:45

## 2018-09-24 RX ADMIN — POLYETHYLENE GLYCOL 3350 17 G: 17 POWDER, FOR SOLUTION ORAL at 08:40

## 2018-09-24 RX ADMIN — LISINOPRIL AND HYDROCHLOROTHIAZIDE 1 TABLET: 12.5; 1 TABLET ORAL at 08:39

## 2018-09-24 RX ADMIN — WHITE PETROLATUM: 1.75 OINTMENT TOPICAL at 17:05

## 2018-09-24 RX ADMIN — ACETAMINOPHEN 975 MG: 325 TABLET, FILM COATED ORAL at 20:45

## 2018-09-24 RX ADMIN — HEPARIN SODIUM 5000 UNITS: 5000 INJECTION, SOLUTION INTRAVENOUS; SUBCUTANEOUS at 09:02

## 2018-09-24 RX ADMIN — SENNOSIDES AND DOCUSATE SODIUM 2 TABLET: 8.6; 5 TABLET ORAL at 08:39

## 2018-09-24 RX ADMIN — Medication 1 PACKET: at 13:39

## 2018-09-24 RX ADMIN — ASPIRIN 325 MG ORAL TABLET 325 MG: 325 PILL ORAL at 08:39

## 2018-09-24 RX ADMIN — CHLORHEXIDINE GLUCONATE 0.12% ORAL RINSE 15 ML: 1.2 LIQUID ORAL at 08:39

## 2018-09-24 RX ADMIN — HEPARIN SODIUM 5000 UNITS: 5000 INJECTION, SOLUTION INTRAVENOUS; SUBCUTANEOUS at 01:48

## 2018-09-24 RX ADMIN — WHITE PETROLATUM: 1.75 OINTMENT TOPICAL at 09:06

## 2018-09-24 RX ADMIN — Medication 1 PACKET: at 08:40

## 2018-09-24 RX ADMIN — CHLORHEXIDINE GLUCONATE 0.12% ORAL RINSE 15 ML: 1.2 LIQUID ORAL at 00:27

## 2018-09-24 RX ADMIN — ACETAMINOPHEN 975 MG: 325 TABLET, FILM COATED ORAL at 13:38

## 2018-09-24 RX ADMIN — CHLORHEXIDINE GLUCONATE 0.12% ORAL RINSE 15 ML: 1.2 LIQUID ORAL at 17:05

## 2018-09-24 RX ADMIN — MULTIVITAMIN 15 ML: LIQUID ORAL at 08:40

## 2018-09-24 RX ADMIN — SENNOSIDES AND DOCUSATE SODIUM 2 TABLET: 8.6; 5 TABLET ORAL at 20:45

## 2018-09-24 RX ADMIN — SIMVASTATIN 40 MG: 20 TABLET, FILM COATED ORAL at 20:45

## 2018-09-24 RX ADMIN — ACETAMINOPHEN 975 MG: 325 TABLET, FILM COATED ORAL at 06:19

## 2018-09-24 RX ADMIN — WHITE PETROLATUM: 1.75 OINTMENT TOPICAL at 00:27

## 2018-09-24 RX ADMIN — HEPARIN SODIUM 5000 UNITS: 5000 INJECTION, SOLUTION INTRAVENOUS; SUBCUTANEOUS at 17:15

## 2018-09-24 ASSESSMENT — ACTIVITIES OF DAILY LIVING (ADL)
ADLS_ACUITY_SCORE: 9

## 2018-09-24 NOTE — PLAN OF CARE
Problem: Patient Care Overview  Goal: Plan of Care/Patient Progress Review  Discharge Planner OT   Patient plan for discharge: home with A from family prn  Current status: Pt performed all mobility, transfers and ambulation with SBA. Pt ambulated in hallway, around unit x ~800 ft, steady pace, no LOB. Completed seated BUE/LE endurance building activity with demo and min cues-tolerated well.  Pt completes standing ADLs and g/h tasks with SBA/ind. Provided education on EC/WS and pacing strategies for fatigue management in relation to performing ADLs-pt able to provide several examples for home carryover.   Barriers to return to prior living situation: post surgical precautions  Recommendations for discharge: Per plan established by the OT, the recommendation for dc location is home with A from family prn  Rationale for recommendations: anticipate pt will be safe to complete all ADLs with A from family prn

## 2018-09-24 NOTE — PROGRESS NOTES
"Otolaryngology Progress Note  September 24, 2018     S: Ms Burden had an asymptomatic run of atrial fibrillation with RVR last night that resolved without intervention. Transferred to  for cardiac telemetry. She denies any chest pain or shortness of breath. Feels well, hoping to go home in next couple days.    O: /57 (BP Location: Right arm)  Pulse 93  Temp 97.1  F (36.2  C) (Axillary)  Resp 18  Ht 1.651 m (5' 5\")  Wt 100 kg (220 lb 6.4 oz)  SpO2 97%  BMI 36.68 kg/m2   General: resting comfortably in bed; not in acute distress   Neuro: alert, oriented, and answering questions appropriately   HEENT: Oral flap soft, warm, pale with strong implantable doppler signal. No signs of flap congestion or dehiscenence. Lateral rhinotomy incision clean, dry, intact. Some oozing from right nasal cavity. Neck incision clean, dry, intact. Neck soft and flat without evidence of hematoma or fluid collection. Penrose in place. NG in place.   Pulmonary: Breathing comfortably on room air, no stridor or difficulty breathing   Extremities: Left arm dressed with splint. Fingers with intact sensation and good capillary refill. Wound vac holding suction. BRYAN drains x 1 in place and holding suction with serosanguinous drainage in bulbs.     Intake/Output Summary (Last 24 hours) at 09/24/18 0837  Last data filed at 09/24/18 0600   Gross per 24 hour   Intake             1050 ml   Output             2260 ml   Net            -1210 ml     BRYAN drain output(s): (last 24 hours)/(last shift)  Left arm: 10, 17, 10 = 37cc    LABS:  ROUTINE IP LABS (Last four results)  BMP    Recent Labs  Lab 09/23/18  2356 09/23/18  0325 09/22/18  1618 09/22/18  0547 09/21/18  0256    140  --  140 140   POTASSIUM 3.8 3.4 4.3 3.3* 3.9   CHLORIDE 107 106  --  106 107   SAUNDRA 9.4 8.7  --  8.9 8.2*   CO2 25 26  --  25 23   BUN 14 10  --  8 12   CR 0.62 0.63  --  0.61 0.65   * 118*  --  111* 120*     CBC    Recent Labs  Lab 09/23/18  8682 " 09/23/18  0325 09/22/18  0547 09/21/18  0256   WBC 7.9 7.6 9.2 10.1   RBC 3.27* 3.00* 3.00* 2.71*   HGB 10.6* 9.7* 9.6* 8.8*   HCT 32.0* 29.4* 30.0* 26.3*   MCV 98 98 100 97   MCH 32.4 32.3 32.0 32.5   MCHC 33.1 33.0 32.0 33.5   RDW 13.8 14.0 14.2 14.0    259 242 197     INR    Recent Labs  Lab 09/23/18  0325 09/22/18  0547 09/21/18  0256 09/20/18  1743   INR 1.00 1.10 1.17* 1.11       A/P: Martita Burden is a 62-year-old female with a PMH significant for hypertension, allergic rhinitis, and 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 She is POD#4 s/p right inferior maxillectomy with left radial forearm free flap reconstruction and split thickness skin graft.     Neuro:  - Pain control: tylenol, dilaudid and oxycodone prn  - Melatonin qhs    HEENT:  - Nothing around the neck (no gown ties, trach ties, lines, etc.)  - RN flap checks Q4H  - ENT flap checks Q8H  - Clean incisions with 0.9% sodium chloride and apply Aquaphor Q8H  - Aquaphor to lips QID  - Monitor and record BRYAN drain output Qshift  - Peridex oral rinses QID  - Saline oral rinses Q8H and PRN. Gentle suction with red jade catheter only (no yankeur), do not cut red jade  - Flap ppx- ASA 325mg qAM  - Nasal sling PRN for nasal drainage  - Left radial forearm: Wound vac and plaster splint, BRYAN x 1. Weightbearing as tolerated, keep wrist in neutral position. Wound vac down and dressing change on POD5.  - STSG: Calcium alginate/tegarderm dressing. Replace/reinforce PRN while wound is draining. Once wound is no longer draining the wound can be left open to air, applying Aquaphor to keep moist.    Respiratory:  - Saturating well on room air  - Supplemental O2 to maintain saturations > 92%    CV/heme:  - NO vasopressors  - MAP goal >60, please contact ENT before giving any fluid boluses, recommend albumin or blood before giving any crystalloids  - Hemodynamically stable- P and BP wnl  - Hgb stable  - Hx of HTN: resume home lisinopril-HCTZ,  simvastatin  - New onset paroxysmal atrial fibrillation   - Cardiology consult, appreciate recs   - ECHO, TSH pending    FEN/GI:  - NPO  - Nutrition consult for TF recommendations- TF to goal of 40 ml/hr, transition to bolus today  - Multivitamins; prosource  - Bowel regimen: senna-docusate, added miralax  - PLC for TF teaching 9/25 @ 0900    :  - Voiding independently    Endo  - No active issues    ID:  - Perioperative antibiotics completed  - Afebrile  - No signs or symptoms of infection    PPX:  - Protonix  - SQ heparin 5,000 units Q8H  - SCDs    Dispo: Transfer to  today if able      -- Patient and above plan to be discussed with staff, Dr. Garza and Dr. Matt Baker MD  Otolaryngology- Head and Neck Surgery, PGY-1  Please contact ENT with questions by dialing * [phone] and entering job code [phone] when prompted.

## 2018-09-24 NOTE — PROGRESS NOTES
Brief ENT progress note    Discussed patient with cardiology fellow on call. He states he reviewed the chart and there is nothing to do immediately since patient appears to be going in and out of afib with RVR, and he would like the consult order to be placed as routine. He stated he would place a small note with his current recommendations and they would see the patient in the AM.    Edward Stephenson PGY2  Otolaryngology

## 2018-09-24 NOTE — PLAN OF CARE
Problem: Patient Care Overview  Goal: Plan of Care/Patient Progress Review  Outcome: Improving  D/I/A: Patient A & O X 4, VSS no respiratory distress noted and denies pain.telemetry NSR . No acute changes during day. Flap warm, soft, perfused. CMS intact in LUE. Left thigh, dressing intact BRYAN and wound VAC in place. Patient NPO , NG in place tube feeding was running st 40 ml/hr per order. Patient has  order for Bolus QID. Tube feeding has to be off for 2hr piror to starting Bolus feeding see for order . Tube feeding off at 1300. Patient up to BSC with SBA , void adequate. Passing gas no bowel movement on this shift. Has order to transfer to 6A/6B when beds available. Call light in reach , hourly round completed.  Continue monitor closely and update MD with concerns.

## 2018-09-24 NOTE — PROGRESS NOTES
Pt transferred on wheel chair to  at 1630. All belongings, paperwork, and medications sent with pt. Report given to 6B nurse by previous nurse.

## 2018-09-24 NOTE — PLAN OF CARE
Problem: Patient Care Overview  Goal: Plan of Care/Patient Progress Review  Outcome: No Change  Pt arrived on unit around 1830 from 4A. 4A nurse brought pt up via wheelchair, pt ind ambulated to bed. Pt was oriented to unit. Reviewed pt's drains and incisions w/ 4A nurse. Pt has wound vac and lg BRYAN on L forearm. Penrose drain present on R side of neck. NG feeding tube in place, tube feeding was @ 30 mL/hr. Flap inside of upper, right mouth was soft. Sutures on neck and face w/ no visible drainage. L thigh graft dressing c/d/i. 4A nurse had changed dressing at 1400 prior to transfer. Implanted doppler in neck in place. When arrived on unit, AVSS on RA. Infrequent cough present, nonproductive. LS clear. Upon assessment, pt denied pain. NPO w/ tube feeding increased @ 2200 to 40 mL/hr, denies nausea. Small amt of clear nasal drainage. Voiding spont w/ SBA to bathroom. Passing gas, no BM. No changes through shift to drains or incisions.     Charge nurse called RR due to abnormal VS at end of evening shift. See previous NURIA Valdes note.

## 2018-09-24 NOTE — PLAN OF CARE
Problem: Surgery Nonspecified (Adult)  Goal: Signs and Symptoms of Listed Potential Problems Will be Absent, Minimized or Managed (Surgery Nonspecified)  Signs and symptoms of listed potential problems will be absent, minimized or managed by discharge/transition of care (reference Surgery Nonspecified (Adult) CPG).   Shift: 4050-1754  Neuro: A&Ox4, intact.   Cardiac: Afebrile, VSS, SR.    Respiratory: sating mid 90s on RA, denies SOB.   GI/: Voiding in BSC. No BM this shift.   Diet/appetite: NPO with TF infusing at 40ml/hr which is goal rate. Denies nausea   Activity: SBA.   Pain: Denies.   Skin: Face is edematous from surgery, R neck incision CDI, midline incision between nose and top lip CDI. L leg dressing CDI. R flap on palate- pink, intact.   Lines: R PIV SL'd.   Drains: L Wound vac with no output, L BRYAN drain with scant serosang output.     Pt went into an episode of A Fib with RVR right before shift change. Pt converted back to NSR without interventions. L forearm wrapped from procedure. L BRYAN and wound vac. R arterial doppler implanted on neck, + doppler sounds. Pt cannot have anything around neck. Calls appropriately and makes needs known. Transfer orders were placed so patient could be monitored on tele. RN called to give report to 5B NURIA Clarke. Pt was transferred around 0300.

## 2018-09-24 NOTE — PROGRESS NOTES
"Otolaryngology Free Flap Note  Virtual Check: 2300    Subjective:  Patient developed afib with RVR. ENT paged immediately and came to assess. Patient asymptomatic with no concerns. Labs were drawn. Metoprolol 5mg ordered. Stat cardiology consult ordered. Patient will likely need transfer to telemetry but will await cards recommendations for remote vs continuous.    Objective:  /68  Pulse 103  Temp 96.9  F (36.1  C) (Axillary)  Resp 18  Ht 1.65 m (5' 4.96\")  Wt 99.3 kg (218 lb 14.7 oz)  SpO2 95%  BMI 36.47 kg/m2  Intraoral flap is soft, warm, and pale. Appears viable without evidence of congestion, there is no hematoma visualized. Arterial signals are strong and appropriate on implantable doppler. Right neck incision is well-approximated and without drainage or bleeding. The infra-incisional area continues to be slightly swollen. The area is slightly tense, no pain to palpation over the area, skin appears normal/pink, no increased drain output.  Left arm is wrapped. Good capillary refill. Sensation intact to light touch.   Leg dressing is intact over STSG donor site with no concerns    Assessment & Plan: Flap is stable new onset afib with RVR.   - Will await cardiology recommendations  - Continue plan of care  - Next otolaryngology flap check in the morning  - Call otolaryngology on-call with questions or concerns    Patient discussed and seen with Chief resident on call    Edward Stephenson MD  Otolaryngology- Head and Neck Surgery, PGY-2  Please contact ENT with questions by dialing * [phone] and entering job code [phone] when prompted.     "

## 2018-09-24 NOTE — PROVIDER NOTIFICATION
09/24/18 0000   Call Information   Date of Call 09/23/18   Time of Call 2242   Name of person requesting the team Carlotta   Title of person requesting team RN   RRT Arrival time 2244   Time RRT ended 0130   Reason for call   Type of RRT Adult   Primary reason for call Cardiovascular   Cardiovascular EKG changes   Was patient transferred from the ED, ICU, or PACU within last 24 hours prior to RRT call? Yes   SBAR   Situation Pt with new tachycardia 100-150s, asymptomatic   Background Pt here for ENT surgery, no cardiac hx, no hx of AFib   Notable History/Conditions Cancer;Hypertension   Assessment Pt A&O x 4, asymptmatic, VSS except tachycardia   Interventions ECG;Labs;Meds   Adjustments to Recommend Lytes, Metoprolol if persistent high rate; transfer to floor with cardiac monitoring   Patient Outcome   Patient Outcome Transferred to  (5B for medical telemetry)   RRT Team   Attending/Primary/Covering Physician Edward Stephenson   Date Attending Physician notified 09/24/18   Time Attending Physician notified 2245   Lead RN Dorene Lockett/Verónica Lester   Post RRT Intervention Assessment   Post RRT Assessment Stable/Improved   Date Follow Up Done 09/24/18   Time Follow Up Done 0307   Comments Pt has remained in Sinus rhythm since her initial espideo of Afib with RVR that lasted for approx 1 hours.  Pt converted to SR without intervention; Transferred to 5B for medical telemeetry, 5B to notify RRT if pt ahs epsideos of Afib

## 2018-09-24 NOTE — PROVIDER NOTIFICATION
Paged Edward Stephenson at [phone]. Pt has irregular HR. HR ranging from low 100s-150s. Rapid response called. Denies SOB or chest pain. OVSS on room air.

## 2018-09-24 NOTE — SUMMARY OF CARE
Pt just got here at 5B from 7c. Her belonging are yazmin, shirt, bra, underwear, socks, shoes, cell phone and .

## 2018-09-24 NOTE — PROGRESS NOTES
CLINICAL NUTRITION SERVICES - REASSESSMENT NOTE     Nutrition Prescription    Recommendations already ordered by Registered Dietitian (RD):  Access: NG tube  Dosing wt: 66 kg adjusted wt  TF: Isosource 1.5     Updated order set for the following: Once tolerating continuous goal TF for at least 8 hours and approp to transition to [place] Bolus regimen, recommend the following:     --Stop continuous TF for 1-2 hrs to let stomach empty prior to starting gravity feeding.    --Begin 1st gravity feeding @ 125 mL (1/2 can ) x 2 feedings (separate 3-4 hrs apart).     -- If tolerated, increase to Goal volume of 1 can ( 250 ml) x 4 feedings per day.      --Flush with 90  mL of H20 before and after each feeding if TF to provide full hydration. Provides additional 720 ml free water daily.    --Do not give feedings at night while pt asleep (during the day only).    --Regimen: Isosource 1.5 @ 1 can (250 ml) QID provides: 1000 ml/day, 1500 kcals (23+ kcal/kg), 68 gm PRO (1+ gm/kg), 760 ml H2O, 176 gm CHO and 15 gm Fiber daily.    --Continue 1 packet Prosource TID to provide an additional 120 kcal and 33 gm PRO to increase total provisions to 1620 kcal (25 kcal/kg) and 101 gm PRO (1.5 gm/kg)       NEW FINDINGS   Per discussion with team /CC, patient may discharge home tomorrow, would like to transition to Bolus TF regimen today:    PMH significant for Clear Cell carcinoma of the right maxilla. She is POD#4 s/p right inferior maxillectomy with left radial forearm free flap reconstruction and split thickness skin graft.     Labs noted: Lytes within normal range.     Gilbert Wong RD/DAMIEN  Pager [phone]

## 2018-09-24 NOTE — PLAN OF CARE
Problem: Patient Care Overview  Goal: Plan of Care/Patient Progress Review  Outcome: Improving  Pt A/O, VSS. Pt up with SBA to bedside commode. TF infusing at 40ml/hr, goal rate. NPO, meds crushed and given via NG tube. Left arm flap and left leg incisions covered, no evidence of drainage. Wound vac to left arm, internal doppler to right neck, two bulb suctions on right side. Soft palate flap pink, soft. Pt denies pain. Telemetry NSR. Will continue to monitor and alert MDs to any changes.

## 2018-09-24 NOTE — PROVIDER NOTIFICATION
09/24/18 0000   Call Information   Date of Call 09/23/18   Time of Call 2242   Name of person requesting the team Carlotta   Title of person requesting team RN   RRT Arrival time 2244   Time RRT ended 0130   Reason for call   Type of RRT Adult   Primary reason for call Cardiovascular   Cardiovascular EKG changes   Was patient transferred from the ED, ICU, or PACU within last 24 hours prior to RRT call? Yes   SBAR   Situation Pt with new tachycardia 100-150s, asymptomatic   Background Pt here for ENT surgery, no cardiac hx, no hx of AFib   Notable History/Conditions Cancer;Hypertension   Assessment Pt A&O x 4, asymptmatic, VSS except tachycardia   Interventions ECG;Labs;Meds   Adjustments to Recommend Lytes, Metoprolol if persistent high rate; transfer to floor with cardiac monitoring   Patient Outcome   Patient Outcome Transferred to  (5B for medical telemetry)   RRT Team   Attending/Primary/Covering Physician Edward Stephenson   Date Attending Physician notified 09/24/18   Time Attending Physician notified 2245   Lead RN Dorene Lockett/Verónica Lester   Post RRT Intervention Assessment   Post RRT Assessment Stable/Improved   Date Follow Up Done 09/24/18   Time Follow Up Done 0307   Comments Pt has remained in Sinus rhythm since her initial episode of Afib with RVR that lasted for approx 1 hours.  Pt converted to SR without intervention; Transferred to 5B for medical telemetry, 5B to notify RRT if pt ahs episodes of Afib

## 2018-09-24 NOTE — PROGRESS NOTES
RRT called for new onset tachycardia.  Per bedside RN, heart rate 100-150s, BP WNLs/baseline;  Pt asymptomatic;  12 lead done showing AFib with RVR.  Placed on portable monitor, pt in/out of AFib 's.  Metoprolol ordered, but pt converted into SR/Sinus tach just after 2330, prior to giving Metoprolol.  Pt has remained in SR/ST for past hour;  ENT aware of all above information.  Cards consult ordered by primary team (ENT)- awiating recommendations and plan to transfer to Med/Tele vs cardiac tele floor pending Cards recommendations.  Electrolytes drawn (K+ 3.8 Mg+ 2.1, no replacement under current ordered electrolyte protocol).  On portable monitor at bedside until transfer

## 2018-09-24 NOTE — PROGRESS NOTES
Cardiology Daily Progress Note   09/24/2018    Martita Burden MRN# [id number]   Age: 62 year old YOB: 1955        Subjective:   No acute events o/n. No recurrence of A-fib since the conversion around 1 AM.  She denied chest pain, SOB or palpitation.     4 point ROS negative except as noted above.   Physical Exam:   Ranges for vital signs:    Temp:  [95.9  F (35.5  C)-97.1  F (36.2  C)] 96  F (35.6  C)  Pulse:  [] 93  Heart Rate:  [] 76  Resp:  [15-18] 18  BP: (116-150)/(57-87) 126/57  SpO2:  [93 %-99 %] 94 %    Intake/Output Summary (Last 24 hours) at 09/24/18 1252  Last data filed at 09/24/18 0800   Gross per 24 hour   Intake              980 ml   Output             1585 ml   Net             -605 ml       Exam:  GEN: Pleasant, appears comfortable, NAD.  CV:  Heart is with regular rate/rhythm. No murmurs,   No JVD. No lower extremities edema. Peripheral pulses regular and full  LUNGS:  Clear to auscultation bilaterally   NEURO:  AAO x3     Labs:     CMP  Recent Labs  Lab 09/23/18 2356 09/23/18 0325 09/22/18  1618 09/22/18  0547 09/21/18  0256    140  --  140 140   POTASSIUM 3.8 3.4 4.3 3.3* 3.9   CHLORIDE 107 106  --  106 107   CO2 25 26  --  25 23   ANIONGAP 9 8  --  9 10   * 118*  --  111* 120*   BUN 14 10  --  8 12   CR 0.62 0.63  --  0.61 0.65   GFRESTIMATED >90 >90  --  >90 >90   GFRESTBLACK >90 >90  --  >90 >90   SAUNDRA 9.4 8.7  --  8.9 8.2*   MAG 2.1 2.1  --  1.9 1.8   PHOS 3.2 3.1  --  2.3* 2.3*   ALBUMIN  --   --   --   --  3.2*     CBC  Recent Labs  Lab 09/23/18  2356 09/23/18  0325 09/22/18  0547 09/21/18  0256   WBC 7.9 7.6 9.2 10.1   RBC 3.27* 3.00* 3.00* 2.71*   HGB 10.6* 9.7* 9.6* 8.8*   HCT 32.0* 29.4* 30.0* 26.3*   MCV 98 98 100 97   MCH 32.4 32.3 32.0 32.5   MCHC 33.1 33.0 32.0 33.5   RDW 13.8 14.0 14.2 14.0    259 242 197     INR  Recent Labs  Lab 09/23/18  0325 09/22/18  0547 09/21/18  0256 09/20/18  1743   INR 1.00 1.10 1.17* 1.11     Arterial Blood  Gas  Recent Labs  Lab 09/21/18  0256 09/20/18  1743 09/20/18  1410 09/20/18  1118   PH 7.47* 7.32* 7.37 7.38   PCO2 34* 43 41 42   PO2 145* 159* 72* 72*   HCO3 25 22 23 25   O2PER 40.0 90 30.0 30.0       Medications reviewed      New Imaging:     ECHO 9/24/18  Left ventricular function, chamber size, wall motion, and wall thickness are  normal.The EF is 60-65%.  Normal right ventricular size and systolic function.  No valve disease.       Assessment and Plan:     Martita Burden is a 63 yo female s/p right maxillectomy, right neck exploration, right forearm flap, split thickness skin graft on 9/20/2018.  She had paroxysmal A. fib episode but converted into normal sinus rhythm without intervention. No signs of ACS or heart failure.     #Paroxysmal atrial fibrillation  [id number] Score is 2(gender, HTN) but it is very likely to be provoked by the recent ENT surgery. ECHO is normal without signs of volume overload. The patient is asymptomatic. Currently she is in sinus rhythm.  We would not start anticoagulation at this moment.  But if she frequently has A. fib episodes we would consider anticoagulation and HR / rhythm control strategies.      Recommendations  - If A-fib occurs, can give metoprolol for HR control  - If no recurrence of A-fib, no need for outpt ZioPatch monitoring upon discharge    Celestino Garrett MD  Cardiovascular Medicine Fellow, PGY-7  [phone]

## 2018-09-24 NOTE — PLAN OF CARE
Problem: Patient Care Overview  Goal: Plan of Care/Patient Progress Review  Patient received Bolus of feeding per order and flushed with water as ordered, and patient tolerated well. Patient has order to transfer to 6B, per primary team order. Writer given report to 6B RN and update  on coming RN to transfer patient to 6B. Continue carry on as order put in.

## 2018-09-24 NOTE — PROGRESS NOTES
Care Coordinator Progress Note    Admission Date/Time:  9/20/2018  Attending MD:  Leslie Garza MD    Data  Chart reviewed, discussed with interdisciplinary team.   Patient was admitted for: Data Unavailable.    Concerns with insurance coverage for discharge needs: None.  Current Living Situation: Patient lives with adult child.  Support System: Supportive and Involved  Services Involved: none prior to admission  Transportation at Discharge: Family or friend will provide- daughter will come down from [place] at time of discharge   Transportation to Medical Appointments:   - Name of caregiver: Daughter Leida  Barriers to Discharge: pending medical stability and clearance from ENT    Coordination of Care and Referrals: Provided patient/family with options for Home Care and Home Infusion.        Assessment  RNCC paged ENT to discuss pt discharge needs.  Per Edith pt will not discharge with the wound vac but may discharge with the 2 Nicolas drains depending on out pt of them.  Pt also will require wound care, TF, and RN coverage at home.  RNCC met with pt at bedside to discuss needs at discharge.  Pt agrees with referrals for HHC and HI, would like to stick with [place], so referral was sent to [facility] who will coordinate home care RN provider in [place].  Pt has appointment with [facility] for TF teaching, will need additional teaching at bedside with [facility] prior to discharge.  RNCC sent referral and placed orders in chart for HC and HI.  Per Edith with ENT pt will transition to bolus feedings today, RD is currently working on this with pt chart.  Per VANESSA pt will discontinue on Isosource 1 can 4 times daily.  Pt lives in a condo with her daughter who provides any assistance this pt needs.  Pt states Leida will pick her up from [facility] when she is medically cleared.  Per Edith CM pt may be medically ready for discontinue tomorrow or Wednesday.    Bedside RN to send 4 days of wound care supplies with this pt at time of discharge [facility]  RN will arrange DME needed for continued care at home.  ENT to place wound care orders on discharge papers.      [facility]  Phone  [phone]  Fax  [phone]  Phylicia: [phone]  Intake: [phone]      Plan  Anticipated Discharge Date:  9/25/18-9/26/18   Anticipated Discharge Plan:  HHC at discharge with [place] to remain in place.  Wound vac will be dc'd prior to discharge      Suzanne Tanner, NURIACC, BSN    [facility]  [address] [place] [id number]    [email or web address]    Office: [phone] Pager: [phone]  To contact weekend RNCC, dial [phone] and enter pager number [phone] at prompt. This pager can not be contacted by text page or outside line.

## 2018-09-24 NOTE — CONSULTS
CARDIOLOGY CONSULTATION  [facility], [place]    Assessment & Plan   Martita Burden is a 61 yo female s/p right maxillectomy, right neck exploration, right forearm flap, split thickness skin graft on 9/20/2018.  She had paroxysmal A. fib episode but converted into normal sinus rhythm.  No signs of ACS or heart failure.    #Paroxysmal atrial fibrillation  [id number] Score is 2(gender, HTN) but it is very likely to be provoked by the recent ENT surgery. The patient is asymptomatic. Currently she is in sinus rhythm.  We would not start anticoagulation at this moment.  But if she frequently has A. fib episodes we would consider anticoagulation and HR / rhythm control strategies.     Recommendations  - ECHO, TSH(both ordered for you)  - if afib occurs, can give metoprolol for HR control  - we may consider EKG monitor at discharge    The results and significance of today's testing was discussed with the patient in lay terms. More the 50% of time was for counseling and coordinating care.    Thank you for allowing us to participate in the care of your patient. Please do not hesitate to contact the cardiology consult service if you have any questions.     Cardiology will follow.    Patient will be seen officially in the morning.    Daniel Martin MD  Cardiology Fellow [id number]    Attending: Patient seen and examined with Dr. Vijay Ceballos. The history and physical findings are accurate as recorded. My additional findings, if any, have been incorporated into the body of the note. All labs, imaging studies, ECG and telemetry data have been reviewed personally. The assessment and recommendations outlined reflect our joint decision making.    Portions of the note were dictated using speech recognition software. The note has been reviewed but some errors may have been overlooked.    Lucas Diggs MD        History of Present Illness   Chief complaint: silviano Burden is a 61 yo female s/p right  maxillectomy, right neck exploration, right forearm free flap, split thickness skin graft on 9/20/2018.  She is with past medical history of hypertension on lisinopril.  However otherwise she is without cardiac disease history.  No CAD or stroke.  No heart failure.  Overnight the nurse noticed her heart rate is around 130s.  EKG obtained showed A. fib with RVR.  The patient is asymptomatic.  Hemodynamically stable.  The patient has never had A. fib.  Currently POD#4.  Rapid response was called.  Heart rhythm converted into sinus rhythm without any medications.  Currently her rhythm is normal sinus rhythm.  EKG showed no ST-T changes or any ACS sign.      EKG  NSR normal EKG    Past Medical History:   Diagnosis Date     Allergic rhinitis     Seasonal     Cancer (H) April     Hypertension 10 yrs ago     Past Surgical History:   Procedure Laterality Date     EXPLORE NECK Right 9/20/2018    Procedure: EXPLORE NECK;;  Surgeon: Leslie Garza MD;  Location: [facility] OR     GRAFT FREE VASCULARIZED (LOCATION) Right 9/20/2018    Procedure: GRAFT FREE VASCULARIZED (LOCATION);;  Surgeon: Terrence Gutierrez MD;  Location: [facility] OR     GRAFT SKIN SPLIT THICKNESS FROM EXTREMITY N/A 9/20/2018    Procedure: GRAFT SKIN SPLIT THICKNESS FROM EXTREMITY;;  Surgeon: Terrence Gutierrez MD;  Location: [facility] OR     HYSTERECTOMY       ORTHOPEDIC SURGERY  2/4/2017    Repaired broken ankle     OSTEOTOMY MAXILLA Right 9/20/2018    Procedure: OSTEOTOMY MAXILLA;  Right Maxillectomy, Right Neck Exploration, Left Forearm Free Flap, Split Thickness Skin Graft from Lower Extremity, Nasogastric Feeding Tube Placement;  Surgeon: Leslie Garza MD;  Location: [facility] OR     Social History     Social History     Marital status: Single     Spouse name: N/A     Number of children: N/A     Years of education: N/A     Occupational History     Not on file.     Social History Main Topics     Smoking status: Light Tobacco Smoker     Packs/day: 0.50     Years: 30.00      Types: Cigarettes     Smokeless tobacco: Never Used      Comment: currently down to 5 cigs a day     Alcohol use No     Drug use: No     Sexual activity: No     Other Topics Concern     Not on file     Social History Narrative       Family History   Problem Relation Age of Onset     Cancer Father      Cancer Brother      Hypertension Mother      Pretty much the whole family     Non contributory  Prior to Admission Medications   Prior to Admission Medications   Prescriptions Last Dose Informant Patient Reported? Taking?   Acetaminophen (TYLENOL PO) 9/19/2018 at 2000  Yes Yes   Sig: Take 1,000 mg by mouth as needed for mild pain or fever   Calcium-Magnesium-Zinc 333-133-5 MG TABS per tablet 9/19/2018 at 2000  Yes Yes   Sig: Take 1 tablet by mouth 2 times daily (with meals)    Flaxseed, Linseed, ([employer] FLAXSEED OIL) 1000 MG CAPS More than a month at Unknown time  Yes No   Sig: Take 1,000 mg by mouth 2 times daily ON HOLD FOR SURGERY SINCE 09/10/2018   aspirin 325 MG EC tablet More than a month at Unknown time  Yes No   Sig: Take 325 mg by mouth every morning ON HOLD FOR SURGERY SINCE 09/10/2018   lisinopril-hydrochlorothiazide (PRINZIDE/ZESTORETIC) 10-12.5 MG per tablet 9/19/2018 at 0800  Yes Yes   Sig: Take 1 tablet by mouth every morning    nicotine (NICODERM CQ) 14 MG/24HR 24 hr patch 9/20/2018 at Unknown time  Yes Yes   Sig: Place 1 patch onto the skin every 24 hours    simvastatin (ZOCOR) 40 MG tablet 9/19/2018 at 2000  Yes Yes   Sig: Take 40 mg by mouth every evening      Facility-Administered Medications: None       No current facility-administered medications on file prior to encounter.   Current Outpatient Prescriptions on File Prior to Encounter:  Calcium-Magnesium-Zinc 333-133-5 MG TABS per tablet Take 1 tablet by mouth 2 times daily (with meals)    lisinopril-hydrochlorothiazide (PRINZIDE/ZESTORETIC) 10-12.5 MG per tablet Take 1 tablet by mouth every morning    nicotine (NICODERM CQ) 14 MG/24HR 24 hr  patch Place 1 patch onto the skin every 24 hours    aspirin 325 MG EC tablet Take 325 mg by mouth every morning ON HOLD FOR SURGERY SINCE 09/10/2018   Flaxseed, Linseed, ([employer] FLAXSEED OIL) 1000 MG CAPS Take 1,000 mg by mouth 2 times daily ON HOLD FOR SURGERY SINCE 09/10/2018     Allergies   Allergies   Allergen Reactions     Penicillins      Sulfa Drugs      Review of Systems   10-point ROS reviewed & found negative w/ exceptions noted in the HPI.    Physical Exam   Temp: 96.9  F (36.1  C) Temp src: Axillary BP: 135/68 Pulse: 103 Heart Rate: 87 Resp: 18 SpO2: 95 % O2 Device: None (Room air)      GEN:  Alert, oriented x 3, appears comfortable, NAD.  CV:  Heart is with regular rate/rhythm. No murmurs,   No JVD. No lower extremities edema  LUNGS:  Clear to auscultation bilaterally   ABD:  Active bowel sounds, soft, non-tender/non-distended.   NEURO:  No new focal deficits appreciated.    Imaging Study  Data            Labs  Data       Metabolic Studies    Recent Labs  Lab 09/23/18 2356 09/23/18 0325 09/22/18  1618 09/22/18  0547 09/21/18  0256    140  --  140 140   POTASSIUM 3.8 3.4 4.3 3.3* 3.9   CHLORIDE 107 106  --  106 107   CO2 25 26  --  25 23   ANIONGAP 9 8  --  9 10   * 118*  --  111* 120*   BUN 14 10  --  8 12   CR 0.62 0.63  --  0.61 0.65   GFRESTIMATED >90 >90  --  >90 >90   GFRESTBLACK >90 >90  --  >90 >90   SAUNDRA 9.4 8.7  --  8.9 8.2*   MAG 2.1 2.1  --  1.9 1.8   PHOS 3.2 3.1  --  2.3* 2.3*   ALBUMIN  --   --   --   --  3.2*     Hematology  Recent Labs  Lab 09/23/18 2356 09/23/18 0325 09/22/18  0547 09/21/18  0256   WBC 7.9 7.6 9.2 10.1   RBC 3.27* 3.00* 3.00* 2.71*   HGB 10.6* 9.7* 9.6* 8.8*   HCT 32.0* 29.4* 30.0* 26.3*   MCV 98 98 100 97   MCH 32.4 32.3 32.0 32.5   MCHC 33.1 33.0 32.0 33.5   RDW 13.8 14.0 14.2 14.0    259 242 197     INR  Recent Labs  Lab 09/23/18  0325 09/22/18  0547 09/21/18  0256 09/20/18  1743 09/17/18  1216   INR 1.00 1.10 1.17* 1.11 0.94       TroponinNo lab  results found in last 7 days.           Daniel Martin MD  Cardiology Fellow [id number]

## 2018-09-25 ENCOUNTER — HOME INFUSION (PRE-WILLOW HOME INFUSION) (OUTPATIENT)
Dept: PHARMACY | Facility: CLINIC | Age: 63
End: 2018-09-25

## 2018-09-25 LAB
ANION GAP SERPL CALCULATED.3IONS-SCNC: 7 MMOL/L (ref 3–14)
BUN SERPL-MCNC: 17 MG/DL (ref 7–30)
CALCIUM SERPL-MCNC: 8.9 MG/DL (ref 8.5–10.1)
CHLORIDE SERPL-SCNC: 107 MMOL/L (ref 94–109)
CO2 SERPL-SCNC: 26 MMOL/L (ref 20–32)
CREAT SERPL-MCNC: 0.66 MG/DL (ref 0.52–1.04)
ERYTHROCYTE [DISTWIDTH] IN BLOOD BY AUTOMATED COUNT: 13.9 % (ref 10–15)
GFR SERPL CREATININE-BSD FRML MDRD: >90 ML/MIN/1.7M2
GLUCOSE SERPL-MCNC: 101 MG/DL (ref 70–99)
HCT VFR BLD AUTO: 32.3 % (ref 35–47)
HGB BLD-MCNC: 10.3 G/DL (ref 11.7–15.7)
MCH RBC QN AUTO: 31.4 PG (ref 26.5–33)
MCHC RBC AUTO-ENTMCNC: 31.9 G/DL (ref 31.5–36.5)
MCV RBC AUTO: 99 FL (ref 78–100)
PLATELET # BLD AUTO: 328 10E9/L (ref 150–450)
POTASSIUM SERPL-SCNC: 3.6 MMOL/L (ref 3.4–5.3)
RBC # BLD AUTO: 3.28 10E12/L (ref 3.8–5.2)
SODIUM SERPL-SCNC: 140 MMOL/L (ref 133–144)
TSH SERPL DL<=0.005 MIU/L-ACNC: 3.05 MU/L (ref 0.4–4)
WBC # BLD AUTO: 7.1 10E9/L (ref 4–11)

## 2018-09-25 PROCEDURE — 99232 SBSQ HOSP IP/OBS MODERATE 35: CPT | Mod: 25 | Performed by: INTERNAL MEDICINE

## 2018-09-25 PROCEDURE — 12000006 ZZH R&B IMCU INTERMEDIATE UMMC

## 2018-09-25 PROCEDURE — 25000132 ZZH RX MED GY IP 250 OP 250 PS 637: Performed by: STUDENT IN AN ORGANIZED HEALTH CARE EDUCATION/TRAINING PROGRAM

## 2018-09-25 PROCEDURE — E2402 NEG PRESS WOUND THERAPY PUMP: HCPCS

## 2018-09-25 PROCEDURE — 80048 BASIC METABOLIC PNL TOTAL CA: CPT | Performed by: PHYSICIAN ASSISTANT

## 2018-09-25 PROCEDURE — 25000128 H RX IP 250 OP 636: Performed by: STUDENT IN AN ORGANIZED HEALTH CARE EDUCATION/TRAINING PROGRAM

## 2018-09-25 PROCEDURE — 93010 ELECTROCARDIOGRAM REPORT: CPT | Performed by: INTERNAL MEDICINE

## 2018-09-25 PROCEDURE — 84443 ASSAY THYROID STIM HORMONE: CPT | Performed by: PHYSICIAN ASSISTANT

## 2018-09-25 PROCEDURE — 25000132 ZZH RX MED GY IP 250 OP 250 PS 637: Performed by: PHYSICIAN ASSISTANT

## 2018-09-25 PROCEDURE — 25000125 ZZHC RX 250: Performed by: STUDENT IN AN ORGANIZED HEALTH CARE EDUCATION/TRAINING PROGRAM

## 2018-09-25 PROCEDURE — 85027 COMPLETE CBC AUTOMATED: CPT | Performed by: PHYSICIAN ASSISTANT

## 2018-09-25 PROCEDURE — 36415 COLL VENOUS BLD VENIPUNCTURE: CPT | Performed by: PHYSICIAN ASSISTANT

## 2018-09-25 PROCEDURE — 93005 ELECTROCARDIOGRAM TRACING: CPT

## 2018-09-25 RX ORDER — MINERAL OIL/HYDROPHIL PETROLAT
OINTMENT (GRAM) TOPICAL EVERY 8 HOURS
Qty: 50 G | Refills: 1 | Status: SHIPPED | OUTPATIENT
Start: 2018-09-25 | End: 2018-10-23

## 2018-09-25 RX ORDER — AMOXICILLIN 250 MG
2 CAPSULE ORAL 2 TIMES DAILY PRN
Qty: 60 TABLET | Refills: 0 | Status: SHIPPED | OUTPATIENT
Start: 2018-09-25 | End: 2018-10-23

## 2018-09-25 RX ORDER — CHLORHEXIDINE GLUCONATE ORAL RINSE 1.2 MG/ML
15 SOLUTION DENTAL EVERY 8 HOURS
Qty: 473 ML | Refills: 0 | Status: SHIPPED | OUTPATIENT
Start: 2018-09-25 | End: 2018-10-08

## 2018-09-25 RX ORDER — OXYCODONE HYDROCHLORIDE 5 MG/1
5-10 TABLET ORAL
Qty: 45 TABLET | Refills: 0 | Status: SHIPPED | OUTPATIENT
Start: 2018-09-25 | End: 2018-10-23

## 2018-09-25 RX ADMIN — HEPARIN SODIUM 5000 UNITS: 5000 INJECTION, SOLUTION INTRAVENOUS; SUBCUTANEOUS at 10:18

## 2018-09-25 RX ADMIN — METOPROLOL TARTRATE 25 MG: 100 TABLET, FILM COATED ORAL at 21:00

## 2018-09-25 RX ADMIN — Medication 1 PACKET: at 10:29

## 2018-09-25 RX ADMIN — WHITE PETROLATUM: 1.75 OINTMENT TOPICAL at 00:56

## 2018-09-25 RX ADMIN — SIMVASTATIN 40 MG: 20 TABLET, FILM COATED ORAL at 21:00

## 2018-09-25 RX ADMIN — METOPROLOL TARTRATE 5 MG: 1 INJECTION, SOLUTION INTRAVENOUS at 12:26

## 2018-09-25 RX ADMIN — POLYETHYLENE GLYCOL 3350 17 G: 17 POWDER, FOR SOLUTION ORAL at 10:25

## 2018-09-25 RX ADMIN — CHLORHEXIDINE GLUCONATE 0.12% ORAL RINSE 15 ML: 1.2 LIQUID ORAL at 00:49

## 2018-09-25 RX ADMIN — MULTIVITAMIN 15 ML: LIQUID ORAL at 10:19

## 2018-09-25 RX ADMIN — WHITE PETROLATUM: 1.75 OINTMENT TOPICAL at 17:34

## 2018-09-25 RX ADMIN — CAFFEINE 200 MG: 200 TABLET ORAL at 10:19

## 2018-09-25 RX ADMIN — ACETAMINOPHEN 975 MG: 325 TABLET, FILM COATED ORAL at 03:18

## 2018-09-25 RX ADMIN — Medication 1 PACKET: at 14:10

## 2018-09-25 RX ADMIN — CHLORHEXIDINE GLUCONATE 0.12% ORAL RINSE 15 ML: 1.2 LIQUID ORAL at 17:33

## 2018-09-25 RX ADMIN — ACETAMINOPHEN 975 MG: 325 TABLET, FILM COATED ORAL at 21:00

## 2018-09-25 RX ADMIN — ASPIRIN 325 MG ORAL TABLET 325 MG: 325 PILL ORAL at 10:18

## 2018-09-25 RX ADMIN — HEPARIN SODIUM 5000 UNITS: 5000 INJECTION, SOLUTION INTRAVENOUS; SUBCUTANEOUS at 17:33

## 2018-09-25 RX ADMIN — LISINOPRIL AND HYDROCHLOROTHIAZIDE 1 TABLET: 12.5; 1 TABLET ORAL at 10:18

## 2018-09-25 RX ADMIN — Medication 5 MG: at 21:00

## 2018-09-25 RX ADMIN — HEPARIN SODIUM 5000 UNITS: 5000 INJECTION, SOLUTION INTRAVENOUS; SUBCUTANEOUS at 03:17

## 2018-09-25 RX ADMIN — ACETAMINOPHEN 975 MG: 325 TABLET, FILM COATED ORAL at 12:26

## 2018-09-25 RX ADMIN — CHLORHEXIDINE GLUCONATE 0.12% ORAL RINSE 15 ML: 1.2 LIQUID ORAL at 10:18

## 2018-09-25 RX ADMIN — WHITE PETROLATUM: 1.75 OINTMENT TOPICAL at 10:26

## 2018-09-25 RX ADMIN — METOPROLOL TARTRATE 5 MG: 1 INJECTION, SOLUTION INTRAVENOUS at 12:11

## 2018-09-25 ASSESSMENT — PAIN DESCRIPTION - DESCRIPTORS
DESCRIPTORS: ACHING
DESCRIPTORS: ACHING

## 2018-09-25 ASSESSMENT — ACTIVITIES OF DAILY LIVING (ADL)
ADLS_ACUITY_SCORE: 9

## 2018-09-25 NOTE — PROVIDER NOTIFICATION
Time of notification: 12:12 PM  Provider notified: María MÉNDEZ  Reason for Notification: Pt with heart rate/rhythm  Change from sinus rhythm to AFib with rates sustaining 140-160's (briefly spiked to 180's non-sustained).   Temp:  [94.8  F (34.9  C)-98.4  F (36.9  C)] 98.4  F (36.9  C)  Pulse:  [78-95] 95  Heart Rate:  [81-93] 93  Resp:  [18] 18  BP: (113-128)/(67-78) 128/72  SpO2:  [94 %-99 %] 96 %  Orders received: Confirmed Metoprolol 5mg IV okay to administer x2. EKG 12 lead ordered and obtained. Will continue to monitor.

## 2018-09-25 NOTE — PLAN OF CARE
Problem: Patient Care Overview  Goal: Plan of Care/Patient Progress Review  Outcome: Improving  Pt A&O X4, VSS, afebrile, HR sinus rhythm 80-90's, BP's 110-120's/60-70's, 02 sats > 90% on RA. LS clear/diminished, BS+ X4, CMS intact. Pt slept majority of overnight shift, had few requests/complaints, reported tolerable back pain, received scheduled Tylenol with relief. R neck/R jaw incision ANTHONY, connected to arterial doppler, upper R roof of mouth pink, moist, soft. L arm Ace wrapped, connected to wound vac set to 125mmHg suction, zero output, BRYAN in place with minimal serosanguinous drainage. L thigh skin graft site covered, no drainage out overnight. PIV in R arm patent & SL. NG to L nare in place, pt has been NPO with no nausea/emesis. Gets up with minimal SBA. Voiding urine adequately, passing gas, had one small formed brown BM overnight. Able to make needs known, call light within reach, will continue to monitor per POC.

## 2018-09-25 NOTE — PROGRESS NOTES
Brief Cardiology Note    No atrial fibrillation on telemetry overnight.   Cardiology will sign off.   Please do not hesitate to call with any further questions or concerns.     Celestino Garrett MD  Cardiovascular Medicine Fellow, PGY-7  [phone]     Patient went back into atrial fibrillation around midday with HR up to 160s. She was asymptomatic. Heart rate responded well to IV doses of metoprolol. When we rounded on her, she was back in sinus rhythm.     Recommend:  - 25mg metoprolol PO BID  - follow up in clinic here with Dr. Diggs when she come back for a wound check in one week (ordered for you); after that she can see him in [place] if needed    Patient and plan of care discussed with Dr. Castro.    Please do not hesitate to call with any further questions or concerns.  Celestino Garrett MD  Cardiovascular Medicine Fellow, PGY-7  [phone]     I very much appreciated the opportunity to see and assess Mrs Martita Burden In consultation in the hospital with CV Fellow Dr Vijay Garrett and Cardiology consultantation resident team.     I agree with the note above which summarizes my findings and current recommendations.  Please do not hesitate to contact my office if you have any questions or concerns.      Gatito Castro MD  Cardiac Arrhythmia Service  [facility]  [phone]

## 2018-09-25 NOTE — PROGRESS NOTES
ENT Free Flap Check  09/24/18 @2230    S: No issues with flap. Patient transferred to  this afternoon. Vital signs have been stable. Has not needed any metoprolol. Patient reports she is overall feeling well.     O: Temp: 94.8  F (34.9  C) Temp src: Axillary BP: 122/78 Pulse: 78 Heart Rate: 81 Resp: 18 SpO2: 97 % O2 Device: None (Room air)    Intraoral flap appears viable without evidence of congestion, no hematoma visualized, strong doppler sounds. All suture lines are intact and flap is warm and soft to touch. External incisions c/d/i. NG tube in place and secured in left naris.   Breathing comfortably on room air, no increased work of breathing  LUE with splint in place and wound vac holding good seal. BRYAN drain x 1 in place, serosanguinous drainage and holding bulb suction.   Neck flat    A/P: stable flap  -continue current plan of care, Q8h resident checks    Joy Montgomery MD PGY-3  Otolaryngology-Head & Neck Surgery  Pager: [phone]

## 2018-09-25 NOTE — PROGRESS NOTES
Home Infusion  Martita is expected to discontinue possibly tomorrow and will be going home on bolus enteral feeds via gravity.   She has done home TF before and has been to the [facility] for refresher teaching.  Met with Martita at bedside and provided her with information about I services.  Confirmed administration method and assessed for needs for discharge (including supplies and home nursing serivces).   According to Martita her drain will be removed before discharge and she will go home with her NG, feeds and need for ongoing wound assessments and nutritional f/u.   Informed her about supplies and delivery of supplies, storage of formula,  plan for SNV and 24/7 availability of [facility] staff while on IV therapy.   Home nursing services will be provided by [facility] ([phone]).  They are not able to see pt's on weekends so if patient is not home by Fri she will not have a home nurse visit until Mon.  Pt does not need home nurse for teaching on the TF as she is independent with that.  She plans to stay at her nieces home in [place] on the night of discharge before going home to [place] but has requested her TF formula and supplies be delivered to the hospital before she leaves on day of discharge.  Martita verbalized understanding of all information given.   She is willing and able to manage home enteral therapy.  Questions answered.  Will continue to follow until dc and update pt with details once final orders are signed.    [facility] Liaison  [phone] pager

## 2018-09-25 NOTE — PROGRESS NOTES
"Otolaryngology Progress Note  September 25, 2018     S: No acute events overnight. NSR on the monitor overnight, no further episodes of atrial fibrillation. Reports she is doing well this morning, excited to discharge home. She feels comfortable doing her tube feeding. PLC scheduled this morning.     O: /69 (BP Location: Right arm)  Pulse 90  Temp 97.5  F (36.4  C) (Axillary)  Resp 18  Ht 1.651 m (5' 5\")  Wt 93.5 kg (206 lb 2.1 oz)  SpO2 95%  BMI 34.3 kg/m2   General: resting comfortably in bed; not in acute distress   Neuro: alert, oriented, and answering questions appropriately   HEENT: Oral flap soft, warm, pale with strong implantable doppler signal. No signs of flap congestion or dehiscenence. Lateral rhinotomy incision clean, dry, intact. Neck incision clean, dry, intact. Neck soft and flat without evidence of hematoma or fluid collection. Vessel loop drain in place. NG in place.   Pulmonary: Breathing comfortably on room air, no stridor or difficulty breathing   Extremities: Left arm dressed with splint. Fingers with intact sensation and good capillary refill. Wound vac holding suction. BRYAN drains x 1 in place and holding suction with serosanguinous drainage in bulbs.     Intake/Output Summary (Last 24 hours) at 09/25/18 0801  Last data filed at 09/25/18 0749   Gross per 24 hour   Intake              970 ml   Output              770 ml   Net              200 ml     BRYAN drain output(s): (last 24 hours)/(last shift)  Left arm: NR, 15, 5 = 20cc    LABS:  ROUTINE IP LABS (Last four results)  BMP    Recent Labs  Lab 09/25/18  0438 09/24/18  1307 09/23/18  2356 09/23/18  0325    141 141 140   POTASSIUM 3.6 4.1 3.8 3.4   CHLORIDE 107 106 107 106   SAUNDRA 8.9 9.8 9.4 8.7   CO2 26 26 25 26   BUN 17 17 14 10   CR 0.66 0.62 0.62 0.63   * 118* 133* 118*     CBC    Recent Labs  Lab 09/25/18  0438 09/24/18  1307 09/23/18  2356 09/23/18  0325   WBC 7.1 7.5 7.9 7.6   RBC 3.28* 3.33* 3.27* 3.00*   HGB " 10.3* 10.8* 10.6* 9.7*   HCT 32.3* 33.2* 32.0* 29.4*   MCV 99 100 98 98   MCH 31.4 32.4 32.4 32.3   MCHC 31.9 32.5 33.1 33.0   RDW 13.9 13.8 13.8 14.0    309 259 259     INR    Recent Labs  Lab 09/23/18  0325 09/22/18  0547 09/21/18  0256 09/20/18  1743   INR 1.00 1.10 1.17* 1.11       A/P: Martita Burden is a 62-year-old female with a PMH significant for hypertension, allergic rhinitis, and 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 She is POD#5 s/p right inferior maxillectomy with left radial forearm free flap reconstruction and split thickness skin graft.     Neuro:  - Pain control: tylenol, dilaudid and oxycodone prn  - Melatonin qhs    HEENT:  - Nothing around the neck (no gown ties, trach ties, lines, etc.)  - RN flap checks Q4H  - ENT flap checks Q8H  - Clean incisions with 0.9% sodium chloride and apply Aquaphor Q8H  - Aquaphor to lips QID  - Monitor and record BRYAN drain output Qshift  - Peridex oral rinses QID  - Saline oral rinses Q8H and PRN. Gentle suction with red jade catheter only (no yankeur), do not cut red jade  - Flap ppx- ASA 325mg qAM  - Nasal sling PRN for nasal drainage  - Left radial forearm: Wound vac and plaster splint, BRYAN x 1. Weightbearing as tolerated, keep wrist in neutral position. Wound vac down and dressing change today, daily thereafter  - STSG: Calcium alginate/tegarderm dressing. Replace/reinforce PRN while wound is draining. Once wound is no longer draining the wound can be left open to air, applying Aquaphor to keep moist.    Respiratory:  - Saturating well on room air  - Supplemental O2 to maintain saturations > 92%    CV/heme:  - NO vasopressors  - MAP goal >60  - Hemodynamically stable- P and BP wnl  - Hgb stable  - Hx of HTN: resume home lisinopril-HCTZ, simvastatin  - New onset paroxysmal atrial fibrillation post-op   - Cardiology consulted   - ECHO grossly normal, TSH normal    - Continuous cardiac monitoring. If no further episodes of afib no monitoring or  anticoagulation recommended    FEN/GI:  - NPO  - Non-severe malnutrition in the context of acute illness: Bolus TFs via NG  - Multivitamins; prosource  - Bowel regimen: senna-docusate, added miralax  - PLC for TF teaching 9/25 @ 0900    :  - Voiding independently    Endo  - No active issues    ID:  - Perioperative antibiotics completed  - Afebrile  - No signs or symptoms of infection    PPX:  - Protonix  - SQ heparin 5,000 units Q8H  - SCDs    Dispo: Anticipate discharge to home today. Rx for wound care and TF supplies ordered and discussed w/ RNCC yesterday      -- Patient and above plan to be discussed with staff, Dr. Garza and DEV Laws-C  Otolaryngology-Head & Neck Surgery  Please contact ENT by dialing * [phone] and entering job code [phone].

## 2018-09-25 NOTE — PROGRESS NOTES
Brief ENT Progress Note    Notified by RN patient with -160s with occasional spikes into 170s and 180s. Patient asymptomatic. IV metoprolol 5mg given x 2 with HR now in 120s. EKG obtained showing afib with . Cardiology notified. Enteral metoprolol 25mg BID recommended. Cardiology to assess the patient, will await further recommendations. Cancel plan for discharge today.    María Cheng PA-C  Otolaryngology-Head & Neck Surgery  Please contact ENT by dialing * [phone] and entering job code [phone].

## 2018-09-25 NOTE — PLAN OF CARE
Problem: Patient Care Overview  Goal: Plan of Care/Patient Progress Review  Outcome: Improving  Pt transferred to  about 1645 for closer monitoring, pt is A/Ox4 pleasant and cooperative.  Pt is up with SBA.  Oral flap, pink, soft, doppler in place, ENT came to bedside to assess pt.  Ng in place plan for pt to get 2nd bolus dose of TF tonight.  Pt up and walked in sun with SBA.  VS stable see flowsheets.

## 2018-09-25 NOTE — PLAN OF CARE
Martita came to the [facility] alone for tube feeding and BRYAN drain education. She has limited use of her left hand but was able to do a saline flush on the model as well as do a bolus/gravity feeding. She states her daughter will be there with her and will assist whenever needed. We also discussed care of the BRYAN if she were to go home with it and she stated understanding.

## 2018-09-26 ENCOUNTER — HOME INFUSION (PRE-WILLOW HOME INFUSION) (OUTPATIENT)
Dept: PHARMACY | Facility: CLINIC | Age: 63
End: 2018-09-26

## 2018-09-26 VITALS
WEIGHT: 204.81 LBS | HEIGHT: 65 IN | DIASTOLIC BLOOD PRESSURE: 68 MMHG | RESPIRATION RATE: 18 BRPM | TEMPERATURE: 97 F | OXYGEN SATURATION: 98 % | BODY MASS INDEX: 34.12 KG/M2 | HEART RATE: 95 BPM | SYSTOLIC BLOOD PRESSURE: 121 MMHG

## 2018-09-26 LAB
ANION GAP SERPL CALCULATED.3IONS-SCNC: 7 MMOL/L (ref 3–14)
BUN SERPL-MCNC: 24 MG/DL (ref 7–30)
CALCIUM SERPL-MCNC: 9.1 MG/DL (ref 8.5–10.1)
CHLORIDE SERPL-SCNC: 106 MMOL/L (ref 94–109)
CO2 SERPL-SCNC: 28 MMOL/L (ref 20–32)
CREAT SERPL-MCNC: 0.65 MG/DL (ref 0.52–1.04)
ERYTHROCYTE [DISTWIDTH] IN BLOOD BY AUTOMATED COUNT: 14 % (ref 10–15)
GFR SERPL CREATININE-BSD FRML MDRD: >90 ML/MIN/1.7M2
GLUCOSE SERPL-MCNC: 103 MG/DL (ref 70–99)
HCT VFR BLD AUTO: 34.5 % (ref 35–47)
HGB BLD-MCNC: 11.2 G/DL (ref 11.7–15.7)
INTERPRETATION ECG - MUSE: NORMAL
MCH RBC QN AUTO: 32.2 PG (ref 26.5–33)
MCHC RBC AUTO-ENTMCNC: 32.5 G/DL (ref 31.5–36.5)
MCV RBC AUTO: 99 FL (ref 78–100)
PLATELET # BLD AUTO: 351 10E9/L (ref 150–450)
POTASSIUM SERPL-SCNC: 4 MMOL/L (ref 3.4–5.3)
RBC # BLD AUTO: 3.48 10E12/L (ref 3.8–5.2)
SODIUM SERPL-SCNC: 141 MMOL/L (ref 133–144)
WBC # BLD AUTO: 8.7 10E9/L (ref 4–11)

## 2018-09-26 PROCEDURE — 25000132 ZZH RX MED GY IP 250 OP 250 PS 637: Performed by: STUDENT IN AN ORGANIZED HEALTH CARE EDUCATION/TRAINING PROGRAM

## 2018-09-26 PROCEDURE — 80048 BASIC METABOLIC PNL TOTAL CA: CPT | Performed by: PHYSICIAN ASSISTANT

## 2018-09-26 PROCEDURE — 25000132 ZZH RX MED GY IP 250 OP 250 PS 637: Performed by: PHYSICIAN ASSISTANT

## 2018-09-26 PROCEDURE — 36415 COLL VENOUS BLD VENIPUNCTURE: CPT | Performed by: PHYSICIAN ASSISTANT

## 2018-09-26 PROCEDURE — 85027 COMPLETE CBC AUTOMATED: CPT | Performed by: PHYSICIAN ASSISTANT

## 2018-09-26 PROCEDURE — 25000128 H RX IP 250 OP 636: Performed by: STUDENT IN AN ORGANIZED HEALTH CARE EDUCATION/TRAINING PROGRAM

## 2018-09-26 RX ADMIN — WHITE PETROLATUM: 1.75 OINTMENT TOPICAL at 08:10

## 2018-09-26 RX ADMIN — LISINOPRIL AND HYDROCHLOROTHIAZIDE 1 TABLET: 12.5; 1 TABLET ORAL at 08:09

## 2018-09-26 RX ADMIN — ASPIRIN 325 MG ORAL TABLET 325 MG: 325 PILL ORAL at 08:09

## 2018-09-26 RX ADMIN — CHLORHEXIDINE GLUCONATE 0.12% ORAL RINSE 15 ML: 1.2 LIQUID ORAL at 00:19

## 2018-09-26 RX ADMIN — Medication 1 PACKET: at 08:15

## 2018-09-26 RX ADMIN — METOPROLOL TARTRATE 25 MG: 100 TABLET, FILM COATED ORAL at 08:09

## 2018-09-26 RX ADMIN — HEPARIN SODIUM 5000 UNITS: 5000 INJECTION, SOLUTION INTRAVENOUS; SUBCUTANEOUS at 02:39

## 2018-09-26 RX ADMIN — CAFFEINE 200 MG: 200 TABLET ORAL at 08:09

## 2018-09-26 RX ADMIN — MULTIVITAMIN 15 ML: LIQUID ORAL at 08:09

## 2018-09-26 RX ADMIN — CHLORHEXIDINE GLUCONATE 0.12% ORAL RINSE 15 ML: 1.2 LIQUID ORAL at 08:09

## 2018-09-26 RX ADMIN — WHITE PETROLATUM: 1.75 OINTMENT TOPICAL at 00:19

## 2018-09-26 ASSESSMENT — ACTIVITIES OF DAILY LIVING (ADL)
ADLS_ACUITY_SCORE: 9

## 2018-09-26 NOTE — DISCHARGE SUMMARY
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 Infrastructure maxillectomy with lateral rhinotomy  2. Right neck exploration   3.  Left-sided radial forearm free tissue transfer.   4.  Split thickness skin graft in the left thigh and the left arm.   5.  Placement of nasogastric feeding tube.   6.  Splinting and VAC placement of the left forearm.     Pathology: pending    HPI: Martita Burden is a 62 year old female with history of hypertension and seasonal allergies who developed a hard palate lesion which was biopsied and found to be clear cell carcinoma. It was recommended that she undergo operative intervention and the patient consented to the above procedure after detailed explanation of the risks and benefits of said procedure.    Hospital Course: The patient was admitted to the hospital and underwent the above mentioned procedure. She tolerated the procedure without any intra- or dori-operative complications. Please see the operative report for full details of the procedure. The patient was admitted for post-operative monitoring. Her flap was stable throughout her hospital stay and she progressed very well. On POD#4 she developed a run of asymptomatic atrial fibrillation that resolved spontaneously. Cardiology was consulted, ECHO was obtained which was normal. She had a second episode on POD#5, which resolved after two doses of IV metoprolol 5mg. Cardiology recommended starting enteral 25 mg metoprolol BID and follow up as an outpatient, no need for anticoagulation at this time. No further episodes of atrial fibrillation and she remained in normal sinus rhythm for the remainder of her hospital stay. At  discharge, the patient's pain was well controlled, the patient was voiding on her own, and she was ambulating and tolerating her tube feeds.     Discharge Exam:  Vitals:    09/26/18 0012 09/26/18 0200 09/26/18 0425 09/26/18 0728   BP: 98/59  108/68 121/68   BP Location:   Right arm Right arm   Pulse:       Resp: 20 15 20 18   Temp:   97.6  F (36.4  C) 97  F (36.1  C)   TempSrc:   Oral Axillary   SpO2: 98% 93% 97% 98%   Weight:  92.9 kg (204 lb 12.9 oz)     Height:           General: resting comfortably in bed; not in acute distress  Neuro: alert, oriented, and answering questions appropriately  HEENT: Oral flap soft, warm, pale with strong implantable doppler signal. Doppler wire cut short and remains sutured to the neck. No signs of flap congestion or dehiscenence. Lateral rhinotomy incision clean, dry, intact, facial sutures removed. Neck incision clean, dry, intact. Neck soft and flat without evidence of hematoma or fluid collection. NG in place.  Pulmonary: Breathing comfortably on room air, no stridor or difficulty breathing  Extremities: Left arm dressed. Skin graft with 100% take, no fluid under the graft. Linear incision c/d/i. Fingers with intact sensation and good capillary refill. BRYAN drains x 1 in place and holding suction with serosanguinous drainage in bulbs. This was removed.       Review of your medicines      START taking       Dose / Directions    chlorhexidine 0.12 % solution   Commonly known as:  PERIDEX        Dose:  15 mL   Swish and spit 15 mLs in mouth every 8 hours   Quantity:  473 mL   Refills:  0       metoprolol 10 mg/mL Susp   Commonly known as:  LOPRESSOR   Used for:  Paroxysmal atrial fibrillation (H)        Dose:  25 mg   2.5 mLs (25 mg) by Per NG tube route 2 times daily for 14 days   Quantity:  70 mL   Refills:  0       mineral oil-hydrophilic petrolatum        Apply topically every 8 hours Apply to neck incision   Quantity:  50 g   Refills:  1       multivitamins with minerals Liqd  "liquid   Used for:  Nutritional deficiency        Dose:  15 mL   15 mLs by Per Feeding Tube route daily   Quantity:  1 Bottle   Refills:  1       order for DME        Equipment being ordered: Nasogastric bolus tube feeding supplies Formula: Isosource 1.5, 4 cans per day [place]field feeding bags 60 mL syringes  Treatment Diagnosis: clear cell adenocarcinoma   Quantity:  14 days   Refills:  1       order for DME        Equipment being ordered: Wound care supplies, 1 each daily x 21 days Xeroform occlusive gauze 5\" x 9\" Telfa non-adherent pad 8\" x 3\" Kerlix bandage roll 4-1/2\" x 4-1/8 yd ACE wrap, 4 inch (5 total)  Diagnosis: clear cell adenocarcinoma   Quantity:  21 days   Refills:  1       oxyCODONE IR 5 MG tablet   Commonly known as:  ROXICODONE   Used for:  Acute post-operative pain        Dose:  5-10 mg   1-2 tablets (5-10 mg) by Per Feeding Tube route every 3 hours as needed for moderate to severe pain   Quantity:  45 tablet   Refills:  0       senna-docusate 8.6-50 MG per tablet   Commonly known as:  SENOKOT-S;PERICOLACE   Used for:  Drug-induced constipation        Dose:  2 tablet   2 tablets by Per Feeding Tube route 2 times daily as needed for constipation   Quantity:  60 tablet   Refills:  0         CONTINUE these medicines which have NOT CHANGED       Dose / Directions    aspirin 325 MG EC tablet        Dose:  325 mg   Take 325 mg by mouth every morning ON HOLD FOR SURGERY SINCE 09/10/2018   Refills:  0       Calcium-Magnesium-Zinc 333-133-5 MG Tabs per tablet        Dose:  1 tablet   Take 1 tablet by mouth 2 times daily (with meals)   Refills:  0       [employer] FLAXSEED OIL 1000 MG Caps        Dose:  1000 mg   Take 1,000 mg by mouth 2 times daily ON HOLD FOR SURGERY SINCE 09/10/2018   Refills:  0       lisinopril-hydrochlorothiazide 10-12.5 MG per tablet   Commonly known as:  PRINZIDE/ZESTORETIC        Dose:  1 tablet   Take 1 tablet by mouth every morning   Refills:  0       nicotine 14 MG/24HR 24 hr patch "   Commonly known as:  NICODERM CQ        Dose:  1 patch   Place 1 patch onto the skin every 24 hours   Refills:  6       simvastatin 40 MG tablet   Commonly known as:  ZOCOR        Dose:  40 mg   Take 40 mg by mouth every evening   Refills:  0       TYLENOL PO        Dose:  1000 mg   Take 1,000 mg by mouth as needed for mild pain or fever   Refills:  0            Where to get your medicines      These medications were sent to [facility] [place], [place] - [address] [address]     Phone:  [phone]      chlorhexidine 0.12 % solution     metoprolol 10 mg/mL Susp     mineral oil-hydrophilic petrolatum     multivitamins with minerals Liqd liquid     senna-docusate 8.6-50 MG per tablet         Some of these will need a paper prescription and others can be bought over the counter. Ask your nurse if you have questions.     Bring a paper prescription for each of these medications      order for DME     order for DME     oxyCODONE IR 5 MG tablet             Discharge Procedure Orders  Home care nursing referral   Referral Type: Home Health Therapies & Aides     Home infusion referral     Cardiology Eval Adult Referral     MD face to face encounter   Order Comments: Documentation of Face to Face and Certification for Home Health Services    I certify that patient: Martita Burden is under my care and that I, or a nurse practitioner or physician's assistant working with me, had a face-to-face encounter that meets the physician face-to-face encounter requirements with this patient on: 9/24/2018.    This encounter with the patient was in whole, or in part, for the following medical condition, which is the primary reason for home health care: Flap, incisions, Intraoral flap, Wong-perdomo incision, BRYAN drains, Daily wound cares, NJ Tube feedings    I certify that, based on my findings, the following services are medically necessary home health services: Nursing.    My  clinical findings support the need for the above services because: Nurse is needed: For complex aftercare of surgical procedures because the patient needs instruction and cannot perform care on their own due to: wound care needs and visual checks to all wounds including oral., To assess incisions/flap surgery after changes in medications or other medical regimen., To provide assessment and oversight required in the home to assure adherence to the medical plan due to: wound care/TF needs. and To provide caregiver training to assist with: Wound care and TF needs and teaching..    Further, I certify that my clinical findings support that this patient is homebound (i.e. absences from home require considerable and taxing effort and are for medical reasons or Religion services or infrequently or of short duration when for other reasons) because: Requires assistance of another person or specialized equipment to access medical services because patient: Requires supervision of another for safe transfer...    Based on the above findings. I certify that this patient is confined to the home and needs intermittent skilled nursing care, physical therapy and/or speech therapy.  The patient is under my care, and I have initiated the establishment of the plan of care.  This patient will be followed by a physician who will periodically review the plan of care.  Physician/Provider to provide follow up care: Jolly Rodas    Attending hospital physician (the Medicare certified [facility] provider): Leslie Garza MD  Physician Signature: See electronic signature associated with these discharge orders.  Date: 9/24/2018     Supplies   Order Comments: List the supplies the pt needs to go home:  DCing rn please send pt home with 4 days of wound care supplies for all incisions and wounds.     Reason for your hospital stay   Order Comments: Post-operative care     Activity   Order Comments: Your activity upon discharge: No heavy lifting  "greater than 10 lbs and no strenuous exercise for 2 weeks or until follow up appointment. No driving while taking narcotic pain medications.   Order Specific Question Answer Comments   Is discharge order? Yes      When to contact your care team   Order Comments: Please notify your doctor if you experience wound breakdown, sustained bleeding from the wound site, or increasing redness, swelling, and/or purulent malorodorous discharge from the wound site which may indicate infection. If you feel it is acute, or experience sudden changes in breathing, chest pain, or excessive sleepiness/somnolence please return to the emergency department or call 911. If you have questions or concerns during the day please call ENT clinic and [phone]. If at night you can call [facility] at [phone] and ask for the \"ENT resident on call\".     Wound care and dressings   Order Comments: Instructions to care for your wound at home: Keep incisions clean and dry. Apply Aquaphor ointment to incisions three times daily to keep moist. You may shower, do not soak, scrub, or submerge incisions under water.     Daily dressing changes to left forearm: Remove old dressing. Clean incisions with saline. Apply Aquaphor ointment to linear incision and incision edges around skin graft site. Cover skin graft with xeroform gauze. Cover linear incision with Telfa non-stick dressing. Wrap forearm with Kerlix gauze roll and cover with ACE wrap.     Thigh skin graft donor site: Cover with calcium alginate and tegaderm, change dressing as needed for drainage. Once site is no longer draining you may leave open to air and apply Aquaphor ointment to keep moist.     [facility] Follow-up and recommended labs and tests   Order Comments: Follow up in ENT clinic with Dr. Gutierrez on Monday, 10/1/2018 at 10:30AM. You will receive a phone call with an appointment date/time. Please call the clinic with questions/concerns: " [phone].    Otolaryngology/ENT Clinic:  [facility]  [address] [place] [address]    Follow up with Dr. Diggs in cardiology clinic in 1 week after discharge as directed. Please call the cardiology clinic with questions/concerns: [phone]    Appointments on [place] and/or [facility] (with [facility] or [facility] provider or service). Call [phone] if you haven't heard regarding these appointments within 7 days of discharge.     Full Code   Order Specific Question Answer Comments   Code status determined by: Discussion with patient/legal decision maker      Diet   Order Comments: Follow this diet upon discharge:  Nothing to eat or drink by mouth. Bolus tube feeding via nasogastric feeding tube. Formula: Isosource 1.5, 4 cans per day. Give 1 can 4 times daily, separate feedings by 3-4 hours. Flush tube with 90 mL of water before and after each bolus feeding and with 30 mL of water before and after medications.   Order Specific Question Answer Comments   Is discharge order? Yes          Dispo: To home in good condition. All of the patient's questions/concerns have been addressed at this time.     María Cheng PA-C  Otolaryngology-Head & Neck Surgery  Please contact ENT by dialing [phone] and entering job code [phone].

## 2018-09-26 NOTE — PLAN OF CARE
Problem: Patient Care Overview  Goal: Plan of Care/Patient Progress Review  Outcome: Adequate for Discharge Date Met: 09/26/18  Pt A/Ox4 pleasant and cooperative.  Pt up independently t/o shift.  ENT came to bedside, cut doppler cord from right side of cheek (part of it remains in place and sutured to shoulder), plan to have it removed at pts follow up appt.  Left arm BRYAN also removed per ENT and dressing re-wrapped per ENT.  Left thigh dressing change done per this writer.  Pt half of first can of isosource, pt requested only half at this time and will do remaining half and remaining 3 cans herself, on previous feedings feel too full with 1 can all at once.  PT sent home with 4 days of dressing supplies for arm and thigh, then will have remaining supplies shipped to house.  Tube feeding delivered and sent home with pt.  Pt to stop at pharmacy at discharge to  medications.  Pt packed up own belongings.  VS stable prior to discharge.

## 2018-09-26 NOTE — PLAN OF CARE
Problem: Patient Care Overview  Goal: Plan of Care/Patient Progress Review  Outcome: Improving  Neuro: A&Ox4.   Cardiac: Sinus rhythm all night.   Respiratory: Sating adequately on RA.  GI/: Adequate urine output, voiding 100-200 ml's at a time, up to bathroom several times overnight.   Diet/appetite: Complains of feeling full at midnight. Improved as night progresses.   Activity:  Assist of 1, up to bathroom.  Pain: At acceptable level on current regimen.   Skin: No new deficits noted.  LDA's:  BRYAN on left arm patent, NJ clamped overnight.  Doppler machine with pulse overnight.   Plan: Continue with POC. Notify primary team with changes.  Anticipate possible discharge today.

## 2018-09-26 NOTE — PLAN OF CARE
Problem: Patient Care Overview  Goal: Plan of Care/Patient Progress Review  Outcome: Improving  Pt A&O X4; afebrile, VSS except episode of AFib beginning ~1200 this afternoon with HR sustaining between 130's-160's, (IV Metoprolol 5mg given x2 per ENT) for about 1.5hr, pt asymptomatic throughout this time; sustained NSR for remainder of shift, HR 70's-80's. 02 sats > 90% on RA, LS clear/diminished. BS+ X4, BM x2. CMS intact.  C/o headache earlier this shift, received scheduled Tylenol & caffeine pill with relief. R neck/R jaw incision ANTHONY, connected to arterial doppler, upper R roof of mouth pink/moist/soft. L arm Ace wrapped, wound vac removed by ENT; L BRYAN with minimal serosanguinous drainage. L thigh skin graft site covered, small amount drainage, reinforced x2. PIV in R arm patent & SL. NG to L nare in place, bolus feeds given x4 (1 feed only 125cc given d/t pt request this morning, tolerated full cans well for later feedings). Gets up with minimal SBA. Voiding urine adequately, passing gas. [facility], plans were for discharge today until AFib episode; ENT will reassess tomorrow 9/26. Continue to monitor per POC.

## 2018-09-26 NOTE — PROGRESS NOTES
Care Coordinator - Discharge Planning    Admission Date/Time:  9/20/2018  Attending MD:  Leslie Garza MD     Data  Date of initial CC assessment:    Chart reviewed, discussed with interdisciplinary team.   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 Clear cell carcinoma (H)    3. Acute post-operative pain    4. Drug-induced constipation    5. Nutritional deficiency    6. Paroxysmal atrial fibrillation (H)         Assessment   Pt medically stable for discharge to home today. [facility] updated and have delivered tube feeing formula and supplies to Pts hospital room. Pts Family will  Transport Pt home to [place] today. For dressing supplies, I have faxed RX and face sheet to [facility]  [place] [facility] #[phone], Fax #[phone], dressing supplies will be shipped to Pts home within 2-3 days.  Home Care nurse will visit Pt on 9/28/18.        Plan  Anticipated Discharge Date:  9/26/18  Anticipated Discharge Plan:  Discharge to home with [facility] for tube feeding support      Lynne Barragan RN   6B care coordinator #[phone]

## 2018-09-26 NOTE — PROGRESS NOTES
Therapy: Enteral   Insurance: [facility]    Co-Insurance: 100%    In reference to admission on 9/20/18 to check IV enteral coverage      Please contact Intake with any questions, [phone] or In Basket pool,  Home Infusion ([id number]).

## 2018-09-27 ENCOUNTER — TELEPHONE (OUTPATIENT)
Dept: OTOLARYNGOLOGY | Facility: CLINIC | Age: 63
End: 2018-09-27

## 2018-09-27 NOTE — PROGRESS NOTES
This is a recent snapshot of the patient's [facility] medical record.  For current drug dose and complete information and questions, call [phone] or In Basket pool, fv home infusion ([id number])  CSN Number:  [id number]

## 2018-09-27 NOTE — PROGRESS NOTES
This is a recent snapshot of the patient's [facility] medical record.  For current drug dose and complete information and questions, call [phone] or In [facility] pool, fv home infusion ([id number])  CSN Number:  [id number]

## 2018-09-27 NOTE — PLAN OF CARE
Problem: Patient Care Overview  Goal: Plan of Care/Patient Progress Review  Occupational Therapy Discharge Summary    Reason for therapy discharge:    Discharged to home.    Progress towards therapy goal(s). See goals on Care Plan in [facility] electronic health record for goal details.  Goals partially met.  Barriers to achieving goals:   discharge from facility.    Therapy recommendation(s):    No further therapy is recommended. Recommend assist from family as needed for safety with ADL/IADL. Pt not seen by discharging therapist.

## 2018-09-27 NOTE — TELEPHONE ENCOUNTER
[facility] Call Center    Phone Message: Can't come on 10/1/2018 Matt- daughter has surgery that day    May a detailed message be left on voicemail: yes    Reason for Call: Symptoms or Concerns          Current symptom or concern: Doing really well at home> has aides and daughter helping but can't come over on Monday 10/1/18 as daughter having surgery that day.   Hospital discharge on 9/26/18.  Would 10/4 or Friday 10/5 work?- reviewed that Dr Gutierrez was not in clinic that day, and if at all possible, should RTc.  Procedures:  Date: 9/20/18  Procedure(s):  1. Infrastructure maxillectomy with lateral rhinotomy  2. Right neck exploration   3.  Left-sided radial forearm free tissue transfer.   4.  Split thickness skin graft in the left thigh and the left arm.   5.  Placement of nasogastric feeding tube.   6.  Splinting and VAC placement of the left forearm.            Action Taken: Message routed to:  [facility] ([facility] ENT

## 2018-09-27 NOTE — PROGRESS NOTES
This is a recent snapshot of the patient's [facility] medical record.  For current drug dose and complete information and questions, call [phone] or In Basket pool, fv home infusion ([id number])  CSN Number:  [id number]

## 2018-10-03 ENCOUNTER — HOME INFUSION (PRE-WILLOW HOME INFUSION) (OUTPATIENT)
Dept: PHARMACY | Facility: CLINIC | Age: 63
End: 2018-10-03

## 2018-10-04 ENCOUNTER — CARE COORDINATION (OUTPATIENT)
Dept: OTOLARYNGOLOGY | Facility: CLINIC | Age: 63
End: 2018-10-04

## 2018-10-04 LAB — COPATH REPORT: NORMAL

## 2018-10-04 NOTE — PROGRESS NOTES
Called and left message for patient to check in to see how she is doing following discharge from the hospital. Patient is scheduled for follow-up with Dr. Garza tomorrow. Encouraged patient to return call to discuss.     Camryn Rivera RN, BSN

## 2018-10-04 NOTE — PROGRESS NOTES
This is a recent snapshot of the patient's [facility] medical record.  For current drug dose and complete information and questions, call [phone] or In Basket pool, fv home infusion ([id number])  CSN Number:  [id number]

## 2018-10-05 ENCOUNTER — THERAPY VISIT (OUTPATIENT)
Dept: SPEECH THERAPY | Facility: CLINIC | Age: 63
End: 2018-10-05
Payer: COMMERCIAL

## 2018-10-05 ENCOUNTER — OFFICE VISIT (OUTPATIENT)
Dept: OTOLARYNGOLOGY | Facility: CLINIC | Age: 63
End: 2018-10-05
Payer: COMMERCIAL

## 2018-10-05 VITALS — BODY MASS INDEX: 33.49 KG/M2 | HEIGHT: 65 IN | WEIGHT: 201 LBS

## 2018-10-05 DIAGNOSIS — C06.9 SQUAMOUS CELL CARCINOMA OF ORAL CAVITY (H): ICD-10-CM

## 2018-10-05 DIAGNOSIS — C05.0 MALIGNANT NEOPLASM OF HARD PALATE (H): Primary | ICD-10-CM

## 2018-10-05 DIAGNOSIS — R13.12 OROPHARYNGEAL DYSPHAGIA: Primary | ICD-10-CM

## 2018-10-05 ASSESSMENT — PAIN SCALES - GENERAL: PAINLEVEL: NO PAIN (0)

## 2018-10-05 NOTE — LETTER
10/5/2018       RE: Martita Burden  [address] [facility] [address]     Dear Colleague,    Thank you for referring your patient, Martita Burden, to the [facility] EAR NOSE AND THROAT at [facility]. Please see a copy of my visit note below.    Dear Dr. Steen:    I had the pleasure of seeing Martita Burden in follow-up today at the [facility].     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 She had a right hard palate mass noticed by her dentist. Biopsy was consistent with clear cell vs myoepithelial carcinoma. She was seen by Dr Steen and was supposed to undergo resection but her insurance company would not cover her obturator. She had pretreatment imaging which showed only the primary tumor in the palate. She was taken to the OR on 9/20/2018 for a right infrastructure maxillectomy and radial forearm reconstruction. Intraoperatively the tumor extended into the nasal floor and extended a partial septal resection. Her postoperative course was uncomplicated. Her final pathology demonstrated a 1.4 cm epithelial-myoepithelial carcinoma, depth of invasion 0.7 cm, no PNI or LVSI. There was no obvious bony erosion on pathology but the bone of the floor of the nose near the premaxilla was absent from tumor erosion both on radiology and clinically in the OR, resulting in final stage of T4N0. Pathology was reviewed at tumor board with recommendation for postoperative radiation.    She was discharged to home with a NG tube in place. She is not having issues with her wound care or tube feeds. She states that she is hungry. She is eager for the NG tube to be removed. She has no pain.       MEDICATIONS:     Current Outpatient Prescriptions   Medication Sig Dispense Refill     Acetaminophen (TYLENOL PO) Take 1,000 mg by mouth as needed for mild pain or fever       aspirin 325 MG EC  "tablet Take 325 mg by mouth every morning ON HOLD FOR SURGERY SINCE 09/10/2018       Calcium-Magnesium-Zinc 333-133-5 MG TABS per tablet Take 1 tablet by mouth 2 times daily (with meals)        chlorhexidine (PERIDEX) 0.12 % solution Swish and spit 15 mLs in mouth every 8 hours 473 mL 0     Flaxseed, Linseed, ([employer] FLAXSEED OIL) 1000 MG CAPS Take 1,000 mg by mouth 2 times daily ON HOLD FOR SURGERY SINCE 09/10/2018       lisinopril-hydrochlorothiazide (PRINZIDE/ZESTORETIC) 10-12.5 MG per tablet Take 1 tablet by mouth every morning        metoprolol (LOPRESSOR) 10 mg/mL SUSP 2.5 mLs (25 mg) by Per NG tube route 2 times daily for 14 days 70 mL 0     mineral oil-hydrophilic petrolatum (AQUAPHOR) Apply topically every 8 hours Apply to neck incision 50 g 1     multivitamins with minerals (CERTAVITE/CEROVITE) LIQD liquid 15 mLs by Per Feeding Tube route daily 1 Bottle 1     nicotine (NICODERM CQ) 14 MG/24HR 24 hr patch Place 1 patch onto the skin every 24 hours   6     order for DME Equipment being ordered: Wound care supplies, 1 each daily x 21 days  Xeroform occlusive gauze 5\" x 9\"  Telfa non-adherent pad 8\" x 3\"  Kerlix bandage roll 4-1/2\" x 4-1/8 yd  ACE wrap, 4 inch (5 total)    Diagnosis: clear cell adenocarcinoma 21 days 1     order for DME Equipment being ordered: Nasogastric bolus tube feeding supplies  Formula: Isosource 1.5, 4 cans per day  Gravity feeding bags  60 mL syringes    Treatment Diagnosis: clear cell adenocarcinoma 14 days 1     oxyCODONE IR (ROXICODONE) 5 MG tablet 1-2 tablets (5-10 mg) by Per Feeding Tube route every 3 hours as needed for moderate to severe pain 45 tablet 0     senna-docusate (SENOKOT-S;PERICOLACE) 8.6-50 MG per tablet 2 tablets by Per Feeding Tube route 2 times daily as needed for constipation 60 tablet 0     simvastatin (ZOCOR) 40 MG tablet Take 40 mg by mouth every evening         ALLERGIES:    Allergies   Allergen Reactions     Penicillins      Sulfa Drugs        HABITS/SOCIAL " HISTORY:   She was previously a 2.5 pack per day smoker and is currently doing about 5 cigarettes per day with use of the patch.  She denies any chewing tobacco use.  She denies any alcohol use.   She lives in the same building as her daughter and grandchildren.    She works in a greenhouse.    Social History     Social History     Marital status: Single     Spouse name: N/A     Number of children: N/A     Years of education: N/A     Occupational History     Not on file.     Social History Main Topics     Smoking status: Light Tobacco Smoker     Packs/day: 0.50     Years: 30.00     Types: Cigarettes     Smokeless tobacco: Never Used      Comment: currently down to 5 cigs a day     Alcohol use No     Drug use: No     Sexual activity: No     Other Topics Concern     Not on file     Social History Narrative       PAST MEDICAL HISTORY:   Past Medical History:   Diagnosis Date     Allergic rhinitis     Seasonal     Cancer (H) April     Hypertension 10 yrs ago        PAST SURGICAL HISTORY:   Past Surgical History:   Procedure Laterality Date     EXPLORE NECK Right 9/20/2018    Procedure: EXPLORE NECK;;  Surgeon: Leslie Garza MD;  Location: [facility] OR     GRAFT FREE VASCULARIZED (LOCATION) Right 9/20/2018    Procedure: GRAFT FREE VASCULARIZED (LOCATION);;  Surgeon: Terrence Gutierrez MD;  Location: [facility] OR     GRAFT SKIN SPLIT THICKNESS FROM EXTREMITY N/A 9/20/2018    Procedure: GRAFT SKIN SPLIT THICKNESS FROM EXTREMITY;;  Surgeon: Terrence Gutierrez MD;  Location: [facility] OR     HYSTERECTOMY       ORTHOPEDIC SURGERY  2/4/2017    Repaired broken ankle     OSTEOTOMY MAXILLA Right 9/20/2018    Procedure: OSTEOTOMY MAXILLA;  Right Maxillectomy, Right Neck Exploration, Left Forearm Free Flap, Split Thickness Skin Graft from Lower Extremity, Nasogastric Feeding Tube Placement;  Surgeon: Leslie Garza MD;  Location: [facility] OR       FAMILY HISTORY:    Family History   Problem Relation Age of Onset     Cancer Father      Cancer  "Brother      Hypertension Mother      Pretty much the whole family       REVIEW OF SYSTEMS:  12 point ROS was negative other than the symptoms noted above in the HPI.  Patient Supplied Answers to Review of Systems  [facility] ENT ROS 9/30/2018   Constitutional -   Musculoskeletal -   Allergy/Immunology Allergies or hay fever         PHYSICAL EXAMINATION:   Ht 1.651 m (5' 5\")  Wt 91.2 kg (201 lb)  BMI 33.45 kg/m2   Patient in NAD  Breathing comfortably on room air  NG tube in place  Right neck incision healing appropriately, sutures in place, wire in place (removed), no fluid collection  Intraorally there is a healthy appearing free flap reconstruction along the right palate, suture line interrogated with no evidence of dehiscence, vicryl sutures still in place, incision lines not completely healed  Forearm donor site with healing proximal incision with nylon sutures in place, good take of split thickness skin graft over donor site, small seroma collected along the superior most aspect of the skin graft site which was drained      RESULTS REVIEWED:     SPECIMEN(S):   A: Right soft palate margin   B: Hard palate mucosal margin   C: Right alveolar mucosal margin   D: Right gingival buccal mucosal margin   E: Intra nasal biopsy   F: Nasal septum mucosal margin   G: Right maxillary sinus content   H: Resection nasal septum mucosa   I: 2nd margin of nasal septum mucosa   J: Lymph node, right level 1B   K: Lymph node, right level 1B #2   L: Right maxillectomy     FINAL DIAGNOSIS:   A. MARGIN, RIGHT SOFT PALATE, RESECTION:   - Negative for malignancy.     B. MARGIN, HARD PALATE MUCOSA, RESECTION:   - Negative for malignancy.     C. MARGIN, RIGHT ALVEOLAR MUCOSAL, RESECTION:   - Negative for malignancy.     D. MARGIN, RIGHT GINGIVAL BUCCAL MUCOSA, RESECTION:   - Negative for malignancy.     E. INTRA NASAL, BIOPSY:    - POSITIVE FOR EPITHELIAL-MYOEPITHELIAL CARCINOMA.     F. MARGIN, NASAL SEPTUM MUCOSA, RESECTION:   - Negative for " malignancy.     G. RIGHT MAXILLARY SINUS CONTENT, RESECTION:   - Negative for malignancy.     H. NASAL SEPTUM MUCOSA, RESECTION:   - Negative for malignancy.     I. SECOND MARGIN OF NASAL SEPTUM MUCOSA, EXCISION:   - Negative for malignancy.     J. LYMPH NODE, RIGHT LEVEL 1B, EXCISION:   - One lymph node, negative for tumor (0/1).     K. LYMPH NODE, RIGHT LEVEL 1B #2, EXCISION:   - One lymph node, negative for tumor (0/1).     L. MAXILLA, RIGHT, MAXILLECTOMY:   - EPITHELIAL-MYOEPITHELIAL CARCINOMA, 1.4 cm in greatest dimension, see   comment.   - The tumor is centered with the hard palate expanding the bone and   infiltrating the sinonasal mucosa.   - Tumor depth of invasion is at least 0.7 cm.   - Margins are negative for tumor (see also parts A-D and F-I for final   margins).   - No angiolymphatic or perineural invasion observed.   - The AJCC pathologic staging is pT2 N0.   - See synoptic report.     COMMENT:   Sections show a low grade neoplasm comprised of infiltrating nests, cords   and sheets of two types of   neoplastic cells forming ducts and set in some areas in hyalinized stroma.    The luminal cells have round nuclei   with conspicuous nucleoli and the abluminal cells have hyperchromatic   nuclei and clear cytoplasm, often   forming nests of cells surrounded by a thin layer of collagen    the nests from surrounding stroma.   Myxoid stromal change is focally seen. The neoplasm is pushing in broad   fronts inside the sinonasal mucosa and   infiltrating between the mucinous glands and with bundle of nerves. There   is no infiltration within the nerve   sheath, however.  Immunohistochemistry studies show that the luminal cells    are diffusely and strongly positive   for CK7,  and EMA, while the myoepithelial cells are positive for   S-100, p40, p63 and calponin. There is   patchy weak positivity for SMA, SMM and vimentin. PAS special stain is   focally positive and sensitive to   diastase in  myoepithelial cells. Ki-67 proliferation index is increased to    15% overall.  Due to its   infiltrating behavior and increased overall proliferation index, this   neoplasm is best classified as   epithelial-myoepithelial carcinoma. ESWR1 gene rearrangement study (FISH)   is in progress and the result will   be reported separately.     Report Name: Lip and Oral Cavity        Status: Submitted Checklist Inst: 1      Last Updated By: Christin Gregg M.D., [facility], 10/04/2018   15:12:37   Part(s) Involved:   L: Right maxillectomy     Synoptic Report:     SPECIMEN     Procedure:         - Maxillectomy - Infrastructure maxillectomy with lateral rhinotomy,    right         - Neck (lymph node) dissection - right neck exploration     TUMOR     Tumor Site:         right hard palate     Tumor Laterality:         - Right     Histologic Type:         - Epithelial-myoepithelial carcinoma     Tumor Size: 1.4 x 1.2 x 0.7 Centimeters (cm)     Tumor Depth of Invasion (DOI): At least 7 Millimeters (mm)     Tumor Focality:         - Unifocal     Accessory Findings       Lymphovascular Invasion:           - Not identified       Perineural Invasion:           - Not identified     MARGINS     Specimen Margins:         - Uninvolved by invasive tumor       Distance from Closest Margin At least 10 Millimeters (mm)     Tumor Bed (separately submitted) Margin Orientation:         - Unoriented to true margin surface     Tumor Bed (separately submitted) Margins:         - Uninvolved by invasive tumor       Distance to True Margin Surface: Cannot be determined - no tumor   present in final margins     LYMPH NODES     Number of Lymph Nodes Involved:         - 0     Number of Lymph Nodes Examined: 2     PATHOLOGIC STAGE CLASSIFICATION (PTNM, AJCC 8TH EDITION)     Primary Tumor (pT):         - pT2     Regional Lymph Nodes (pN):         - pN0       IMPRESSION AND PLAN:   Patient is a 62-year-old woman with a T4N0 epithelial-myoepithelial  tumor of the hard palate. She now s/p infrastructure maxillectomy and radial forearm free flap reconstruction. She is overall doing well. We reviewed the pathology results today. Given the higher stage tumor, she is recommended for consideration of postoperative radiation. We will arrange an appointment with radiation oncology which we will try to coordinate with her next visit. She was seen by speech pathology today and cleared for an oral diet, with removal of the NG tube.    Thank you very much for the opportunity to participate in the care of your patient.      Leslie Garza M.D.  Otolaryngology- Head & Neck Surgery    CC:  Tim Steen MD  [address] [address]

## 2018-10-05 NOTE — MR AVS SNAPSHOT
After Visit Summary   10/5/2018    Ms. Martita Burden    MRN: [id number]           Patient Information     Date Of Birth          1955        Visit Information        Provider Department      10/5/2018 2:40 PM Leslie Garza MD [facility] Ear Nose and Throat        Today's Diagnoses     Malignant neoplasm of hard palate (H)    -  1      Care Instructions    1. Please follow-up in clinic on Monday 10/15 with Dr. Gutierrez  2. Please call the ENT clinic with any questions,concerns, new or worsening symptoms.    -Clinic number is [phone]   - Camryn's direct line (Dr. Garza's nurse) [phone]              Follow-ups after your visit        Your next 10 appointments already scheduled     Oct 15, 2018  9:30 AM CDT   (Arrive by 9:15 AM)   Return Visit with Terrence Gutierrez MD   [facility] Th[facility] ([facility])    [address]  4th Floor  [place] [id number] [phone]            Oct 15, 2018  1:00 PM CDT   CONSULT with Brian Ackerman MD   Radiation Oncology Clinic ([facility])    [facility]  1st Floor  [address] [id number] [phone]            Oct 23, 2018 10:30 AM CDT   (Arrive by 10:15 AM)   Return Visit with Lucas Diggs MD   [facility]    [address]  [facility] [address]   [phone]              Who to contact     Please call your clinic at [phone] to:    Ask questions about your health    Make or cancel appointments    Discuss your medicines    Learn about your test results    Speak to your doctor            Additional Information About Your Visit        MyChart Information     [employer] gives you secure access to your electronic health record. If you see a primary care provider, you can also send messages to your care team and make appointments. If you have questions, please call your  "primary care clinic.  If you do not have a primary care provider, please call [phone] and they will assist you.      [employer] is an electronic gateway that provides easy, online access to your medical records. With [employer], you can request a clinic appointment, read your test results, renew a prescription or communicate with your care team.     To access your existing account, please contact your [facility] or call [phone] for assistance.        Care EveryWhere ID     This is your Care EveryWhere ID. This could be used by other organizations to access your [place] medical records  [id number]        Your Vitals Were     Height BMI (Body Mass Index)                1.651 m (5' 5\") 33.45 kg/m2           Blood Pressure from Last 3 Encounters:   09/26/18 121/68   09/17/18 143/83    Weight from Last 3 Encounters:   10/05/18 91.2 kg (201 lb)   09/26/18 92.9 kg (204 lb 12.9 oz)   09/17/18 95.9 kg (211 lb 8 oz)              Today, you had the following     No orders found for display       Primary Care Provider Office Phone # Fax #    Jolly Adrianna [phone]       [facility] [address] [facility] [address]        Equal Access to Services     JJ CARRION : Hadii aad ku hadasho Soomaali, waaxda luqadaha, qaybta kaalmada adeegyada, waxay idiin haymarkn charley urena. So [facility] [phone].    ATENCIÓN: Si habla español, tiene a robison disposición servicios gratuitos de asistencia lingüística. Llame al [phone].    We comply with applicable federal civil rights laws and [place] laws. We do not discriminate on the basis of race, color, national origin, age, disability, sex, sexual orientation, or gender identity.            Thank you!     Thank you for choosing [facility] EAR NOSE AND THROAT  for your care. Our goal is always to provide you with excellent care. Hearing back from our patients is one way we can continue to improve our services. Please take " "a few minutes to complete the written survey that you may receive in the mail after your visit with us. Thank you!             Your Updated Medication List - Protect others around you: Learn how to safely use, store and throw away your medicines at www.disposemymeds.org.          This list is accurate as of 10/5/18 11:59 PM.  Always use your most recent med list.                   Brand Name Dispense Instructions for use Diagnosis    aspirin 325 MG EC tablet      Take 325 mg by mouth every morning ON HOLD FOR SURGERY SINCE 09/10/2018        Calcium-Magnesium-Zinc 333-133-5 MG Tabs per tablet      Take 1 tablet by mouth 2 times daily (with meals)        [employer]S FLAXSEED OIL 1000 MG Caps      Take 1,000 mg by mouth 2 times daily ON HOLD FOR SURGERY SINCE 09/10/2018        lisinopril-hydrochlorothiazide 10-12.5 MG per tablet    PRINZIDE/ZESTORETIC     Take 1 tablet by mouth every morning        metoprolol 10 mg/mL Susp    LOPRESSOR    70 mL    2.5 mLs (25 mg) by Per NG tube route 2 times daily for 14 days    Paroxysmal atrial fibrillation (H)       mineral oil-hydrophilic petrolatum     50 g    Apply topically every 8 hours Apply to neck incision    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)       multivitamins with minerals Liqd liquid     1 Bottle    15 mLs by Per Feeding Tube route daily    Nutritional deficiency       nicotine 14 MG/24HR 24 hr patch    NICODERM CQ     Place 1 patch onto the skin every 24 hours        order for DME     14 days    Equipment being ordered: Nasogastric bolus tube feeding supplies Formula: Isosource 1.5, 4 cans per day [place] feeding bags 60 mL syringes  Treatment Diagnosis: clear cell adenocarcinoma    Clear cell carcinoma (H)       order for DME     21 days    Equipment being ordered: Wound care supplies, 1 each daily x 21 days Xeroform occlusive gauze 5\" x 9\" Telfa non-adherent pad 8\" x 3\" Kerlix bandage roll 4-1/2\" x 4-1/8 yd ACE wrap, 4 inch (5 total)  Diagnosis: clear cell " adenocarcinoma    Clear cell carcinoma (H)       oxyCODONE IR 5 MG tablet    ROXICODONE    45 tablet    1-2 tablets (5-10 mg) by Per Feeding Tube route every 3 hours as needed for moderate to severe pain    Acute post-operative pain       senna-docusate 8.6-50 MG per tablet    SENOKOT-S;PERICOLACE    60 tablet    2 tablets by Per Feeding Tube route 2 times daily as needed for constipation    Drug-induced constipation       simvastatin 40 MG tablet    ZOCOR     Take 40 mg by mouth every evening        TYLENOL PO      Take 1,000 mg by mouth as needed for mild pain or fever

## 2018-10-05 NOTE — MR AVS SNAPSHOT
After Visit Summary   10/5/2018    Ms. 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    Please be aware that coverage of these services is subject to the terms and limitations of your health insurance plan.  Call member services at your health plan with any benefit or coverage questions.                  Your next 10 appointments already scheduled     Oct 15, 2018  9:30 AM CDT   (Arrive by 9:15 AM)   Return Visit with Terrence Gutierrez MD   [facility] ([facility])    [address] [address] [phone]            Oct 23, 2018 10:30 AM CDT   (Arrive by 10:15 AM)   Return Visit with Lucas Diggs MD   [facility]    [address] [facility] [address] [phone]              Future tests that were ordered for you today     Open Future Orders        Priority Expected Expires Ordered    SPEECH THERAPY REFERRAL Routine  10/5/2019 10/5/2018            Who to contact     Please call your clinic at [phone] to:    Ask questions about your health    Make or cancel appointments    Discuss your medicines    Learn about your test results    Speak to your doctor            Additional Information About Your Visit        MyChart Information     MyChart gives you secure access to your  electronic health record. If you see a primary care provider, you can also send messages to your care team and make appointments. If you have questions, please call your primary care clinic.  If you do not have a primary care provider, please call [phone] and they will assist you.      [employer] is an electronic gateway that provides easy, online access to your medical records. With [employer], you can request a clinic appointment, read your test results, renew a prescription or communicate with your care team.     To access your existing account, please contact your [facility] or call [phone] for assistance.        Care EveryWhere ID     This is your Care EveryWhere ID. This could be used by other organizations to access your [place] medical records  [id number]         Blood Pressure from Last 3 Encounters:   09/26/18 121/68   09/17/18 143/83    Weight from Last 3 Encounters:   10/05/18 91.2 kg (201 lb)   09/26/18 92.9 kg (204 lb 12.9 oz)   09/17/18 95.9 kg (211 lb 8 oz)               Primary Care Provider Office Phone # Fax #    Jolly Adrianna [phone]       [facility] [address] [facility] [address]        Equal Access to Services     JJ CARRION : Hadii aad ku hadasho Soomaali, waaxda luqadaha, qaybta kaalmada adeegyada, waxay idiin haymarkn charley barragan lalucy ah. So [facility] [phone].    ATENCIÓN: Si habla español, tiene a robison disposición servicios gratuitos de asistencia lingüística. Llame al [phone].    We comply with applicable federal civil rights laws and [place] laws. We do not discriminate on the basis of race, color, national origin, age, disability, sex, sexual orientation, or gender identity.            Thank you!     Thank you for choosing [facility]  for your care. Our goal is always to provide you with excellent care. Hearing back from our patients is one way we can continue to improve our services. Please take a few  minutes to complete the written survey that you may receive in the mail after your visit with us. Thank you!             Your Updated Medication List - Protect others around you: Learn how to safely use, store and throw away your medicines at www.disposemymeds.org.          This list is accurate as of 10/5/18  5:05 PM.  Always use your most recent med list.                   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)       [employer] FLAXSEED OIL 1000 MG Caps      Take 1,000 mg by mouth 2 times daily ON HOLD FOR SURGERY SINCE 09/10/2018        lisinopril-hydrochlorothiazide 10-12.5 MG per tablet    PRINZIDE/ZESTORETIC     Take 1 tablet by mouth every morning        metoprolol 10 mg/mL Susp    LOPRESSOR    70 mL    2.5 mLs (25 mg) by Per NG tube route 2 times daily for 14 days    Paroxysmal atrial fibrillation (H)       mineral oil-hydrophilic petrolatum     50 g    Apply topically every 8 hours Apply to neck incision    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)       multivitamins with minerals Liqd liquid     1 Bottle    15 mLs by Per Feeding Tube route daily    Nutritional deficiency       nicotine 14 MG/24HR 24 hr patch    NICODERM CQ     Place 1 patch onto the skin every 24 hours        order for DME     14 days    Equipment being ordered: Nasogastric bolus tube feeding supplies Formula: Isosource 1.5, 4 cans per day Monon feeding bags 60 mL syringes  Treatment Diagnosis: clear cell adenocarcinoma    Clear cell carcinoma (H)       order for DME     21 days    Equipment being ordered: Wound care supplies, 1 each daily x 21 days Xeroform  "occlusive gauze 5\" x 9\" Telfa non-adherent pad 8\" x 3\" Kerlix bandage roll 4-1/2\" x 4-1/8 yd ACE wrap, 4 inch (5 total)  Diagnosis: clear cell adenocarcinoma    Clear cell carcinoma (H)       oxyCODONE IR 5 MG tablet    ROXICODONE    45 tablet    1-2 tablets (5-10 mg) by Per Feeding Tube route every 3 hours as needed for moderate to severe pain    Acute post-operative pain       senna-docusate 8.6-50 MG per tablet    SENOKOT-S;PERICOLACE    60 tablet    2 tablets by Per Feeding Tube route 2 times daily as needed for constipation    Drug-induced constipation       simvastatin 40 MG tablet    ZOCOR     Take 40 mg by mouth every evening        TYLENOL PO      Take 1,000 mg by mouth as needed for mild pain or fever          "

## 2018-10-05 NOTE — NURSING NOTE
Chief Complaint   Patient presents with     RECHECK     follow up after surgery      Matthew Mott, EMT

## 2018-10-05 NOTE — PATIENT INSTRUCTIONS
1. Please follow-up in clinic on Monday 10/15 with Dr. Gutierrez  2. Please call the ENT clinic with any questions,concerns, new or worsening symptoms.    -Clinic number is [phone]   - Camryn's direct line (Dr. Garza's nurse) [phone]

## 2018-10-05 NOTE — PROGRESS NOTES
10/05/18 1440   General Information   Type Of Visit Initial   Start Of Care Date 10/05/18   Referring Physician Dr. 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 MD requested SLP to complete clinical swallow evaluation. Upon clinical interview, pt denied swallowing difficulties prior to surgery. Reported she was previously tolerating soft/pureed foods and thin liquids as she does not have great dentition. Pt currently has been NPO with NG for nutrition/hydration since surgery.    Respiratory Status Room air   Prior Level Of Function Swallowing   Prior Level Of Function Comment NPO w/ NG since surgery; soft solid and purees with thin liquids prior to surgery   General Observations Pt highly pleasant and cooperative   Patient/family Goals To eat and drink by mouth   Clinical Swallow Evaluation   Oral Musculature anomalies present   Structural Abnormalities present   Dentition other (see comments)  (minimal lower dentition; edentulous upper)   Mucosal Quality dry   Mandibular Strength and Mobility intact   Oral Labial Strength and Mobility WFL   Lingual Strength and Mobility WFL   Velar Elevation intact   Laryngeal Function Swallow;Voicing initiated;Dry swallow palpated   Oral Musculature Comments Strength, coordination, and ROM generally WFL   Additional Documentation Yes   Clinical Swallow Eval: Thin  Liquid Texture Trial   Mode of Presentation, Thin Liquids cup;fed by clinician   Volume of Liquid or Food Presented ~3 oz   Oral Phase of Swallow WFL   Pharyngeal Phase of Swallow intact   Diagnostic Statement No overt s/sx of aspiration. Swallow response prompt with adequate hyolaryngeal elevation.    Clinical Swallow Eval: Puree Solid Texture Trial   Mode of Presentation, Puree spoon;self-fed   Volume of Puree Presented x3 teaspoons   Oral Phase, Puree WFL   Pharyngeal Phase, Puree intact   Diagnostic Statement No overt s/sx of aspiration. Denied feeling of pharyngeal stasis.    Swallow Compensations   Swallow Compensations No compensations were used   Results No difficulties noted   Educational Assessment   Barriers to Learning No barriers   Esophageal Phase of Swallow   Patient reports or presents with symptoms of esophageal dysphagia No   General Therapy Interventions   Planned Therapy Interventions Dysphagia Treatment   Dysphagia treatment Oropharyngeal exercise training;Modified diet education;Instruction of safe swallow strategies;Compensatory strategies for swallowing   Swallow Eval: Clinical Impressions   Skilled Criteria for Therapy Intervention Skilled criteria met.  Treatment indicated.   Functional Assessment Scale (FAS) 4   Treatment Diagnosis Mild-moderate oral dysphagia   Diet texture recommendations Thin liquids  (Dental/mechanical soft textures)   Recommended Feeding/Eating Techniques alternate between small bites and sips of food/liquid;hard swallow w/ each bite or sip;small sips/bites;other (see comments)  (slow pace)   Rehab Potential good, to achieve stated therapy goals   Therapy Frequency other (see comments)  (2x/month x 3 months)   Anticipated Discharge Disposition home w/ outpatient services   Risks and Benefits of Treatment have been explained. Yes   Patient, family and/or staff in agreement with Plan of Care Yes   Clinical Impression Comments Pt presents with mild-moderate oral  dysphagia mostly due to lack of dentition. She reports she previously tolerated a soft/pureed texture diet and thin liquids prior to recent surgery because of her poor dentition. Strength, ROM, and coordination of oral musculature are generally WFL. Pt exhibits no overt s/sx of aspiration with thin liquids or puree textures. Swallow response prompt with adequate hyolaryngeal elevation upon manual palpation. Pt trained on diet recommendations of dental/mechanical soft diet and thin liquids as well as general swallowing strategies. NG removed by RN at end of visit today. Recommend ongoing SLP services to ensure tolerance of diet and provide additional training on swallowing strategies.  Pt may also undergo adjuvant radiation treatment so recommend additional SLP services if she does as she will be at increased risk of progressive radiation-associated dysphagia.    Swallow Goals   SLP Swallow Goals 1   Swallow Goal 1   Goal Identifier Diet   Goal Description 1. Pt will tolerate dental/mechanical soft textures and thin liquids with independent use of swallowing strategies and no overt s/sx of aspiration in 9/10 trials per clinician assessment and/or pt report.    Target Date 01/03/19   Total Session Time   Total Session Time 15   Total Evaluation Time 15     Thank you for the referral of Martita Burden. If you have any questions about this report, please contact me using the information below.     Xochilt Hubbard MA, CCC-SLP  Speech-Language Pathologist   [facility]  Department of Otolaryngology/D&T - 4th floor  Pager: [phone]  Phone: [phone]  Email: [email or web address]

## 2018-10-08 ENCOUNTER — PRE VISIT (OUTPATIENT)
Dept: RADIATION ONCOLOGY | Facility: CLINIC | Age: 63
End: 2018-10-08

## 2018-10-08 DIAGNOSIS — C06.9 SQUAMOUS CELL CARCINOMA OF ORAL CAVITY (H): ICD-10-CM

## 2018-10-08 LAB — COPATH REPORT: NORMAL

## 2018-10-08 RX ORDER — CHLORHEXIDINE GLUCONATE ORAL RINSE 1.2 MG/ML
15 SOLUTION DENTAL EVERY 8 HOURS
Qty: 473 ML | Refills: 0 | Status: SHIPPED | OUTPATIENT
Start: 2018-10-08 | End: 2018-10-29

## 2018-10-08 NOTE — PROGRESS NOTES
Dear Dr. Steen:    I had the pleasure of seeing Martita Burden in follow-up today at the [facility].     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 She had a right hard palate mass noticed by her dentist. Biopsy was consistent with clear cell vs myoepithelial carcinoma. She was seen by Dr Steen and was supposed to undergo resection but her insurance company would not cover her obturator. She had pretreatment imaging which showed only the primary tumor in the palate. She was taken to the OR on 9/20/2018 for a right infrastructure maxillectomy and radial forearm reconstruction. Intraoperatively the tumor extended into the nasal floor and extended a partial septal resection. Her postoperative course was uncomplicated. Her final pathology demonstrated a 1.4 cm epithelial-myoepithelial carcinoma, depth of invasion 0.7 cm, no PNI or LVSI. There was no obvious bony erosion on pathology but the bone of the floor of the nose near the premaxilla was absent from tumor erosion both on radiology and clinically in the OR, resulting in final stage of T4N0. Pathology was reviewed at tumor board with recommendation for postoperative radiation.    She was discharged to home with a NG tube in place. She is not having issues with her wound care or tube feeds. She states that she is hungry. She is eager for the NG tube to be removed. She has no pain.       MEDICATIONS:     Current Outpatient Prescriptions   Medication Sig Dispense Refill     Acetaminophen (TYLENOL PO) Take 1,000 mg by mouth as needed for mild pain or fever       aspirin 325 MG EC tablet Take 325 mg by mouth every morning ON HOLD FOR SURGERY SINCE 09/10/2018       Calcium-Magnesium-Zinc 333-133-5 MG TABS per tablet Take 1 tablet by mouth 2 times daily (with meals)        chlorhexidine (PERIDEX) 0.12 % solution Swish and spit 15 mLs in mouth every 8  "hours 473 mL 0     Flaxseed, Linseed, ([employer] FLAXSEED OIL) 1000 MG CAPS Take 1,000 mg by mouth 2 times daily ON HOLD FOR SURGERY SINCE 09/10/2018       lisinopril-hydrochlorothiazide (PRINZIDE/ZESTORETIC) 10-12.5 MG per tablet Take 1 tablet by mouth every morning        metoprolol (LOPRESSOR) 10 mg/mL SUSP 2.5 mLs (25 mg) by Per NG tube route 2 times daily for 14 days 70 mL 0     mineral oil-hydrophilic petrolatum (AQUAPHOR) Apply topically every 8 hours Apply to neck incision 50 g 1     multivitamins with minerals (CERTAVITE/CEROVITE) LIQD liquid 15 mLs by Per Feeding Tube route daily 1 Bottle 1     nicotine (NICODERM CQ) 14 MG/24HR 24 hr patch Place 1 patch onto the skin every 24 hours   6     order for DME Equipment being ordered: Wound care supplies, 1 each daily x 21 days  Xeroform occlusive gauze 5\" x 9\"  Telfa non-adherent pad 8\" x 3\"  Kerlix bandage roll 4-1/2\" x 4-1/8 yd  ACE wrap, 4 inch (5 total)    Diagnosis: clear cell adenocarcinoma 21 days 1     order for DME Equipment being ordered: Nasogastric bolus tube feeding supplies  Formula: Isosource 1.5, 4 cans per day  Gravity feeding bags  60 mL syringes    Treatment Diagnosis: clear cell adenocarcinoma 14 days 1     oxyCODONE IR (ROXICODONE) 5 MG tablet 1-2 tablets (5-10 mg) by Per Feeding Tube route every 3 hours as needed for moderate to severe pain 45 tablet 0     senna-docusate (SENOKOT-S;PERICOLACE) 8.6-50 MG per tablet 2 tablets by Per Feeding Tube route 2 times daily as needed for constipation 60 tablet 0     simvastatin (ZOCOR) 40 MG tablet Take 40 mg by mouth every evening         ALLERGIES:    Allergies   Allergen Reactions     Penicillins      Sulfa Drugs        HABITS/SOCIAL HISTORY:   She was previously a 2.5 pack per day smoker and is currently doing about 5 cigarettes per day with use of the patch.  She denies any chewing tobacco use.  She denies any alcohol use.   She lives in the same building as her daughter and grandchildren.    She works " in a greenhouse.    Social History     Social History     Marital status: Single     Spouse name: N/A     Number of children: N/A     Years of education: N/A     Occupational History     Not on file.     Social History Main Topics     Smoking status: Light Tobacco Smoker     Packs/day: 0.50     Years: 30.00     Types: Cigarettes     Smokeless tobacco: Never Used      Comment: currently down to 5 cigs a day     Alcohol use No     Drug use: No     Sexual activity: No     Other Topics Concern     Not on file     Social History Narrative       PAST MEDICAL HISTORY:   Past Medical History:   Diagnosis Date     Allergic rhinitis     Seasonal     Cancer (H) April     Hypertension 10 yrs ago        PAST SURGICAL HISTORY:   Past Surgical History:   Procedure Laterality Date     EXPLORE NECK Right 9/20/2018    Procedure: EXPLORE NECK;;  Surgeon: Leslie Garza MD;  Location: [facility] OR     GRAFT FREE VASCULARIZED (LOCATION) Right 9/20/2018    Procedure: GRAFT FREE VASCULARIZED (LOCATION);;  Surgeon: Terrence Gutierrez MD;  Location: [facility] OR     GRAFT SKIN SPLIT THICKNESS FROM EXTREMITY N/A 9/20/2018    Procedure: GRAFT SKIN SPLIT THICKNESS FROM EXTREMITY;;  Surgeon: Terrence Gutierrez MD;  Location: [facility] OR     HYSTERECTOMY       ORTHOPEDIC SURGERY  2/4/2017    Repaired broken ankle     OSTEOTOMY MAXILLA Right 9/20/2018    Procedure: OSTEOTOMY MAXILLA;  Right Maxillectomy, Right Neck Exploration, Left Forearm Free Flap, Split Thickness Skin Graft from Lower Extremity, Nasogastric Feeding Tube Placement;  Surgeon: Leslie Garza MD;  Location: [facility] OR       FAMILY HISTORY:    Family History   Problem Relation Age of Onset     Cancer Father      Cancer Brother      Hypertension Mother      Pretty much the whole family       REVIEW OF SYSTEMS:  12 point ROS was negative other than the symptoms noted above in the HPI.  Patient Supplied Answers to Review of Systems  [facility] E[facility] ROS 9/30/2018   Constitutional -   Musculoskeletal -  "  Allergy/Immunology Allergies or hay fever         PHYSICAL EXAMINATION:   Ht 1.651 m (5' 5\")  Wt 91.2 kg (201 lb)  BMI 33.45 kg/m2   Patient in NAD  Breathing comfortably on room air  NG tube in place  Right neck incision healing appropriately, sutures in place, wire in place (removed), no fluid collection  Intraorally there is a healthy appearing free flap reconstruction along the right palate, suture line interrogated with no evidence of dehiscence, vicryl sutures still in place, incision lines not completely healed  Forearm donor site with healing proximal incision with nylon sutures in place, good take of split thickness skin graft over donor site, small seroma collected along the superior most aspect of the skin graft site which was drained      RESULTS REVIEWED:     SPECIMEN(S):   A: Right soft palate margin   B: Hard palate mucosal margin   C: Right alveolar mucosal margin   D: Right gingival buccal mucosal margin   E: Intra nasal biopsy   F: Nasal septum mucosal margin   G: Right maxillary sinus content   H: Resection nasal septum mucosa   I: 2nd margin of nasal septum mucosa   J: Lymph node, right level 1B   K: Lymph node, right level 1B #2   L: Right maxillectomy     FINAL DIAGNOSIS:   A. MARGIN, RIGHT SOFT PALATE, RESECTION:   - Negative for malignancy.     B. MARGIN, HARD PALATE MUCOSA, RESECTION:   - Negative for malignancy.     C. MARGIN, RIGHT ALVEOLAR MUCOSAL, RESECTION:   - Negative for malignancy.     D. MARGIN, RIGHT GINGIVAL BUCCAL MUCOSA, RESECTION:   - Negative for malignancy.     E. INTRA NASAL, BIOPSY:    - POSITIVE FOR EPITHELIAL-MYOEPITHELIAL CARCINOMA.     F. MARGIN, NASAL SEPTUM MUCOSA, RESECTION:   - Negative for malignancy.     G. RIGHT MAXILLARY SINUS CONTENT, RESECTION:   - Negative for malignancy.     H. NASAL SEPTUM MUCOSA, RESECTION:   - Negative for malignancy.     I. SECOND MARGIN OF NASAL SEPTUM MUCOSA, EXCISION:   - Negative for malignancy.     J. LYMPH NODE, RIGHT LEVEL " 1B, EXCISION:   - One lymph node, negative for tumor (0/1).     K. LYMPH NODE, RIGHT LEVEL 1B #2, EXCISION:   - One lymph node, negative for tumor (0/1).     L. MAXILLA, RIGHT, MAXILLECTOMY:   - EPITHELIAL-MYOEPITHELIAL CARCINOMA, 1.4 cm in greatest dimension, see   comment.   - The tumor is centered with the hard palate expanding the bone and   infiltrating the sinonasal mucosa.   - Tumor depth of invasion is at least 0.7 cm.   - Margins are negative for tumor (see also parts A-D and F-I for final   margins).   - No angiolymphatic or perineural invasion observed.   - The AJCC pathologic staging is pT2 N0.   - See synoptic report.     COMMENT:   Sections show a low grade neoplasm comprised of infiltrating nests, cords   and sheets of two types of   neoplastic cells forming ducts and set in some areas in hyalinized stroma.    The luminal cells have round nuclei   with conspicuous nucleoli and the abluminal cells have hyperchromatic   nuclei and clear cytoplasm, often   forming nests of cells surrounded by a thin layer of collagen    the nests from surrounding stroma.   Myxoid stromal change is focally seen. The neoplasm is pushing in broad   fronts inside the sinonasal mucosa and   infiltrating between the mucinous glands and with bundle of nerves. There   is no infiltration within the nerve   sheath, however.  Immunohistochemistry studies show that the luminal cells    are diffusely and strongly positive   for CK7,  and EMA, while the myoepithelial cells are positive for   S-100, p40, p63 and calponin. There is   patchy weak positivity for SMA, SMM and vimentin. PAS special stain is   focally positive and sensitive to   diastase in myoepithelial cells. Ki-67 proliferation index is increased to    15% overall.  Due to its   infiltrating behavior and increased overall proliferation index, this   neoplasm is best classified as   epithelial-myoepithelial carcinoma. ESWR1 gene rearrangement study (FISH)    is in progress and the result will   be reported separately.     Report Name: Lip and Oral Cavity        Status: Submitted Checklist Inst: 1      Last Updated By: Christin Gregg M.D., [facility], 10/04/2018   15:12:37   Part(s) Involved:   L: Right maxillectomy     Synoptic Report:     SPECIMEN     Procedure:         - Maxillectomy - Infrastructure maxillectomy with lateral rhinotomy,    right         - Neck (lymph node) dissection - right neck exploration     TUMOR     Tumor Site:         right hard palate     Tumor Laterality:         - Right     Histologic Type:         - Epithelial-myoepithelial carcinoma     Tumor Size: 1.4 x 1.2 x 0.7 Centimeters (cm)     Tumor Depth of Invasion (DOI): At least 7 Millimeters (mm)     Tumor Focality:         - Unifocal     Accessory Findings       Lymphovascular Invasion:           - Not identified       Perineural Invasion:           - Not identified     MARGINS     Specimen Margins:         - Uninvolved by invasive tumor       Distance from Closest Margin At least 10 Millimeters (mm)     Tumor Bed (separately submitted) Margin Orientation:         - Unoriented to true margin surface     Tumor Bed (separately submitted) Margins:         - Uninvolved by invasive tumor       Distance to True Margin Surface: Cannot be determined - no tumor   present in final margins     LYMPH NODES     Number of Lymph Nodes Involved:         - 0     Number of Lymph Nodes Examined: 2     PATHOLOGIC STAGE CLASSIFICATION (PTNM, AJCC 8TH EDITION)     Primary Tumor (pT):         - pT2     Regional Lymph Nodes (pN):         - pN0       IMPRESSION AND PLAN:   Patient is a 62-year-old woman with a T4N0 epithelial-myoepithelial tumor of the hard palate. She now s/p infrastructure maxillectomy and radial forearm free flap reconstruction. She is overall doing well. We reviewed the pathology results today. Given the higher stage tumor, she is recommended for consideration of postoperative radiation.  We will arrange an appointment with radiation oncology which we will try to coordinate with her next visit. She was seen by speech pathology today and cleared for an oral diet, with removal of the NG tube.    Thank you very much for the opportunity to participate in the care of your patient.      Leslie Garza M.D.  Otolaryngology- Head & Neck Surgery          CC:  Tim Steen MD  [place] [phone]

## 2018-10-08 NOTE — TELEPHONE ENCOUNTER
Date of appointment: 10/15/18   Diagnosis/reason for appointment:Cons-clear cell carcinoma of the hard palate-  Referring provider/facility: Dr. Gutierrez   Who called: Pool message    Recent Studies  Imaging:  Pathology:  Labs:  Previous radiation (if known): No    Records in EPIC/CE    Additional information:

## 2018-10-17 ENCOUNTER — CARE COORDINATION (OUTPATIENT)
Dept: OTOLARYNGOLOGY | Facility: CLINIC | Age: 63
End: 2018-10-17

## 2018-10-17 NOTE — PROGRESS NOTES
Called and left a message for patient to discuss follow-up and rescheduling radiation consult. Received a message from patient stating that she wanted to transfer her care to [place] as she is not able to drive to the [facility] and her family is no longer willing to drive her here. Patient was encouraged to call back to discuss.     Camryn Rivera, RN, BSN

## 2018-10-23 ENCOUNTER — OFFICE VISIT (OUTPATIENT)
Dept: CARDIOLOGY | Facility: OTHER | Age: 63
End: 2018-10-23
Attending: INTERNAL MEDICINE
Payer: COMMERCIAL

## 2018-10-23 VITALS
SYSTOLIC BLOOD PRESSURE: 126 MMHG | WEIGHT: 202 LBS | HEART RATE: 80 BPM | RESPIRATION RATE: 20 BRPM | OXYGEN SATURATION: 99 % | BODY MASS INDEX: 33.66 KG/M2 | HEIGHT: 65 IN | DIASTOLIC BLOOD PRESSURE: 80 MMHG

## 2018-10-23 DIAGNOSIS — I48.0 PAROXYSMAL ATRIAL FIBRILLATION (H): ICD-10-CM

## 2018-10-23 DIAGNOSIS — C06.9 SQUAMOUS CELL CARCINOMA OF ORAL CAVITY (H): Primary | ICD-10-CM

## 2018-10-23 DIAGNOSIS — Z48.01 ENCOUNTER FOR CHANGE OR REMOVAL OF SURGICAL WOUND DRESSING: Primary | ICD-10-CM

## 2018-10-23 PROCEDURE — 99213 OFFICE O/P EST LOW 20 MIN: CPT | Performed by: INTERNAL MEDICINE

## 2018-10-23 PROCEDURE — G0463 HOSPITAL OUTPT CLINIC VISIT: HCPCS

## 2018-10-23 ASSESSMENT — PAIN SCALES - GENERAL: PAINLEVEL: NO PAIN (0)

## 2018-10-23 NOTE — NURSING NOTE
"Chief Complaint   Patient presents with     Consult     follow-up from surgery at the [facility];  Patient of Jolly Rodas NP at the [facility] in [place]       Initial /80 (BP Location: Right arm, Patient Position: Chair, Cuff Size: Adult Large)  Pulse 80  Resp 20  Ht 1.651 m (5' 5\")  Wt 91.6 kg (202 lb)  SpO2 99%  BMI 33.61 kg/m2 Estimated body mass index is 33.61 kg/(m^2) as calculated from the following:    Height as of this encounter: 1.651 m (5' 5\").    Weight as of this encounter: 91.6 kg (202 lb).  Medication Reconciliation: complete    Steffany Wallace LPN    "

## 2018-10-23 NOTE — LETTER
"10/23/2018      RE: Martita Burden  [address] [facility] [address]             Clinical Cardiac Electrophysiology    Chief Complaint: post-op atrial fibrillation     HPI: Patient was initially seen as an inpatient at [facility]. At that time:    \"Assessment & Plan    Martita Burden is a 61 yo female s/p right maxillectomy, right neck exploration, right forearm flap, split thickness skin graft on 9/20/2018.  She had paroxysmal A. fib episode but converted into normal sinus rhythm.  No signs of ACS or heart failure.     #Paroxysmal atrial fibrillation  [id number] Score is 2(gender, HTN) but it is very likely to be provoked by the recent ENT surgery. The patient is asymptomatic. Currently she is in sinus rhythm.  We would not start anticoagulation at this moment.  But if she frequently has A. fib episodes we would consider anticoagulation and HR / rhythm control strategies.      Recommendations  - ECHO, TSH(both ordered for you)  - if afib occurs, can give metoprolol for HR control  - we may consider EKG monitor at discharge\"    Today, October 23, 2018, she reports that she has been doing well since discharge.  She has had no palpitations.  She reports a small area on her left forearm that is still healing from the skin graft.         Current Outpatient Prescriptions   Medication Sig Dispense Refill     Acetaminophen (TYLENOL PO) Take 1,000 mg by mouth as needed for mild pain or fever       aspirin 325 MG EC tablet Take 325 mg by mouth every morning ON HOLD FOR SURGERY SINCE 09/10/2018       Bismuth Tribromoph-Petrolatum (XEROFORM PETROLATUM DRESSING) PADS 5x9\" dressing to forearm daily 30 each 1     Calcium-Magnesium-Zinc 333-133-5 MG TABS per tablet Take 1 tablet by mouth 2 times daily (with meals)        chlorhexidine (PERIDEX) 0.12 % solution Swish and spit 15 mLs in mouth every 8 hours 473 mL 0     lisinopril-hydrochlorothiazide (PRINZIDE/ZESTORETIC) 10-12.5 MG per tablet Take 1 " "tablet by mouth every morning        nicotine (NICODERM CQ) 14 MG/24HR 24 hr patch Place 1 patch onto the skin every 24 hours   6     order for DME Equipment being ordered: Wound care supplies, 1 each daily x 21 days  Xeroform occlusive gauze 5\" x 9\"  Telfa non-adherent pad 8\" x 3\"  Kerlix bandage roll 4-1/2\" x 4-1/8 yd  ACE wrap, 4 inch (5 total)    Diagnosis: clear cell adenocarcinoma 21 days 1     simvastatin (ZOCOR) 40 MG tablet Take 40 mg by mouth every evening         Past Medical History:   Diagnosis Date     Allergic rhinitis     Seasonal     Cancer (H) April     Hypertension 10 yrs ago       Past Surgical History:   Procedure Laterality Date     EXPLORE NECK Right 9/20/2018    Procedure: EXPLORE NECK;;  Surgeon: Leslie Garza MD;  Location: [facility] OR     GRAFT FREE VASCULARIZED (LOCATION) Right 9/20/2018    Procedure: GRAFT FREE VASCULARIZED (LOCATION);;  Surgeon: Terrence Gutierrez MD;  Location: [facility] OR     GRAFT SKIN SPLIT THICKNESS FROM EXTREMITY N/A 9/20/2018    Procedure: GRAFT SKIN SPLIT THICKNESS FROM EXTREMITY;;  Surgeon: Terrence Gutierrez MD;  Location: [facility] OR     HYSTERECTOMY       ORTHOPEDIC SURGERY  2/4/2017    Repaired broken ankle     OSTEOTOMY MAXILLA Right 9/20/2018    Procedure: OSTEOTOMY MAXILLA;  Right Maxillectomy, Right Neck Exploration, Left Forearm Free Flap, Split Thickness Skin Graft from Lower Extremity, Nasogastric Feeding Tube Placement;  Surgeon: Leslie Garza MD;  Location: [facility] OR       Family History   Problem Relation Age of Onset     Cancer Father      Cancer Brother      Hypertension Mother      Pretty much the whole family       Social History   Substance Use Topics     Smoking status: Former Smoker     Packs/day: 0.50     Years: 30.00     Types: Cigarettes     Smokeless tobacco: Never Used      Comment: currently down to 5 cigs a day     Alcohol use No       Allergies   Allergen Reactions     Penicillins      Sulfa Drugs          ROS: ten system review except as " "noted in HPI. October 23, 2018/woa    Physical Examination:  Vitals: /80 (BP Location: Right arm, Patient Position: Chair, Cuff Size: Adult Large)  Pulse 80  Resp 20  Ht 1.651 m (5' 5\")  Wt 91.6 kg (202 lb)  SpO2 99%  BMI 33.61 kg/m2  BMI= Body mass index is 33.61 kg/(m^2).    GENERAL APPEARANCE: healthy, alert and no distress  HEENT: no icterus, no xanthelasmas, normal pupil size and reaction, normal palate, mucosa moist, no cyanosis.  NECK: neck incision healing well JVP is not visible  CHEST: lungs clear to auscultation  CARDIOVASCULAR: regular rhythm, normal S1S2, no murmur or gallop, precordium quiet  EXTREMITIES: left forearm covered by dressing and ace wrap, fingers warm  NEURO: alert and oriented to person/place/time, normal speech, gait and affect    Laboratory:    Results for LUCA HOLDER (MRN [id number]) as of 10/23/2018 11:06   Ref. Range 9/26/2018 05:06   Sodium Latest Ref Range: 133 - 144 mmol/L 141   Potassium Latest Ref Range: 3.4 - 5.3 mmol/L 4.0   Chloride Latest Ref Range: 94 - 109 mmol/L 106   Carbon Dioxide Latest Ref Range: 20 - 32 mmol/L 28   Urea Nitrogen Latest Ref Range: 7 - 30 mg/dL 24   Creatinine Latest Ref Range: 0.52 - 1.04 mg/dL 0.65   GFR Estimate Latest Ref Range: >60 mL/min/1.7m2 >90   GFR Estimate If Black Latest Ref Range: >60 mL/min/1.7m2 >90   Calcium Latest Ref Range: 8.5 - 10.1 mg/dL 9.1   Anion Gap Latest Ref Range: 3 - 14 mmol/L 7   Glucose Latest Ref Range: 70 - 99 mg/dL 103 (H)   WBC Latest Ref Range: 4.0 - 11.0 10e9/L 8.7   Hemoglobin Latest Ref Range: 11.7 - 15.7 g/dL 11.2 (L)   Hematocrit Latest Ref Range: 35.0 - 47.0 % 34.5 (L)   Platelet Count Latest Ref Range: 150 - 450 10e9/L 351   RBC Count Latest Ref Range: 3.8 - 5.2 10e12/L 3.48 (L)   MCV Latest Ref Range: 78 - 100 fl 99   MCH Latest Ref Range: 26.5 - 33.0 pg 32.2   MCHC Latest Ref Range: 31.5 - 36.5 g/dL 32.5   RDW Latest Ref Range: 10.0 - 15.0 % 14.0       Assessment and recommendations:    1) " Paroxysmal atrial fibrillation -  She has no history of atrial fibrillation other than shortly after her head and neck surgery.  She has had no recurrences.  Her chads vas score is low.  At this time I would not recommend oral anticoagulation.    We discussed placing a monitor to look for asymptomatic atrial fibrillation.  She preferred to delay this.  We will re-address this issue when she returns in the spring for follow-up.  I have asked her to call if she feels a return for palpitations or has any concerns about her heart rhythm or heart in general.      I appreciate the chance to help with Mrs. Burden's care.     Portions of this note were dictated using speech recognition software. The note has been proofread but errors in the text may have been overlooked. Please contact me if there are any concerns regarding the accuracy of the dictation.         Patient seen by Dr. Diggs 10/23/2018   See note.   Emma Gutiérrez RN-BSN      Lucas Diggs MD

## 2018-10-23 NOTE — PROGRESS NOTES
"      Clinical Cardiac Electrophysiology    Chief Complaint: post-op atrial fibrillation     HPI: Patient was initially seen as an inpatient at [facility]. At that time:    \"Assessment & Plan    Martita Burden is a 63 yo female s/p right maxillectomy, right neck exploration, right forearm flap, split thickness skin graft on 9/20/2018.  She had paroxysmal A. fib episode but converted into normal sinus rhythm.  No signs of ACS or heart failure.     #Paroxysmal atrial fibrillation  [id number] Score is 2(gender, HTN) but it is very likely to be provoked by the recent ENT surgery. The patient is asymptomatic. Currently she is in sinus rhythm.  We would not start anticoagulation at this moment.  But if she frequently has A. fib episodes we would consider anticoagulation and HR / rhythm control strategies.      Recommendations  - ECHO, TSH(both ordered for you)  - if afib occurs, can give metoprolol for HR control  - we may consider EKG monitor at discharge\"    Today, October 23, 2018, she reports that she has been doing well since discharge.  She has had no palpitations.  She reports a small area on her left forearm that is still healing from the skin graft.         Current Outpatient Prescriptions   Medication Sig Dispense Refill     Acetaminophen (TYLENOL PO) Take 1,000 mg by mouth as needed for mild pain or fever       aspirin 325 MG EC tablet Take 325 mg by mouth every morning ON HOLD FOR SURGERY SINCE 09/10/2018       Bismuth Tribromoph-Petrolatum (XEROFORM PETROLATUM DRESSING) PADS 5x9\" dressing to forearm daily 30 each 1     Calcium-Magnesium-Zinc 333-133-5 MG TABS per tablet Take 1 tablet by mouth 2 times daily (with meals)        chlorhexidine (PERIDEX) 0.12 % solution Swish and spit 15 mLs in mouth every 8 hours 473 mL 0     lisinopril-hydrochlorothiazide (PRINZIDE/ZESTORETIC) 10-12.5 MG per tablet Take 1 tablet by mouth every morning        nicotine (NICODERM CQ) 14 MG/24HR 24 hr " "patch Place 1 patch onto the skin every 24 hours   6     order for DME Equipment being ordered: Wound care supplies, 1 each daily x 21 days  Xeroform occlusive gauze 5\" x 9\"  Telfa non-adherent pad 8\" x 3\"  Kerlix bandage roll 4-1/2\" x 4-1/8 yd  ACE wrap, 4 inch (5 total)    Diagnosis: clear cell adenocarcinoma 21 days 1     simvastatin (ZOCOR) 40 MG tablet Take 40 mg by mouth every evening         Past Medical History:   Diagnosis Date     Allergic rhinitis     Seasonal     Cancer (H) April     Hypertension 10 yrs ago       Past Surgical History:   Procedure Laterality Date     EXPLORE NECK Right 9/20/2018    Procedure: EXPLORE NECK;;  Surgeon: Leslie Garza MD;  Location: [facility] OR     GRAFT FREE VASCULARIZED (LOCATION) Right 9/20/2018    Procedure: GRAFT FREE VASCULARIZED (LOCATION);;  Surgeon: Terrence Gutierrez MD;  Location: [facility] OR     GRAFT SKIN SPLIT THICKNESS FROM EXTREMITY N/A 9/20/2018    Procedure: GRAFT SKIN SPLIT THICKNESS FROM EXTREMITY;;  Surgeon: Terrence Gutierrez MD;  Location: [facility] OR     HYSTERECTOMY       ORTHOPEDIC SURGERY  2/4/2017    Repaired broken ankle     OSTEOTOMY MAXILLA Right 9/20/2018    Procedure: OSTEOTOMY MAXILLA;  Right Maxillectomy, Right Neck Exploration, Left Forearm Free Flap, Split Thickness Skin Graft from Lower Extremity, Nasogastric Feeding Tube Placement;  Surgeon: Leslie Garza MD;  Location: [facility] OR       Family History   Problem Relation Age of Onset     Cancer Father      Cancer Brother      Hypertension Mother      Pretty much the whole family       Social History   Substance Use Topics     Smoking status: Former Smoker     Packs/day: 0.50     Years: 30.00     Types: Cigarettes     Smokeless tobacco: Never Used      Comment: currently down to 5 cigs a day     Alcohol use No       Allergies   Allergen Reactions     Penicillins      Sulfa Drugs          ROS: ten system review except as noted in HPI. October 23, 2018/woa    Physical Examination:  Vitals: /80 " "(BP Location: Right arm, Patient Position: Chair, Cuff Size: Adult Large)  Pulse 80  Resp 20  Ht 1.651 m (5' 5\")  Wt 91.6 kg (202 lb)  SpO2 99%  BMI 33.61 kg/m2  BMI= Body mass index is 33.61 kg/(m^2).    GENERAL APPEARANCE: healthy, alert and no distress  HEENT: no icterus, no xanthelasmas, normal pupil size and reaction, normal palate, mucosa moist, no cyanosis.  NECK: neck incision healing well JVP is not visible  CHEST: lungs clear to auscultation  CARDIOVASCULAR: regular rhythm, normal S1S2, no murmur or gallop, precordium quiet  EXTREMITIES: left forearm covered by dressing and ace wrap, fingers warm  NEURO: alert and oriented to person/place/time, normal speech, gait and affect    Laboratory:    Results for LUCA HOLDER (MRN [id number]) as of 10/23/2018 11:06   Ref. Range 9/26/2018 05:06   Sodium Latest Ref Range: 133 - 144 mmol/L 141   Potassium Latest Ref Range: 3.4 - 5.3 mmol/L 4.0   Chloride Latest Ref Range: 94 - 109 mmol/L 106   Carbon Dioxide Latest Ref Range: 20 - 32 mmol/L 28   Urea Nitrogen Latest Ref Range: 7 - 30 mg/dL 24   Creatinine Latest Ref Range: 0.52 - 1.04 mg/dL 0.65   GFR Estimate Latest Ref Range: >60 mL/min/1.7m2 >90   GFR Estimate If Black Latest Ref Range: >60 mL/min/1.7m2 >90   Calcium Latest Ref Range: 8.5 - 10.1 mg/dL 9.1   Anion Gap Latest Ref Range: 3 - 14 mmol/L 7   Glucose Latest Ref Range: 70 - 99 mg/dL 103 (H)   WBC Latest Ref Range: 4.0 - 11.0 10e9/L 8.7   Hemoglobin Latest Ref Range: 11.7 - 15.7 g/dL 11.2 (L)   Hematocrit Latest Ref Range: 35.0 - 47.0 % 34.5 (L)   Platelet Count Latest Ref Range: 150 - 450 10e9/L 351   RBC Count Latest Ref Range: 3.8 - 5.2 10e12/L 3.48 (L)   MCV Latest Ref Range: 78 - 100 fl 99   MCH Latest Ref Range: 26.5 - 33.0 pg 32.2   MCHC Latest Ref Range: 31.5 - 36.5 g/dL 32.5   RDW Latest Ref Range: 10.0 - 15.0 % 14.0       Assessment and recommendations:    1) Paroxysmal atrial fibrillation -  She has no history of atrial fibrillation " other than shortly after her head and neck surgery.  She has had no recurrences.  Her chads vas score is low.  At this time I would not recommend oral anticoagulation.    We discussed placing a monitor to look for asymptomatic atrial fibrillation.  She preferred to delay this.  We will re-address this issue when she returns in the spring for follow-up.  I have asked her to call if she feels a return for palpitations or has any concerns about her heart rhythm or heart in general.      I appreciate the chance to help with Mrs. Burden's care.     Portions of this note were dictated using speech recognition software. The note has been proofread but errors in the text may have been overlooked. Please contact me if there are any concerns regarding the accuracy of the dictation.

## 2018-10-23 NOTE — MR AVS SNAPSHOT
After Visit Summary   10/23/2018    Ms. Martita Burden    MRN: [id number]           Patient Information     Date Of Birth          1955        Visit Information        Provider Department      10/23/2018 10:30 AM Lucas Diggs MD [facility]        Today's Diagnoses     Encounter for change or removal of surgical wound dressing    -  1      Care Instructions    You were seen by Dr. Diggs, 10/23/2018.     1.  Please notify the cardiology office if you develop symptoms of atrial fibrillation, as you will need to be seen sooner.  Symptoms include racing heart, skipping beats, fatigue, shortness of breath.      2. Please continue aspirin 81 mg chewable tablet daily.  This medication reduces the risk of stroke and heart attack.     3. If you develop new or worsening symptoms please call the cardiology office as you may need to be seen sooner.       You will follow up with Dr. Diggs in April 2019.      Please call the cardiology office with problems, questions, or concerns at [phone].    If you experience chest pain, chest pressure, chest tightness, shortness of breath, fainting, lightheadedness, nausea, vomiting, or other concerning symptoms, please report to the Emergency Department or call 911. These symptoms may be emergent, and best treated in the Emergency Department.       Emma JAEGER RN-BSN  Cardiology   [facility]  [phone]              Follow-ups after your visit        Your next 10 appointments already scheduled     Apr 23, 2019  1:00 PM CDT   (Arrive by 12:45 PM)   [facility]    [address] [place] [address]   [phone]              Who to contact     If you have questions or need follow up information about today's clinic visit or your schedule please contact [facility] directly at  "[phone].  Normal or non-critical lab and imaging results will be communicated to you by [employer], letter or phone within 4 business days after the clinic has received the results. If you do not hear from us within 7 days, please contact the clinic through [employer] or phone. If you have a critical or abnormal lab result, we will notify you by phone as soon as possible.  Submit refill requests through [employer] or call your pharmacy and they will forward the refill request to us. Please allow 3 business days for your refill to be completed.          Additional Information About Your Visit        [employer] Information     [employer] gives you secure access to your electronic health record. If you see a primary care provider, you can also send messages to your care team and make appointments. If you have questions, please call your primary care clinic.  If you do not have a primary care provider, please call [phone] and they will assist you.        Care EveryWhere ID     This is your Care EveryWhere ID. This could be used by other organizations to access your [facility] medical records  [id number]        Your Vitals Were     Pulse Respirations Height Pulse Oximetry BMI (Body Mass Index)       80 20 1.651 m (5' 5\") 99% 33.61 kg/m2        Blood Pressure from Last 3 Encounters:   10/23/18 126/80   09/26/18 121/68   09/17/18 143/83    Weight from Last 3 Encounters:   10/23/18 91.6 kg (202 lb)   10/05/18 91.2 kg (201 lb)   09/26/18 92.9 kg (204 lb 12.9 oz)              Today, you had the following     No orders found for display         Today's Medication Changes          These changes are accurate as of 10/23/18 10:41 AM.  If you have any questions, ask your nurse or doctor.               Start taking these medicines.        Dose/Directions    XEROFORM PETROLATUM DRESSING Pads   Used for:  Encounter for change or removal of surgical wound dressing   Started by:  Lucas Diggs MD        5x9\" dressing to forearm " daily   Quantity:  30 each   Refills:  1            Where to get your medicines      Some of these will need a paper prescription and others can be bought over the counter.  Ask your nurse if you have questions.     Bring a paper prescription for each of these medications     XEROFORM PETROLATUM DRESSING Pads                Primary Care Provider Office Phone # Fax #    Jolly Rodas [phone]       [facility] [address] [facility] [address]        Equal Access to Services     JJ CARRION : Hadii aad ku hadasho Soomaali, waaxda luqadaha, qaybta kaalmada adeegyada, waxay idiin hayaan adeeg yung jiménez . So [facility] [phone].    ATENCIÓN: Si habla español, tiene a robison disposición servicios gratuitos de asistencia lingüística. [facility] [phone].    We comply with applicable federal civil rights laws and [place] laws. We do not discriminate on the basis of race, color, national origin, age, disability, sex, sexual orientation, or gender identity.            Thank you!     Thank you for choosing [facility]  for your care. Our goal is always to provide you with excellent care. Hearing back from our patients is one way we can continue to improve our services. Please take a few minutes to complete the written survey that you may receive in the mail after your visit with us. Thank you!             Your Updated Medication List - Protect others around you: Learn how to safely use, store and throw away your medicines at www.disposemymeds.org.          This list is accurate as of 10/23/18 10:41 AM.  Always use your most recent med list.                   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)       lisinopril-hydrochlorothiazide 10-12.5 MG per tablet    PRINZIDE/ZESTORETIC     Take 1 tablet by mouth every morning        nicotine 14 MG/24HR 24 hr patch    NICODERM CQ     Place 1 patch onto the skin every 24 hours        order for DME     21 days    Equipment being ordered: Wound care supplies, 1 each daily x 21 days Xeroform occlusive gauze 5\" x 9\" Telfa non-adherent pad 8\" x 3\" Kerlix bandage roll 4-1/2\" x 4-1/8 yd ACE wrap, 4 inch (5 total)  Diagnosis: clear cell adenocarcinoma    Clear cell carcinoma (H)       simvastatin 40 MG tablet    ZOCOR     Take 40 mg by mouth every evening        TYLENOL PO      Take 1,000 mg by mouth as needed for mild pain or fever        XEROFORM PETROLATUM DRESSING Pads     30 each    5x9\" dressing to forearm daily    Encounter for change or removal of surgical wound dressing         "

## 2018-10-23 NOTE — PATIENT INSTRUCTIONS
You were seen by Dr. Diggs, 10/23/2018.     1.  Please notify the cardiology office if you develop symptoms of atrial fibrillation, as you will need to be seen sooner.  Symptoms include racing heart, skipping beats, fatigue, shortness of breath.      2. Please continue aspirin 81 mg chewable tablet daily.  This medication reduces the risk of stroke and heart attack.     3. If you develop new or worsening symptoms please call the cardiology office as you may need to be seen sooner.       You will follow up with Dr. Diggs in April 2019.      Please call the cardiology office with problems, questions, or concerns at [phone].    If you experience chest pain, chest pressure, chest tightness, shortness of breath, fainting, lightheadedness, nausea, vomiting, or other concerning symptoms, please report to the Emergency Department or call 911. These symptoms may be emergent, and best treated in the Emergency Department.       Emma JAEGER RN-BSN  Cardiology   [facility]  [phone]

## 2018-10-29 ENCOUNTER — ONCOLOGY VISIT (OUTPATIENT)
Dept: RADIATION ONCOLOGY | Facility: HOSPITAL | Age: 63
End: 2018-10-29
Attending: RADIOLOGY
Payer: COMMERCIAL

## 2018-10-29 VITALS
RESPIRATION RATE: 16 BRPM | DIASTOLIC BLOOD PRESSURE: 76 MMHG | HEART RATE: 80 BPM | WEIGHT: 206 LBS | HEIGHT: 65 IN | SYSTOLIC BLOOD PRESSURE: 122 MMHG | BODY MASS INDEX: 34.32 KG/M2

## 2018-10-29 DIAGNOSIS — C80.1 CLEAR CELL CARCINOMA (H): Primary | ICD-10-CM

## 2018-10-29 PROCEDURE — G0463 HOSPITAL OUTPT CLINIC VISIT: HCPCS | Performed by: RADIOLOGY

## 2018-10-29 RX ORDER — LISINOPRIL/HYDROCHLOROTHIAZIDE 10-12.5 MG
1 TABLET ORAL DAILY
COMMUNITY
Start: 2018-09-04

## 2018-10-29 RX ORDER — NICOTINE 21 MG/24HR
PATCH, TRANSDERMAL 24 HOURS TRANSDERMAL
COMMUNITY
Start: 2018-04-23 | End: 2019-04-01

## 2018-10-29 RX ORDER — SIMVASTATIN 40 MG
40 TABLET ORAL AT BEDTIME
COMMUNITY
Start: 2018-09-04

## 2018-10-29 RX ORDER — MULTIVIT,TX WITH IRON,MINERALS
TABLET, EXTENDED RELEASE ORAL DAILY
COMMUNITY

## 2018-10-29 RX ORDER — MINERAL OIL/HYDROPHIL PETROLAT
OINTMENT (GRAM) TOPICAL
COMMUNITY
End: 2019-04-01

## 2018-10-29 RX ORDER — NICOTINE 21 MG/24HR
PATCH, TRANSDERMAL 24 HOURS TRANSDERMAL
Refills: 5 | COMMUNITY
Start: 2018-10-22

## 2018-10-29 RX ORDER — ACETAMINOPHEN 500 MG
1000 TABLET ORAL DAILY PRN
COMMUNITY
End: 2019-04-01

## 2018-10-29 RX ORDER — ASPIRIN 325 MG
325 TABLET ORAL DAILY
COMMUNITY

## 2018-10-29 RX ORDER — NICOTINE 21 MG/24HR
1 PATCH, TRANSDERMAL 24 HOURS TRANSDERMAL
COMMUNITY
Start: 2018-09-04 | End: 2018-10-29

## 2018-10-29 ASSESSMENT — PATIENT HEALTH QUESTIONNAIRE - PHQ9: SUM OF ALL RESPONSES TO PHQ QUESTIONS 1-9: 2

## 2018-10-29 ASSESSMENT — PAIN SCALES - GENERAL: PAINLEVEL: NO PAIN (0)

## 2018-10-29 NOTE — MR AVS SNAPSHOT
After Visit Summary   10/29/2018    Ms. Martita Burden    MRN: [id number]           Patient Information     Date Of Birth          1955        Visit Information        Provider Department      10/29/2018 10:30 AM Dorian Weaver MD [facility]        Today's Diagnoses     Clear cell carcinoma (H)    -  1       Follow-ups after your visit        Additional Services     NUTRITION REFERRAL       Your provider has referred you to: [facility] [phone]   [email or web address]    Please be aware that coverage of these services is subject to the terms and limitations of your health insurance plan.  Call member services at your health plan with any benefit or coverage questions.      Please bring the following with you to your appointment:    (1) This referral request  (2) Any documents given to you regarding this referral  (3) Any specific questions you have about diet and/or food choices                  Your next 10 appointments already scheduled     Nov 05, 2018 10:00 AM CST   Simulation with [facility]ATION   [facility] ([facility] )    [address] [id number] [phone]            Apr 23, 2019  1:00 PM CDT   (Arrive by 12:45 PM)   [facility] Visit with Lucas [facility]gs MD   [facility]    [address] [place] [address] [id number] [phone]              Who to contact     If you have questions or need follow up information about today's clinic visit or your schedule please contact [facility] directly at [phone].  Normal or non-critical lab and imaging results will be communicated to you by MyChart, letter or phone within 4 business days after the clinic has received the results. If you do not hear from us within 7 days, please contact the clinic through MyChart or phone. If you have a critical or abnormal lab result, we will notify  "you by phone as soon as possible.  Submit refill requests through [employer] or call your pharmacy and they will forward the refill request to us. Please allow 3 business days for your refill to be completed.          Additional Information About Your Visit        [employer] Information     [employer] gives you secure access to your electronic health record. If you see a primary care provider, you can also send messages to your care team and make appointments. If you have questions, please call your primary care clinic.  If you do not have a primary care provider, please call [phone] and they will assist you.        Care EveryWhere ID     This is your Care EveryWhere ID. This could be used by other organizations to access your [facility] medical records  [id number]        Your Vitals Were     Pulse Respirations Height BMI (Body Mass Index)          80 16 1.651 m (5' 5\") 34.28 kg/m2         Blood Pressure from Last 3 Encounters:   10/29/18 122/76   10/23/18 126/80   09/26/18 121/68    Weight from Last 3 Encounters:   10/29/18 93.4 kg (206 lb)   10/23/18 91.6 kg (202 lb)   10/05/18 91.2 kg (201 lb)              We Performed the Following     NUTRITION REFERRAL          Today's Medication Changes          These changes are accurate as of 10/29/18  2:32 PM.  If you have any questions, ask your nurse or doctor.               These medicines have changed or have updated prescriptions.        Dose/Directions    acetaminophen 500 MG tablet   Commonly known as:  TYLENOL   This may have changed:  Another medication with the same name was removed. Continue taking this medication, and follow the directions you see here.   Changed by:  Dorian Weaver MD        Dose:  1000 mg   1,000 mg by Nasogastric route   Refills:  0       aspirin 325 MG tablet   This may have changed:  Another medication with the same name was removed. Continue taking this medication, and follow the directions you see here.   Changed by:  Dorian Weaver MD "        Dose:  325 mg   325 mg by Nasogastric route   Refills:  0       lisinopril-hydrochlorothiazide 10-12.5 MG per tablet   Commonly known as:  PRINZIDE/ZESTORETIC   This may have changed:  Another medication with the same name was removed. Continue taking this medication, and follow the directions you see here.   Changed by:  Dorian Weaver MD        Dose:  1 tablet   Take 1 tablet by mouth   Refills:  0       * nicotine 14 MG/24HR 24 hr patch   Commonly known as:  NICODERM CQ   This may have changed:  Another medication with the same name was removed. Continue taking this medication, and follow the directions you see here.   Changed by:  Dorian Weaver MD        PLACE 1 PATCH ONTO THE SKIN EVERY 24 HOURS. DO NOT CUT PATCH; ROTATE SITES.   Refills:  0       * nicotine 21 MG/24HR 24 hr patch   Commonly known as:  NICODERM CQ   This may have changed:  Another medication with the same name was removed. Continue taking this medication, and follow the directions you see here.   Changed by:  Dorian Weaver MD        Refills:  5       simvastatin 40 MG tablet   Commonly known as:  ZOCOR   This may have changed:  Another medication with the same name was removed. Continue taking this medication, and follow the directions you see here.   Changed by:  Dorian Weaver MD        Dose:  40 mg   Take 40 mg by mouth   Refills:  0       * Notice:  This list has 2 medication(s) that are the same as other medications prescribed for you. Read the directions carefully, and ask your doctor or other care provider to review them with you.      Stop taking these medicines if you haven't already. Please contact your care team if you have questions.     chlorhexidine 0.12 % solution   Commonly known as:  PERIDEX   Stopped by:  Dorian Weaver MD                    Primary Care Provider Office Phone # Fax #    Jolly Adrianna [phone]       [facility] [address] [facility] [address]        Equal  Access to Services     [facility]: Hadii onesimo hassan roger Newell, waderekda luqadaha, qaybta kajhonnykarolina rauschjoecarlota jiménez . So [facility] [phone].    ATENCIÓN: Si habla español, tiene a robison disposición servicios gratuitos de asistencia lingüística. Llame al [phone].    We comply with applicable federal civil rights laws and [place] laws. We do not discriminate on the basis of race, color, national origin, age, disability, sex, sexual orientation, or gender identity.            Thank you!     Thank you for choosing [facility]  for your care. Our goal is always to provide you with excellent care. Hearing back from our patients is one way we can continue to improve our services. Please take a few minutes to complete the written survey that you may receive in the mail after your visit with us. Thank you!             Your Updated Medication List - Protect others around you: Learn how to safely use, store and throw away your medicines at www.disposemymeds.org.          This list is accurate as of 10/29/18  2:32 PM.  Always use your most recent med list.                   Brand Name Dispense Instructions for use Diagnosis    acetaminophen 500 MG tablet    TYLENOL     1,000 mg by Nasogastric route        aspirin 325 MG tablet      325 mg by Nasogastric route        Calcium-Magnesium-Zinc 333-133-5 MG Tabs per tablet      Take 1 tablet by mouth 2 times daily (with meals)        lisinopril-hydrochlorothiazide 10-12.5 MG per tablet    PRINZIDE/ZESTORETIC     Take 1 tablet by mouth        magnesium gluconate 250 MG tablet    MAGONATE          mineral oil-hydrophilic petrolatum           * nicotine 14 MG/24HR 24 hr patch    NICODERM CQ     PLACE 1 PATCH ONTO THE SKIN EVERY 24 HOURS. DO NOT CUT PATCH; ROTATE SITES.        * nicotine 21 MG/24HR 24 hr patch    NICODERM CQ          order for DME     21 days    Equipment being ordered: Wound care supplies, 1 each daily x 21 days  "Xeroform occlusive gauze 5\" x 9\" Telfa non-adherent pad 8\" x 3\" Kerlix bandage roll 4-1/2\" x 4-1/8 yd ACE wrap, 4 inch (5 total)  Diagnosis: clear cell adenocarcinoma    Clear cell carcinoma (H)       simvastatin 40 MG tablet    ZOCOR     Take 40 mg by mouth        XEROFORM PETROLATUM DRESSING Pads     30 each    5x9\" dressing to forearm daily    Encounter for change or removal of surgical wound dressing       * Notice:  This list has 2 medication(s) that are the same as other medications prescribed for you. Read the directions carefully, and ask your doctor or other care provider to review them with you.      "

## 2018-10-29 NOTE — PROGRESS NOTES
"[facility]  Nutrition Assessment    Martita Burden    1955   Oct 29, 2018    Diagnosis: Clear Cell Carcinoma of Maxilla    Height: 5' 5\" Weight: 206 lbs 0 oz    Usual Weight: 206lbs Weight Change: 0      Past Medical History:   Diagnosis Date     Allergic rhinitis     Seasonal     Cancer (H) April     Hypertension 10 yrs ago       1. Receiving Chemotherapy? No - 0 points  2. Weight Loss per Month (in pounds) Based on Usual Weight: none    100# 100-120# 120-150# 150-200# >200# Pt. System   > 5 5 - 6 6 - 8 7 - 10 > 10 1 Point   > 7 7- 9 9 - 11 11 - 14 > 15 2 Points   > 10 10 - 12 12 - 15 15 - 20 > 20 3 Points       3. Eating Problems: ( 1 Point for Each Problem)       Loss of Appetite     No - 0 points    Difficulty Swallowing    No - 0 points   Nausea    No - 0 points    Early Satiety    No - 0 points   Vomiting    No - 0 points    Taste/Smell Aversions  No - 0 points    Diarrhea    No - 0 points    Esophageal Reflux   No - 0 points   Mouth Soreness/Difficulty Chewing  No - 0 points          Total: 0    4.  Automatic Referral for the Following Diagnosis or Situations: Head & Neck Cancer - Dietician Consult ordered     Comments: n/a    Total Score: 0 (Scores >/= 4 will receive a Dietician Consult)        Meek Manzanares RN    "

## 2018-10-29 NOTE — PROGRESS NOTES
"INITIAL PATIENT ASSESSMENT    Chief Complaint   Patient presents with     Radiation Therapy       Initial /76 (BP Location: Right arm, Patient Position: Chair, Cuff Size: Adult Regular)  Pulse 80  Resp 16  Wt 93.4 kg (206 lb)  BMI 34.28 kg/m2 Estimated body mass index is 34.28 kg/(m^2) as calculated from the following:    Height as of 10/23/18: 1.651 m (5' 5\").    Weight as of this encounter: 93.4 kg (206 lb).  Medication Reconciliation: complete    Referring Physician: Terrence Gutierrez MD ([facility])  Other Physicians: DEV Farah    Diagnosis: clear cell carcinoma of maxilla    Prior radiation therapy: None    Prior chemotherapy: None    Prior hormonal therapy:N/A    Pain Eval:  Denies    Psychosocial  Marital Status:    Spouse/Significant other: n/a   Children: 2   Occupation:  at [employer]/    Retired: Yes  Living arrangements: daughter lives with her  Do you feel safe at home? Yes  Activity status: independent   referral needs: Not needed    Advanced Directive: No    Breast Exam: never  Mammogram: never  Last Pap: age 26  : 2  Para: 2  Onset of menarche: 13  LMP: No LMP recorded. Patient has had a hysterectomy.  Onset of menopause: age 26  Abnormal vaginal bleeding/discharge: No  Are you pregnant? No  Reproductive note: hysterectomy    Patient was assessed for the influenza, pneumo-poly, prevnar 13, Tdap, and shingles immunizations. \"Vaccinations and Cancer Treatment\" flyer given.  Instructed patient to discuss vaccination status with his/her PCM.     Patient was assessed using the NCCN psychosocial distress thermometer. Patient rated the score as a 0.5/10. Patient rated current stressors as \"getting here when the roads are bad\". Stressors will be brought to the attention of provider or Oncology RN Care Coordinator for a score of 6 or greater or per nurses discretion.   Pt is here today for a consult for radiation therapy for oral cavity cancer.  " Educated patient on the mapping process and the possible side effects of XRT to the head/neck, to include: fatigue, skin reaction, taste changes, dry mouth, mouth sores, sore throat, difficulty swallowing.  Pt verbalizes an understanding and has no questions at this time. Pt is accompanied by her daughter today.    Meek Manzanares RN

## 2018-10-29 NOTE — CONSULTS
Consult Date:  10/29/2018      RADIATION THERAPY CONSULTATION      REFERRING PROVIDERS:  Leslie Garza MD; Jolly Rodas NP.      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      HISTORY OF PRESENT ILLNESS:  The patient has perceived a mass in the roof of the mouth on the right side for about one year and a half.  She actually does not feel that it has changed much over that period of time.  It has not caused her any pain or any nutritional complications.  She has brought this to medical attention and eventually was referred to Dr. Steen at [facility].  Biopsy was obtained which did reveal final diagnosis of a clear cell epithelial neoplasm.  She was referred to the [place] apparently because of insurance reasons.  A biopsy was obtained from the mucosal side in the right superior gingival region which revealed squamous carcinoma without any dysplasia or evidence of malignancy.  She then underwent by Dr. Garza an extensive surgical procedure including an infrastructure maxillectomy with lateral rhinotomy, right neck exploration which did support the diagnosis of malignancy extending from the roof of the mouth through the hard palate into the right lateral nasal cavity.  There were several nodes obtained as well which were negative.  Final histology was thought to be a myoepithelial carcinoma, low-grade, no angiolymphatic or perineural invasion.  Initially the pathology report stage of this is a pT2, but due to clear invasion through the palate into the nasal cavity, it would be staged T4a.  Again, histologic grade relatively low.  The surgical defect was closed with a left forearm free flap, split thickness skin graft from the lower extremity as well.  Her recovery has been remarkably good.  Her surgery was on 09/20/2018, and she is almost asymptomatic at this point, maintaining adequate nutrition and stable weight.      PAST MEDICAL HISTORY:  Hysterectomy, left  ankle fracture.      INTERCURRENT MEDICAL ILLNESSES:  Hypertension; no known significant vascular disease, history of diabetes, MI, CVA, claudication.      ALLERGIES:  PENICILLIN AND SULFA.      REPRODUCTIVE HISTORY:   2, para 2.  Menarche age 13.  Menopause surgical at age 26.      REVIEW OF SYSTEMS:  No diplopia, headaches, dizziness, loss of consciousness.  No swallowing difficulties, odynophagia or dysphagia.  No thermal or temperature intolerance.  No nausea, vomiting, heartburn.  No abdominal complaints, constipation, bloating, diarrhea.  She denies significant pain.  Energy level normal.  Diet general.  Weight stable.      FAMILY HISTORY:  Brother with melanoma.  Father with prostate carcinoma.      HABITS:  Alcohol use negative.  Tobacco 2-1/2 packs per day x 30 years, quit recently.      SOCIAL AND DEMOGRAPHIC:  The patient is  and has 2 adult offspring.  She has worked as a  at [employer] and a .  Lives at home with her daughter.      PHYSICAL EXAMINATION:   GENERAL:  Reveals a cooperative, very healthy-appearing female in no acute distress.   VITAL SIGNS:  Weight is 206 pounds.  Blood pressure 122/76, heart rate 80.   HEENT:  Extraocular movements full.  Pupils are equal, round, reactive.  Face is symmetric.  Tongue is symmetric and protrudes midline.  Palate is intact on the left.  On the right side, the alveolar gingiva is surgically removed as is the alveolar bone.  The graft is noted, healing very well in the right roof of mouth palatal region.   NECK:  Supple without adenopathy.  All incisions are clean and well-healed.   LUNGS:  Clear to auscultation.   HEART:  Cardiac exam reveals regular rate and rhythm without murmurs or extra sounds.   ABDOMEN:  Nontender, without appreciable organomegaly.  No inguinal lymphadenopathy.   EXTREMITIES:  Extremity strength is intact.  Deep tendon reflexes symmetric.      RADIOGRAPHIC FINDINGS:  Preoperative CT reveals a 1.7 x  1.1 x 1.8 cm mass at the hard palate and floor of the maxilla appearing to extend into the nasal cavity contacting the inferior turbinate.  PET CT was also obtained which does show some abnormal uptake associated with the right maxilla, floor of mouth, nasal cavity.      IMPRESSION:  Well-differentiated myoepithelial carcinoma essentially involving the roof of the mouth extending through the hard palate and into the nasal cavity on the right.  Margins were negative.  Tumor was fairly low-grade.  I did discuss with the patient that there is very little data to guide us in terms of therapeutic approach at this point.  Certainly if this were a squamous mucosal tumor growing through the floor of the maxilla, radiation therapy would clearly be indicated.  With this less typical histology, however, the data is limited in terms of potential benefit with external adjuvant radiation therapy.  I think effect on outcome is somewhat unknown.  I had a full discussion with the patient disclosing these issues.  I did ask her if she were the type of person who would like to do everything she possibly can to decrease her odds of recurrence.  She did acknowledge that she would and understands the somewhat questionable nature of potential effect of this treatment on her outcome.  We discussed a course of treatment approximately 6 weeks, daily weekday outpatient treatment with side effects of mucosal irritation very unlikely affecting the viability of her graft, although she did acknowledge herself that she understood this could happen, possibly a bit of xerostomia, which should be minimized I believe with IMRT techniques.  I also described the rare occurrence of bone necrosis.  I think the bulk of the treatment volume can be kept away from the mandible.  She also can expect minor skin erythema unlikely desquamation as a radiation dermatitis side effect.  All in all, she is well informed and wishes to proceed.      PLAN:  Appropriate  scheduling will be accomplished for IMRT-based treatment planning.         MARTIN ROWE MD             D: 10/29/2018   T: 10/29/2018   MT: VARUN      Name:     LUCA HOLDER   MRN:      [id number]        Account:       [id number]   :      1955           Consult Date:  10/29/2018      Document: [id number]       cc: Jolly Garza MD

## 2018-11-05 ENCOUNTER — HOSPITAL ENCOUNTER (OUTPATIENT)
Dept: EDUCATION SERVICES | Facility: HOSPITAL | Age: 63
Discharge: HOME | End: 2018-11-05
Attending: RADIOLOGY | Admitting: RADIOLOGY
Payer: COMMERCIAL

## 2018-11-05 ENCOUNTER — HOSPITAL ENCOUNTER (OUTPATIENT)
Dept: SPEECH THERAPY | Facility: HOSPITAL | Age: 63
Setting detail: THERAPIES SERIES
End: 2018-11-05
Attending: RADIOLOGY
Payer: COMMERCIAL

## 2018-11-05 ENCOUNTER — ALLIED HEALTH/NURSE VISIT (OUTPATIENT)
Dept: RADIATION ONCOLOGY | Facility: HOSPITAL | Age: 63
End: 2018-11-05
Attending: RADIOLOGY
Payer: COMMERCIAL

## 2018-11-05 DIAGNOSIS — C80.1 CLEAR CELL CARCINOMA (H): Primary | ICD-10-CM

## 2018-11-05 PROCEDURE — 97802 MEDICAL NUTRITION INDIV IN: CPT | Performed by: DIETITIAN, REGISTERED

## 2018-11-05 PROCEDURE — 77334 RADIATION TREATMENT AID(S): CPT | Performed by: RADIOLOGY

## 2018-11-05 PROCEDURE — 77290 THER RAD SIMULAJ FIELD CPLX: CPT | Performed by: RADIOLOGY

## 2018-11-05 PROCEDURE — 40000268 ZZH STATISTIC NO CHARGES

## 2018-11-05 NOTE — PROGRESS NOTES
Patient was seen for swallow prehab and educated on the effects of chemo and radiation on swallowing. Patient was given handouts and folder on swallowing exercises, how to cope with various effects of chemo/radiation as it pertains to swallowing, and different diet modifications that can be made. Patient was given a chance to ask questions and verbalized understanding of all material. Patient was told to call the speech therapy department if she had any questions or started to notice that she was having difficulties swallowing.

## 2018-11-05 NOTE — MR AVS SNAPSHOT
After Visit Summary   11/5/2018    Ms. Martita Burden    MRN: [id number]           Patient Information     Date Of Birth          1955        Visit Information        Provider Department      11/5/2018 10:00 AM HI SIMULATION [facility] Radiation Oncology        Today's Diagnoses     Clear cell carcinoma (H)    -  1       Follow-ups after your visit        Your next 10 appointments already scheduled     Nov 05, 2018  2:30 PM CST   (Arrive by 2:15 PM)   New Nutrition with Ruby Medina RD   [facility] Diabetes Education ([facility] )    [address] [place] [address]   [phone]            Nov 19, 2018 10:45 AM CST   Treatment with [facility]   [facility] Radiation Oncology ([facility] )    [address] [address]   [phone]            Nov 19, 2018 11:30 AM CST   Treatment with [facility]   [facility] Radiation Oncology ([facility] )    [address] [address]   [phone]            Nov 20, 2018 11:30 AM CST   Treatment with [facility]   [facility] Radiation Oncology ([facility] )    [address] [address]   [phone]            Nov 21, 2018 11:30 AM CST   Treatment with [facility]   [facility] Radiation Oncology ([facility] )    [address] [address]   [phone]            Nov 26, 2018 11:30 AM CST   Treatment with [facility] CL[facility] IX   [facility] Radiation Oncology ([facility] )    [address] [address]   [phone]            Nov 26, 2018 11:45 AM CST   Treatment with [facility] CL[facility] IX   [facility] Radiation Oncology ([facility] )    [address] [address]   [phone]            Nov 27, 2018 11:30 AM CST   Treatment with [facility] IX   [facility] Radiation Oncology ([facility] )    [address] [address]   [phone]            Nov 28, 2018 11:30 AM CST   Treatment with [facility] CL[facility] IX   [facility]  Radiation Oncology ([facility] )    [address] [address] [phone]            Nov 29, 2018 11:30 AM CST   Treatment with [facility] CLIN[facility] IX   [facility] Radiation Oncology ([facility] )    [address] [address] [phone]              Who to contact     If you have questions or need follow up information about today's clinic visit or your schedule please contact [facility] RAD[facility] directly at [phone].  Normal or non-critical lab and imaging results will be communicated to you by [employer], letter or phone within 4 business days after the clinic has received the results. If you do not hear from us within 7 days, please contact the clinic through [employer] or phone. If you have a critical or abnormal lab result, we will notify you by phone as soon as possible.  Submit refill requests through O[employer]Y or call your pharmacy and they will forward the refill request to us. Please allow 3 business days for your refill to be completed.          Additional Information About Your Visit        OTOY Information     OTOY gives you secure access to your electronic health record. If you see a primary care provider, you can also send messages to your care team and make appointments. If you have questions, please call your primary care clinic.  If you do not have a primary care provider, please call [phone] and they will assist you.        Care EveryWhere ID     This is your Care EveryWhere ID. This could be used by other organizations to access your [facility] [place] medical records  [id number]         Blood Pressure from Last 3 Encounters:   10/29/18 122/76   10/23/18 126/80   09/26/18 121/68    Weight from Last 3 Encounters:   10/29/18 93.4 kg (206 lb)   10/23/18 91.6 kg (202 lb)   10/05/18 91.2 kg (201 lb)              Today, you had the following     No orders found for display       Primary Care Provider Office Phone # Fax #    Jolly Adrianna [phone]  [phone]        [address] [facility] [address]        Equal Access to Services     JJ CARRION : Hadii aad ku hadsaundraharpal Osiris, waderekda lukonradtimha, benjita kajhonnyda geovannydarlene, karolina solorzano belemjorge smallcharlotte barragan jessy urena. So [facility] [phone].    ATENCIÓN: Si habla español, tiene a robison disposición servicios gratuitos de asistencia lingüística. Daniellaame al [phone].    We comply with applicable federal civil rights laws and [place] laws. We do not discriminate on the basis of race, color, national origin, age, disability, sex, sexual orientation, or gender identity.            Thank you!     Thank you for choosing [facility]  for your care. Our goal is always to provide you with excellent care. Hearing back from our patients is one way we can continue to improve our services. Please take a few minutes to complete the written survey that you may receive in the mail after your visit with us. Thank you!             Your Updated Medication List - Protect others around you: Learn how to safely use, store and throw away your medicines at www.disposemymeds.org.          This list is accurate as of 11/5/18 11:22 AM.  Always use your most recent med list.                   Brand Name Dispense Instructions for use Diagnosis    acetaminophen 500 MG tablet    TYLENOL     1,000 mg by Nasogastric route        aspirin 325 MG tablet      325 mg by Nasogastric route        Calcium-Magnesium-Zinc 333-133-5 MG Tabs per tablet      Take 1 tablet by mouth 2 times daily (with meals)        lisinopril-hydrochlorothiazide 10-12.5 MG per tablet    PRINZIDE/ZESTORETIC     Take 1 tablet by mouth        magnesium gluconate 250 MG tablet    MAGONATE          mineral oil-hydrophilic petrolatum           * nicotine 14 MG/24HR 24 hr patch    NICODERM CQ     PLACE 1 PATCH ONTO THE SKIN EVERY 24 HOURS. DO NOT CUT PATCH; ROTATE SITES.        * nicotine 21 MG/24HR 24 hr patch    NICODERM CQ          order for DME     21  "days    Equipment being ordered: Wound care supplies, 1 each daily x 21 days Xeroform occlusive gauze 5\" x 9\" Telfa non-adherent pad 8\" x 3\" Kerlix bandage roll 4-1/2\" x 4-1/8 yd ACE wrap, 4 inch (5 total)  Diagnosis: clear cell adenocarcinoma    Clear cell carcinoma (H)       simvastatin 40 MG tablet    ZOCOR     Take 40 mg by mouth        XEROFORM PETROLATUM DRESSING Pads     30 each    5x9\" dressing to forearm daily    Encounter for change or removal of surgical wound dressing       * Notice:  This list has 2 medication(s) that are the same as other medications prescribed for you. Read the directions carefully, and ask your doctor or other care provider to review them with you.      "

## 2018-11-06 NOTE — PROGRESS NOTES
Service Date: 2018      RADIATION THERAPY SIMULATION NOTE      The patient was brought into the simulation suite and placed in a modified supine position.  Careful alignment was accomplished using orthogonal lasers.  Positioning was aided with the use of an Aquaplast mask.  Imaging was acquired through the oral cavity and sinus region.  All imaging will be used for highly conformal IMRT-based treatment planning for postoperative adjuvant management of an oral cavity carcinoma, status post surgical resection.  IMRT techniques will be required for proper target dose delineation with adequate sparing of eyes, brain stem and spinal cord.         MARTIN ROWE MD             D: 2018   T: 2018   MT: DOT      Name:     LUCA HOLDER   MRN:      [id number]        Account:      [id number]   :      1955           Service Date: 2018      Document: [id number]

## 2018-11-13 PROCEDURE — 77300 RADIATION THERAPY DOSE PLAN: CPT | Performed by: RADIOLOGY

## 2018-11-13 PROCEDURE — 77295 3-D RADIOTHERAPY PLAN: CPT | Performed by: RADIOLOGY

## 2018-11-13 PROCEDURE — 77334 RADIATION TREATMENT AID(S): CPT | Performed by: RADIOLOGY

## 2018-11-19 ENCOUNTER — TELEPHONE (OUTPATIENT)
Dept: CARDIOLOGY | Facility: OTHER | Age: 63
End: 2018-11-19

## 2018-11-19 ENCOUNTER — TRANSFERRED RECORDS (OUTPATIENT)
Dept: HEALTH INFORMATION MANAGEMENT | Facility: HOSPITAL | Age: 63
End: 2018-11-19

## 2018-11-19 ENCOUNTER — APPOINTMENT (OUTPATIENT)
Dept: GENERAL RADIOLOGY | Facility: OTHER | Age: 63
End: 2018-11-19
Attending: INTERNAL MEDICINE
Payer: COMMERCIAL

## 2018-11-19 ENCOUNTER — ALLIED HEALTH/NURSE VISIT (OUTPATIENT)
Dept: RADIATION ONCOLOGY | Facility: HOSPITAL | Age: 63
End: 2018-11-19

## 2018-11-19 DIAGNOSIS — I48.0 PAROXYSMAL ATRIAL FIBRILLATION (H): ICD-10-CM

## 2018-11-19 DIAGNOSIS — C80.1 CLEAR CELL CARCINOMA (H): Primary | ICD-10-CM

## 2018-11-19 DIAGNOSIS — R07.89 CHEST DISCOMFORT: ICD-10-CM

## 2018-11-19 DIAGNOSIS — R07.89 CHEST DISCOMFORT: Primary | ICD-10-CM

## 2018-11-19 LAB — TROPONIN I SERPL-MCNC: <0.015 UG/L (ref 0–0.04)

## 2018-11-19 PROCEDURE — 93010 ELECTROCARDIOGRAM REPORT: CPT | Performed by: INTERNAL MEDICINE

## 2018-11-19 PROCEDURE — 77280 THER RAD SIMULAJ FIELD SMPL: CPT | Performed by: RADIOLOGY

## 2018-11-19 PROCEDURE — 77387 GUIDANCE FOR RADJ TX DLVR: CPT | Performed by: RADIOLOGY

## 2018-11-19 PROCEDURE — 77412 RADIATION TX DELIVERY LVL 3: CPT | Performed by: RADIOLOGY

## 2018-11-19 PROCEDURE — 36415 COLL VENOUS BLD VENIPUNCTURE: CPT | Mod: ZL | Performed by: INTERNAL MEDICINE

## 2018-11-19 PROCEDURE — 93005 ELECTROCARDIOGRAM TRACING: CPT

## 2018-11-19 PROCEDURE — 84484 ASSAY OF TROPONIN QUANT: CPT | Mod: ZL | Performed by: INTERNAL MEDICINE

## 2018-11-19 NOTE — TELEPHONE ENCOUNTER
"ECG was reviewed by Dr. Gregory and is being scanned into EPIC at this time for Dr. Diggs's review.   Patient had troponin lab done and it is \"in process\".    Please review ECG and troponin results and advise.  Patient went to Radiation therapy but will return to clinic after her treatment to get results.    "

## 2018-11-19 NOTE — TELEPHONE ENCOUNTER
Lucas Diggs MD   You 20 minutes ago (8:43 AM)                 While she's there today have her get an ECG and a troponin.     Thanks  (Routing comment)

## 2018-11-19 NOTE — TELEPHONE ENCOUNTER
ECG and troponin lab results are in EPIC.  Please review and advise of any further recommendations.     Patient is scheduled for an appointment with Dr. Diggs tomorrow 11/20/2018 at 3 pm.

## 2018-11-19 NOTE — TELEPHONE ENCOUNTER
Attempted to call patient two more times with no answer.  Message left to return call to inform her of note recorded below per Dr. Diggs.

## 2018-11-19 NOTE — TELEPHONE ENCOUNTER
Per Dr. Diggs phone message troponin ok and ECG as well.  Ok for patient to leave.  Patient informed and she has no further questions.  Advised patient that Dr. Diggs will see her at her appointment tomorrow or that she should be seen sooner in ER in [place] PR[place] if any further concerning or worsening symptoms.  Patient states verbal understanding.

## 2018-11-19 NOTE — TELEPHONE ENCOUNTER
Lucas Diggs MD   You 17 minutes ago (9:42 AM)                 Orders in, she can have it done in [place]. (Routing comment)

## 2018-11-19 NOTE — TELEPHONE ENCOUNTER
"Patient called requesting an appointment with Dr. Diggs.  Patient states that Dr. Diggs advised her to schedule a follow-up appointment if she feels like she is going into afib again or for any concerning symptoms.  Patient states she is questioning \"if she is going into a-fib again because she is having heaviness in her chest some days\". Patient is currently having some chest heaviness but denies any chest pain, no shortness of breath, no nausea or vomiting, no dizziness.  Patient states that she did sleep elevated at 45 degrees last night but she's unsure if it is from her sinuses.  Patient offered an appointment tomorrow at 3pm with Dr. Diggs.  Informed patient if she experiences any worsening or concerning symptoms to go to ER or Urgent Care sooner.  Advised patient that we would return a call today with any further recommendations.  Patient states that she will be in [place] today at 11am for her radiation appointment.  Patient states that her daughter will be driving her and that it is ok to leave a message because she has poor cell service at sometimes from the [place] to [place] [address].  Please advise.  "

## 2018-11-19 NOTE — TELEPHONE ENCOUNTER
Patient returned call and informed of note recorded below per Dr. Diggs.  Patient states that she will come up to the clinic now to get labs and EKG done.  Informed patient that Dr. Gregory will review the results.

## 2018-11-19 NOTE — TELEPHONE ENCOUNTER
Dr. Diggs, would you put the orders in for her to have this done at the [facility] or should she go to the ER/ Urgent care to get this done?  Please advise.      Message left for patient to return a call to arrange her getting an ECG and labs done while she is in [place] today.

## 2018-11-19 NOTE — MR AVS SNAPSHOT
After Visit Summary   11/19/2018    Ms. Martita Burden    MRN: [id number]           Patient Information     Date Of Birth          1955        Visit Information        Provider Department      11/19/2018 11:30 AM [facility]        Today's Diagnoses     Clear cell carcinoma (H)    -  1       Follow-ups after your visit        Your next 10 appointments already scheduled     Nov 20, 2018 11:30 AM CST   Treatment with [facility]   [facility] ([facility] )    [address] [address]   [phone]            Nov 20, 2018  3:00 PM CST   (Arrive by 2:45 PM)   [facility]    [address] [facility] [address]   [phone]            Nov 21, 2018 11:30 AM CST   Treatment with [facility]   [facility] ([facility] )    [address] [address]   [phone]            Nov 21, 2018 12:00 PM CST   on treatment visit with Dorian Weaver MD   [facility] ([facility] )    [address] [address]   [phone]            Nov 26, 2018 11:30 AM CST   Treatment with [facility]   [facility] ([facility] )    [address] [address]   [phone]            Nov 26, 2018 11:45 AM CST   Treatment with [facility]   [facility] ([facility] )    [address] [address]   [phone]            Nov 27, 2018 11:30 AM CST   Treatment with [facility]   [facility] ([facility] )    [address] [address]   [phone]            Nov 28, 2018 11:30 AM CST   Treatment with [facility]   [facility] ([facility] )    [address] [address]   [phone]            Nov 29, 2018 11:30 AM CST    Treatment with [facility]   [facility] ([facility] )    [address] [address] [phone]            Nov 30, 2018 11:30 AM CST   Treatment with [facility]   [facility] ([facility] )    [address] [address] [phone]              Who to contact     If you have questions or need follow up information about today's clinic visit or your schedule please contact [facility] directly at [phone].  Normal or non-critical lab and imaging results will be communicated to you by Zovahart, letter or phone within 4 business days after the clinic has received the results. If you do not hear from us within 7 days, please contact the clinic through [employer] or phone. If you have a critical or abnormal lab result, we will notify you by phone as soon as possible.  Submit refill requests through [employer] or call your pharmacy and they will forward the refill request to us. Please allow 3 business days for your refill to be completed.          Additional Information About Your Visit        [employer]ated [employer]k Information     [employer] gives you secure access to your electronic health record. If you see a primary care provider, you can also send messages to your care team and make appointments. If you have questions, please call your primary care clinic.  If you do not have a primary care provider, please call [phone] and they will assist you.        Care EveryWhere ID     This is your Care EveryWhere ID. This could be used by other organizations to access your [facility] medical records  [id number]         Blood Pressure from Last 3 Encounters:   10/29/18 122/76   10/23/18 126/80   09/26/18 121/68    Weight from Last 3 Encounters:   10/29/18 93.4 kg (206 lb)   10/23/18 91.6 kg (202 lb)   10/05/18 91.2 kg (201 lb)              Today, you had the following     No orders found for display       Primary Care Provider Office Phone # Fah  #    [facility] [phone]       [facility] [address] [facility] [address]        Equal Access to Services     JJ CARRION : Hadii aad ku hadraghav Sosean, waderekda luqadaha, qaybta kaalmada timur, karolina rafaelin hayaajorge smallcharlotte barragan jessy urena. So [facility] [phone].    ATENCIÓN: Si habla español, tiene a robison disposición servicios gratuitos de asistencia lingüística. Llame al [phone].    We comply with applicable federal civil rights laws and [place] laws. We do not discriminate on the basis of race, color, national origin, age, disability, sex, sexual orientation, or gender identity.            Thank you!     Thank you for choosing [facility]  for your care. Our goal is always to provide you with excellent care. Hearing back from our patients is one way we can continue to improve our services. Please take a few minutes to complete the written survey that you may receive in the mail after your visit with us. Thank you!             Your Updated Medication List - Protect others around you: Learn how to safely use, store and throw away your medicines at www.disposemymeds.org.          This list is accurate as of 11/19/18 12:11 PM.  Always use your most recent med list.                   Brand Name Dispense Instructions for use Diagnosis    acetaminophen 500 MG tablet    TYLENOL     1,000 mg by Nasogastric route        aspirin 325 MG tablet      325 mg by Nasogastric route        Calcium-Magnesium-Zinc 333-133-5 MG Tabs per tablet      Take 1 tablet by mouth 2 times daily (with meals)        lisinopril-hydrochlorothiazide 10-12.5 MG per tablet    PRINZIDE/ZESTORETIC     Take 1 tablet by mouth        magnesium gluconate 250 MG tablet    MAGONATE          mineral oil-hydrophilic petrolatum           * nicotine 14 MG/24HR 24 hr patch    NICODERM CQ     PLACE 1 PATCH ONTO THE SKIN EVERY 24 HOURS. DO NOT CUT PATCH; ROTATE SITES.        * nicotine 21 MG/24HR 24 hr patch    NICODERM  "CQ          order for DME     21 days    Equipment being ordered: Wound care supplies, 1 each daily x 21 days Xeroform occlusive gauze 5\" x 9\" Telfa non-adherent pad 8\" x 3\" Kerlix bandage roll 4-1/2\" x 4-1/8 yd ACE wrap, 4 inch (5 total)  Diagnosis: clear cell adenocarcinoma    Clear cell carcinoma (H)       simvastatin 40 MG tablet    ZOCOR     Take 40 mg by mouth        XEROFORM PETROLATUM DRESSING Pads     30 each    5x9\" dressing to forearm daily    Encounter for change or removal of surgical wound dressing       * Notice:  This list has 2 medication(s) that are the same as other medications prescribed for you. Read the directions carefully, and ask your doctor or other care provider to review them with you.      "

## 2018-11-20 ENCOUNTER — ALLIED HEALTH/NURSE VISIT (OUTPATIENT)
Dept: RADIATION ONCOLOGY | Facility: HOSPITAL | Age: 63
End: 2018-11-20

## 2018-11-20 ENCOUNTER — OFFICE VISIT (OUTPATIENT)
Dept: CARDIOLOGY | Facility: OTHER | Age: 63
End: 2018-11-20
Attending: INTERNAL MEDICINE
Payer: COMMERCIAL

## 2018-11-20 VITALS
BODY MASS INDEX: 34.16 KG/M2 | DIASTOLIC BLOOD PRESSURE: 79 MMHG | HEIGHT: 65 IN | HEART RATE: 80 BPM | OXYGEN SATURATION: 98 % | SYSTOLIC BLOOD PRESSURE: 123 MMHG | WEIGHT: 205 LBS | RESPIRATION RATE: 16 BRPM

## 2018-11-20 DIAGNOSIS — C80.1 CLEAR CELL CARCINOMA (H): Primary | ICD-10-CM

## 2018-11-20 DIAGNOSIS — R07.9 EXERTIONAL CHEST PAIN: ICD-10-CM

## 2018-11-20 DIAGNOSIS — R00.2 PALPITATIONS: Primary | ICD-10-CM

## 2018-11-20 DIAGNOSIS — I48.0 PAROXYSMAL ATRIAL FIBRILLATION (H): ICD-10-CM

## 2018-11-20 PROCEDURE — 77387 GUIDANCE FOR RADJ TX DLVR: CPT | Performed by: RADIOLOGY

## 2018-11-20 PROCEDURE — 99215 OFFICE O/P EST HI 40 MIN: CPT | Performed by: INTERNAL MEDICINE

## 2018-11-20 PROCEDURE — 77412 RADIATION TX DELIVERY LVL 3: CPT | Performed by: RADIOLOGY

## 2018-11-20 PROCEDURE — G0463 HOSPITAL OUTPT CLINIC VISIT: HCPCS

## 2018-11-20 ASSESSMENT — PAIN SCALES - GENERAL: PAINLEVEL: NO PAIN (0)

## 2018-11-20 NOTE — LETTER
"11/20/2018      RE: Martita Burden  [address]  [facility] [address]             Clinical Cardiac Electrophysiology    Chief Complaint: post-op atrial fibrillation     HPI: Patient was initially seen as an inpatient at [facility]. At that time:    \"Assessment & Plan    Martita Burden is a 61 yo female s/p right maxillectomy, right neck exploration, right forearm flap, split thickness skin graft on 9/20/2018.  She had paroxysmal A. fib episode but converted into normal sinus rhythm.  No signs of ACS or heart failure.     #Paroxysmal atrial fibrillation  [id number] Score is 2(gender, HTN) but it is very likely to be provoked by the recent ENT surgery. The patient is asymptomatic. Currently she is in sinus rhythm.  We would not start anticoagulation at this moment.  But if she frequently has A. fib episodes we would consider anticoagulation and HR / rhythm control strategies.      Recommendations  - ECHO, TSH(both ordered for you)  - if afib occurs, can give metoprolol for HR control  - we may consider EKG monitor at discharge\"    Today, October 23, 2018, she reports that she has been doing well since discharge.  She has had no palpitations.  She reports a small area on her left forearm that is still healing from the skin graft.     November 20, 2018 Interval history: She reports no palpitations but she has noticed some increase shortness of breath with activities.  She is not sure if her heart is racing during these episodes or not.  This is associated with the mild sensation of chest discomfort or perhaps pressure.  The discomfort does not last very long.  It is relieved with a few minutes of rest.  She reports no previous history of discomfort such as this.  She is just started her radiation therapy this week.  She will be staying here inhibiting for her daily treatments, Monday through Friday, for the next 5 weeks.    She had a troponin checked yesterday which was normal at less than " "0.015.  I also asked her to obtain an ECG yesterday.  That was done and I have personally reviewed that tracing.  It shows sinus rhythm.  She does have poor R wave progression but that is been noted in the past.  No acute ST segment changes are noted.    Current Outpatient Prescriptions   Medication Sig Dispense Refill     acetaminophen (TYLENOL) 500 MG tablet Take 1,000 mg by mouth daily as needed        aspirin 325 MG tablet Take 325 mg by mouth daily        Bismuth Tribromoph-Petrolatum (XEROFORM PETROLATUM DRESSING) PADS 5x9\" dressing to forearm daily 30 each 1     Calcium-Magnesium-Zinc 333-133-5 MG TABS per tablet Take 1 tablet by mouth 2 times daily (with meals)        lisinopril-hydrochlorothiazide (PRINZIDE/ZESTORETIC) 10-12.5 MG per tablet Take 1 tablet by mouth daily        magnesium gluconate (MAGONATE) 250 MG tablet Take by mouth daily        mineral oil-hydrophilic petrolatum (AQUAPHOR)        nicotine (NICODERM CQ) 14 MG/24HR 24 hr patch PLACE 1 PATCH ONTO THE SKIN EVERY 24 HOURS. DO NOT CUT PATCH; ROTATE SITES.       nicotine (NICODERM CQ) 21 MG/24HR 24 hr patch   5     order for DME Equipment being ordered: Wound care supplies, 1 each daily x 21 days  Xeroform occlusive gauze 5\" x 9\"  Telfa non-adherent pad 8\" x 3\"  Kerlix bandage roll 4-1/2\" x 4-1/8 yd  ACE wrap, 4 inch (5 total)    Diagnosis: clear cell adenocarcinoma 21 days 1     simvastatin (ZOCOR) 40 MG tablet Take 40 mg by mouth At Bedtime          Past Medical History:   Diagnosis Date     Allergic rhinitis     Seasonal     Cancer (H) April     Hypertension 10 yrs ago       Past Surgical History:   Procedure Laterality Date     EXPLORE NECK Right 9/20/2018    Procedure: EXPLORE NECK;;  Surgeon: Leslie Garza MD;  Location: [facility] OR     GRAFT FREE VASCULARIZED (LOCATION) Right 9/20/2018    Procedure: GRAFT FREE VASCULARIZED (LOCATION);;  Surgeon: Terrence Gutierrez MD;  Location: [facility] OR     GRAFT SKIN SPLIT THICKNESS FROM EXTREMITY N/A " "9/20/2018    Procedure: GRAFT SKIN SPLIT THICKNESS FROM EXTREMITY;;  Surgeon: Terrence Gutierrez MD;  Location: [facility] OR     HYSTERECTOMY       ORTHOPEDIC SURGERY  2/4/2017    Repaired broken ankle     OSTEOTOMY MAXILLA Right 9/20/2018    Procedure: OSTEOTOMY MAXILLA;  Right Maxillectomy, Right Neck Exploration, Left Forearm Free Flap, Split Thickness Skin Graft from Lower Extremity, Nasogastric Feeding Tube Placement;  Surgeon: Leslie Garza MD;  Location: [facility] OR       Family History   Problem Relation Age of Onset     Prostate Cancer Father      Melanoma Brother      Hypertension Mother      Pretty much the whole family       Social History   Substance Use Topics     Smoking status: Former Smoker     Packs/day: 2.50     Years: 30.00     Types: Cigarettes     Quit date: 9/10/2018     Smokeless tobacco: Never Used      Comment: currently down to 5 cigs a day     Alcohol use No       Allergies   Allergen Reactions     Penicillins      Sulfa Drugs          ROS: ten system review except as noted in HPI. Nov 20, 2018/woa    Physical Examination:  Vitals: /79 (BP Location: Right arm, Patient Position: Chair, Cuff Size: Adult Large)  Pulse 80  Resp 16  Ht 1.651 m (5' 5\")  Wt 93 kg (205 lb)  SpO2 98%  BMI 34.11 kg/m2  BMI= Body mass index is 34.11 kg/(m^2).    GENERAL APPEARANCE: healthy, alert and no distress  HEENT: no icterus, no xanthelasmas, normal pupil size and reaction, normal palate, mucosa moist, no cyanosis.  NECK: neck incision healing well JVP is not visible  CHEST: lungs clear to auscultation  CARDIOVASCULAR: regular rhythm, normal S1S2, no murmur or gallop, precordium quiet  EXTREMITIES: left forearm covered by dressing and ace wrap, fingers warm  NEURO: alert and oriented to person/place/time, normal speech, gait and affect    Laboratory reviewed 20Nov2018:      Results for LUCA HOLDER (MRN [id number]) as of 11/23/2018 20:46   Ref. Range 11/19/2018 10:52   Troponin I ES Latest Ref Range: " 0.000 - 0.045 ug/L <0.015       Assessment and recommendations:    1) Paroxysmal atrial fibrillation -  No palpitations but episodes of dyspnea on exertion     - ziopatch ordered    2) Exertional chest pain    - nuclear stress study ordered     If she develops pain at rest, worsening pain, or pain that does not resolve in less than 5-10 minutes she should call 911.    I appreciate the chance to help with Mrs. Burden's care.     Portions of this note were dictated using speech recognition software. The note has been proofread but errors in the text may have been overlooked. Please contact me if there are any concerns regarding the accuracy of the dictation.         Lucas Diggs MD

## 2018-11-20 NOTE — PATIENT INSTRUCTIONS
You were seen by Dr. Diggs, 11.20.18.     1. A Zio Patch and a Lexiscan were ordered this date.      2. Diagnostic imaging will call you to schedule these tests.    3. Once we receive the results, we will call you.    You will follow up with Dr. Diggs in 5 months.       Please call the cardiology office with problems, questions, or concerns at [phone].    If you experience chest pain, chest pressure, chest tightness, shortness of breath, fainting, lightheadedness, nausea, vomiting, or other concerning symptoms, please report to the Emergency Department or call 911. These symptoms may be emergent, and best treated in the Emergency Department.         Cardiology Nurse  [facility]  [phone]

## 2018-11-20 NOTE — MR AVS SNAPSHOT
After Visit Summary   11/20/2018    Ms. Martita Burden    MRN: [id number]           Patient Information     Date Of Birth          1955        Visit Information        Provider Department      11/20/2018 3:00 PM Lucas Diggs MD [facility]        Today's Diagnoses     Chronic atrial fibrillation (H)    -  1    Palpitations        Paroxysmal atrial fibrillation (H)        Other chest pain          Care Instructions    You were seen by Dr. Diggs, 11.20.18.     1. A Zio Patch and a Lexiscan were ordered this date.      2. Diagnostic imaging will call you to schedule these tests.    3. Once we receive the results, we will call you.    You will follow up with Dr. Diggs in 5 months.       Please call the cardiology office with problems, questions, or concerns at [phone].    If you experience chest pain, chest pressure, chest tightness, shortness of breath, fainting, lightheadedness, nausea, vomiting, or other concerning symptoms, please report to the Emergency Department or call 911. These symptoms may be emergent, and best treated in the Emergency Department.         Cardiology Nurse  [facility]  [phone]            Follow-ups after your visit        Your next 10 appointments already scheduled     Nov 21, 2018 11:30 AM CST   Treatment with [facility]   [facility] ([facility] )    [address] [address]   [phone]            Nov 21, 2018 12:00 PM CST   on treatment visit with Dorian Weaver MD   [facility] ([facility] )    [address] [address]   [phone]            Nov 26, 2018 11:30 AM CST   Treatment with [facility]   [facility] ([facility] )    [address] [address]   [phone]            Nov 26, 2018 11:45 AM CST   Treatment with [facility]   [facility] ([facility] )     [address] [address]   [phone]            Nov 27, 2018 11:30 AM CST   Treatment [facility]    [address] [address]   [phone]            Nov 28, 2018 11:30 AM CST   Treatment with [facility]    [address] [address]   [phone]            Nov 29, 2018 11:30 AM CST   Treatment with [facility]    [address] [address]   [phone]            Nov 30, 2018 11:30 AM CST   Treatment with [facility]    [address] [address]   [phone]            Dec 03, 2018 11:30 AM CST   Treatment with [facility]    [address] [address]   [phone]            Dec 04, 2018 11:30 AM CST   Treatment with [facility]    [address] [address]   [phone]              Future tests that were ordered for you today     Open Future Orders        Priority Expected Expires Ordered    Zio Patch Holter Routine  1/4/2019 11/20/2018    NM Lexiscan stress test Routine  11/20/2019 11/20/2018            Who to contact     If you have questions or need follow up information about today's clinic visit or your schedule please contact [facility] directly at [phone].  Normal or non-critical lab and imaging results will be communicated to you by MyChart, letter or phone within 4 business days after the clinic has received the results. If you do not hear from us within 7 days, please contact the clinic through MyChart or phone. If you have a critical or abnormal lab result, we will notify you by phone as soon as possible.  Submit refill requests  "through viVood or call your pharmacy and they will forward the refill request to us. Please allow 3 business days for your refill to be completed.          Additional Information About Your Visit        MyChart Information     viVood gives you secure access to your electronic health record. If you see a primary care provider, you can also send messages to your care team and make appointments. If you have questions, please call your primary care clinic.  If you do not have a primary care provider, please call [phone] and they will assist you.        Care EveryWhere ID     This is your Care EveryWhere ID. This could be used by other organizations to access your [facility] medical records  [id number]        Your Vitals Were     Pulse Respirations Height Pulse Oximetry BMI (Body Mass Index)       80 16 1.651 m (5' 5\") 98% 34.11 kg/m2        Blood Pressure from Last 3 Encounters:   11/20/18 123/79   10/29/18 122/76   10/23/18 126/80    Weight from Last 3 Encounters:   11/20/18 93 kg (205 lb)   10/29/18 93.4 kg (206 lb)   10/23/18 91.6 kg (202 lb)               Primary Care Provider Office Phone # Fax #    Jolly Adrianna [phone]       [facility] [address] [facility] [address]        Equal Access to Services     [facility] MURALI : Hadii aad ku hadasho Soomaali, waaxda luqadaha, qaybta kaalmada adeegyada, waxay rafaelin hayrosa jiménez . So [facility] [phone].    ATENCIÓN: Si habla español, tiene a robison disposición servicios gratuitos de asistencia lingüística. Llame al [phone].    We comply with applicable federal civil rights laws and [place] laws. We do not discriminate on the basis of race, color, national origin, age, disability, sex, sexual orientation, or gender identity.            Thank you!     Thank you for choosing [facility]  for your care. Our goal is always to provide you with excellent care. Hearing back from our patients is one way we can " "continue to improve our services. Please take a few minutes to complete the written survey that you may receive in the mail after your visit with us. Thank you!             Your Updated Medication List - Protect others around you: Learn how to safely use, store and throw away your medicines at www.disposemymeds.org.          This list is accurate as of 11/20/18  3:05 PM.  Always use your most recent med list.                   Brand Name Dispense Instructions for use Diagnosis    acetaminophen 500 MG tablet    TYLENOL     Take 1,000 mg by mouth daily as needed        aspirin 325 MG tablet      Take 325 mg by mouth daily        Calcium-Magnesium-Zinc 333-133-5 MG Tabs per tablet      Take 1 tablet by mouth 2 times daily (with meals)        lisinopril-hydrochlorothiazide 10-12.5 MG per tablet    PRINZIDE/ZESTORETIC     Take 1 tablet by mouth daily        magnesium gluconate 250 MG tablet    MAGONATE     Take by mouth daily        mineral oil-hydrophilic petrolatum           * nicotine 14 MG/24HR 24 hr patch    NICODERM CQ     PLACE 1 PATCH ONTO THE SKIN EVERY 24 HOURS. DO NOT CUT PATCH; ROTATE SITES.        * nicotine 21 MG/24HR 24 hr patch    NICODERM CQ          order for DME     21 days    Equipment being ordered: Wound care supplies, 1 each daily x 21 days Xeroform occlusive gauze 5\" x 9\" Telfa non-adherent pad 8\" x 3\" Kerlix bandage roll 4-1/2\" x 4-1/8 yd ACE wrap, 4 inch (5 total)  Diagnosis: clear cell adenocarcinoma    Clear cell carcinoma (H)       simvastatin 40 MG tablet    ZOCOR     Take 40 mg by mouth At Bedtime        XEROFORM PETROLATUM DRESSING Pads     30 each    5x9\" dressing to forearm daily    Encounter for change or removal of surgical wound dressing       * Notice:  This list has 2 medication(s) that are the same as other medications prescribed for you. Read the directions carefully, and ask your doctor or other care provider to review them with you.      "

## 2018-11-20 NOTE — PROGRESS NOTES
"      Clinical Cardiac Electrophysiology    Chief Complaint: post-op atrial fibrillation     HPI: Patient was initially seen as an inpatient at [facility]. At that time:    \"Assessment & Plan    Martita Burden is a 61 yo female s/p right maxillectomy, right neck exploration, right forearm flap, split thickness skin graft on 9/20/2018.  She had paroxysmal A. fib episode but converted into normal sinus rhythm.  No signs of ACS or heart failure.     #Paroxysmal atrial fibrillation  [id number] Score is 2(gender, HTN) but it is very likely to be provoked by the recent ENT surgery. The patient is asymptomatic. Currently she is in sinus rhythm.  We would not start anticoagulation at this moment.  But if she frequently has A. fib episodes we would consider anticoagulation and HR / rhythm control strategies.      Recommendations  - ECHO, TSH(both ordered for you)  - if afib occurs, can give metoprolol for HR control  - we may consider EKG monitor at discharge\"    Today, October 23, 2018, she reports that she has been doing well since discharge.  She has had no palpitations.  She reports a small area on her left forearm that is still healing from the skin graft.     November 20, 2018 Interval history: She reports no palpitations but she has noticed some increase shortness of breath with activities.  She is not sure if her heart is racing during these episodes or not.  This is associated with the mild sensation of chest discomfort or perhaps pressure.  The discomfort does not last very long.  It is relieved with a few minutes of rest.  She reports no previous history of discomfort such as this.  She is just started her radiation therapy this week.  She will be staying here inhibiting for her daily treatments, Monday through Friday, for the next 5 weeks.    She had a troponin checked yesterday which was normal at less than 0.015.  I also asked her to obtain an ECG yesterday.  That was done and I " "have personally reviewed that tracing.  It shows sinus rhythm.  She does have poor R wave progression but that is been noted in the past.  No acute ST segment changes are noted.    Current Outpatient Prescriptions   Medication Sig Dispense Refill     acetaminophen (TYLENOL) 500 MG tablet Take 1,000 mg by mouth daily as needed        aspirin 325 MG tablet Take 325 mg by mouth daily        Bismuth Tribromoph-Petrolatum (XEROFORM PETROLATUM DRESSING) PADS 5x9\" dressing to forearm daily 30 each 1     Calcium-Magnesium-Zinc 333-133-5 MG TABS per tablet Take 1 tablet by mouth 2 times daily (with meals)        lisinopril-hydrochlorothiazide (PRINZIDE/ZESTORETIC) 10-12.5 MG per tablet Take 1 tablet by mouth daily        magnesium gluconate (MAGONATE) 250 MG tablet Take by mouth daily        mineral oil-hydrophilic petrolatum (AQUAPHOR)        nicotine (NICODERM CQ) 14 MG/24HR 24 hr patch PLACE 1 PATCH ONTO THE SKIN EVERY 24 HOURS. DO NOT CUT PATCH; ROTATE SITES.       nicotine (NICODERM CQ) 21 MG/24HR 24 hr patch   5     order for DME Equipment being ordered: Wound care supplies, 1 each daily x 21 days  Xeroform occlusive gauze 5\" x 9\"  Telfa non-adherent pad 8\" x 3\"  Kerlix bandage roll 4-1/2\" x 4-1/8 yd  ACE wrap, 4 inch (5 total)    Diagnosis: clear cell adenocarcinoma 21 days 1     simvastatin (ZOCOR) 40 MG tablet Take 40 mg by mouth At Bedtime          Past Medical History:   Diagnosis Date     Allergic rhinitis     Seasonal     Cancer (H) April     Hypertension 10 yrs ago       Past Surgical History:   Procedure Laterality Date     EXPLORE NECK Right 9/20/2018    Procedure: EXPLORE NECK;;  Surgeon: Leslie Garza MD;  Location: [facility] OR     GRAFT FREE VASCULARIZED (LOCATION) Right 9/20/2018    Procedure: GRAFT FREE VASCULARIZED (LOCATION);;  Surgeon: Terrence Gutierrez MD;  Location: [facility] OR     GRAFT SKIN SPLIT THICKNESS FROM EXTREMITY N/A 9/20/2018    Procedure: GRAFT SKIN SPLIT THICKNESS FROM EXTREMITY;;  Surgeon: " "Terrence Gutierrez MD;  Location: [facility] OR     HYSTERECTOMY       ORTHOPEDIC SURGERY  2/4/2017    Repaired broken ankle     OSTEOTOMY MAXILLA Right 9/20/2018    Procedure: OSTEOTOMY MAXILLA;  Right Maxillectomy, Right Neck Exploration, Left Forearm Free Flap, Split Thickness Skin Graft from Lower Extremity, Nasogastric Feeding Tube Placement;  Surgeon: Leslie Garza MD;  Location: [facility] OR       Family History   Problem Relation Age of Onset     Prostate Cancer Father      Melanoma Brother      Hypertension Mother      Pretty much the whole family       Social History   Substance Use Topics     Smoking status: Former Smoker     Packs/day: 2.50     Years: 30.00     Types: Cigarettes     Quit date: 9/10/2018     Smokeless tobacco: Never Used      Comment: currently down to 5 cigs a day     Alcohol use No       Allergies   Allergen Reactions     Penicillins      Sulfa Drugs          ROS: ten system review except as noted in HPI. Nov 20, 2018/woa    Physical Examination:  Vitals: /79 (BP Location: Right arm, Patient Position: Chair, Cuff Size: Adult Large)  Pulse 80  Resp 16  Ht 1.651 m (5' 5\")  Wt 93 kg (205 lb)  SpO2 98%  BMI 34.11 kg/m2  BMI= Body mass index is 34.11 kg/(m^2).    GENERAL APPEARANCE: healthy, alert and no distress  HEENT: no icterus, no xanthelasmas, normal pupil size and reaction, normal palate, mucosa moist, no cyanosis.  NECK: neck incision healing well JVP is not visible  CHEST: lungs clear to auscultation  CARDIOVASCULAR: regular rhythm, normal S1S2, no murmur or gallop, precordium quiet  EXTREMITIES: left forearm covered by dressing and ace wrap, fingers warm  NEURO: alert and oriented to person/place/time, normal speech, gait and affect    Laboratory reviewed 20Nov2018:      Results for LUCA HOLDER (MRN [id number]) as of 11/23/2018 20:46   Ref. Range 11/19/2018 10:52   Troponin I ES Latest Ref Range: 0.000 - 0.045 ug/L <0.015       Assessment and recommendations:    1) " Paroxysmal atrial fibrillation -  No palpitations but episodes of dyspnea on exertion     - ziopatch ordered    2) Exertional chest pain    - nuclear stress study ordered     If she develops pain at rest, worsening pain, or pain that does not resolve in less than 5-10 minutes she should call 911.    I appreciate the chance to help with Mrs. Burden's care.     Portions of this note were dictated using speech recognition software. The note has been proofread but errors in the text may have been overlooked. Please contact me if there are any concerns regarding the accuracy of the dictation.

## 2018-11-20 NOTE — NURSING NOTE
"Chief Complaint   Patient presents with     RECHECK     1 month cardiology consult;  Patient states that she feels like she \"has a pressure on the left side of her chest that come an goes\".  She states \"if she tries to lay on her left side while she's having the pressure, it gets worse\".        Initial /79 (BP Location: Right arm, Patient Position: Chair, Cuff Size: Adult Large)  Pulse 80  Resp 16  Ht 1.651 m (5' 5\")  Wt 93 kg (205 lb)  SpO2 98%  BMI 34.11 kg/m2 Estimated body mass index is 34.11 kg/(m^2) as calculated from the following:    Height as of this encounter: 1.651 m (5' 5\").    Weight as of this encounter: 93 kg (205 lb).  Medication Reconciliation: complete    Steffany Wallace LPN    "

## 2018-11-20 NOTE — MR AVS SNAPSHOT
After Visit Summary   11/20/2018    Ms. Martita Burden    MRN: [id number]           Patient Information     Date Of Birth          1955        Visit Information        Provider Department      11/20/2018 11:30 AM [facility]        Today's Diagnoses     Clear cell carcinoma (H)    -  1       Follow-ups after your visit        Your next 10 appointments already scheduled     Nov 20, 2018 11:30 AM CST   Treatment with [facility]   [facility] ([facility] )    [address] [address]   [phone]            Nov 20, 2018  3:00 PM CST   (Arrive by 2:45 PM)   [facility]   [facility]    [address] [facility] [address]   [phone]            Nov 21, 2018 11:30 AM CST   Treatment with [facility]   [facility] ([facility] )    [address] [address]   [phone]            Nov 21, 2018 12:00 PM CST   on treatment visit with Dorian Weaver MD   [facility] ([facility] )    [address] [address]   [phone]            Nov 26, 2018 11:30 AM CST   Treatment with [facility]   [facility] ([facility] )    [address] [address]   [phone]            Nov 26, 2018 11:45 AM CST   Treatment with [facility]   [facility] ([facility] )    [address] [address]   [phone]            Nov 27, 2018 11:30 AM CST   Treatment with [facility]   [facility] ([facility] )    [address] [address]   [phone]            Nov 28, 2018 11:30 AM CST   Treatment with [facility]   [facility] ([facility] )    [address] [address]   [phone]            Nov 29, 2018 11:30 AM CST    Treatment with [facility]   [facility] ([facility] )    [address] [address] [phone]            Nov 30, 2018 11:30 AM CST   Treatment with [facility]   [facility] ([facility] )    [address] [address] [phone]              Who to contact     If you have questions or need follow up information about today's clinic visit or your schedule please contact [facility] directly at [phone].  Normal or non-critical lab and imaging results will be communicated to you by [employer], letter or phone within 4 business days after the clinic has received the results. If you do not hear from us within 7 days, please contact the clinic through [email or web address] or phone. If you have a critical or abnormal lab result, we will notify you by phone as soon as possible.  Submit refill requests through [email or web address] or call your pharmacy and they will forward the refill request to us. Please allow 3 business days for your refill to be completed.          Additional Information About Your Visit        [email or web address] Information     [email or web address] gives you secure access to your electronic health record. If you see a primary care provider, you can also send messages to your care team and make appointments. If you have questions, please call your primary care clinic.  If you do not have a primary care provider, please call [phone] and they will assist you.        Care EveryWhere ID     This is your Care EveryWhere ID. This could be used by other organizations to access your [place] medical records  [id number]         Blood Pressure from Last 3 Encounters:   10/29/18 122/76   10/23/18 126/80   09/26/18 121/68    Weight from Last 3 Encounters:   10/29/18 93.4 kg (206 lb)   10/23/18 91.6 kg (202 lb)   10/05/18 91.2 kg (201 lb)              Today, you had the following     No orders found for display       Primary Care Provider Office Phone # Fay  #    [facility] [phone]       [facility] [address] [facility] [address]        Equal Access to Services     JJ CARRION : Hadii aad ku hadraghav Sosean, waderekda luqadaha, qaybta kaalmada timur, karolina rafaelin hayaajorge smallcharlotte barragan jessy urena. So [facility] [phone].    ATENCIÓN: Si habla español, tiene a robison disposición servicios gratuitos de asistencia lingüística. Llame al [phone].    We comply with applicable federal civil rights laws and [place] laws. We do not discriminate on the basis of race, color, national origin, age, disability, sex, sexual orientation, or gender identity.            Thank you!     Thank you for choosing [facility]  for your care. Our goal is always to provide you with excellent care. Hearing back from our patients is one way we can continue to improve our services. Please take a few minutes to complete the written survey that you may receive in the mail after your visit with us. Thank you!             Your Updated Medication List - Protect others around you: Learn how to safely use, store and throw away your medicines at www.disposemymeds.org.          This list is accurate as of 11/20/18 11:22 AM.  Always use your most recent med list.                   Brand Name Dispense Instructions for use Diagnosis    acetaminophen 500 MG tablet    TYLENOL     1,000 mg by Nasogastric route        aspirin 325 MG tablet      325 mg by Nasogastric route        Calcium-Magnesium-Zinc 333-133-5 MG Tabs per tablet      Take 1 tablet by mouth 2 times daily (with meals)        lisinopril-hydrochlorothiazide 10-12.5 MG per tablet    PRINZIDE/ZESTORETIC     Take 1 tablet by mouth        magnesium gluconate 250 MG tablet    MAGONATE          mineral oil-hydrophilic petrolatum           * nicotine 14 MG/24HR 24 hr patch    NICODERM CQ     PLACE 1 PATCH ONTO THE SKIN EVERY 24 HOURS. DO NOT CUT PATCH; ROTATE SITES.        * nicotine 21 MG/24HR 24 hr patch    NICODERM  "CQ          order for DME     21 days    Equipment being ordered: Wound care supplies, 1 each daily x 21 days Xeroform occlusive gauze 5\" x 9\" Telfa non-adherent pad 8\" x 3\" Kerlix bandage roll 4-1/2\" x 4-1/8 yd ACE wrap, 4 inch (5 total)  Diagnosis: clear cell adenocarcinoma    Clear cell carcinoma (H)       simvastatin 40 MG tablet    ZOCOR     Take 40 mg by mouth        XEROFORM PETROLATUM DRESSING Pads     30 each    5x9\" dressing to forearm daily    Encounter for change or removal of surgical wound dressing       * Notice:  This list has 2 medication(s) that are the same as other medications prescribed for you. Read the directions carefully, and ask your doctor or other care provider to review them with you.      "

## 2018-11-20 NOTE — PROCEDURES
Martita Burden received radiation therapy treatment today 11/20/18.    Leida Walter  November 20, 2018  11:10 AM

## 2018-11-21 ENCOUNTER — OFFICE VISIT (OUTPATIENT)
Dept: RADIATION ONCOLOGY | Facility: HOSPITAL | Age: 63
End: 2018-11-21
Attending: RADIOLOGY
Payer: COMMERCIAL

## 2018-11-21 ENCOUNTER — ALLIED HEALTH/NURSE VISIT (OUTPATIENT)
Dept: RADIATION ONCOLOGY | Facility: HOSPITAL | Age: 63
End: 2018-11-21

## 2018-11-21 VITALS
SYSTOLIC BLOOD PRESSURE: 122 MMHG | HEART RATE: 80 BPM | BODY MASS INDEX: 34.28 KG/M2 | RESPIRATION RATE: 16 BRPM | WEIGHT: 206 LBS | DIASTOLIC BLOOD PRESSURE: 70 MMHG

## 2018-11-21 DIAGNOSIS — C80.1 CLEAR CELL CARCINOMA (H): Primary | ICD-10-CM

## 2018-11-21 PROCEDURE — 77412 RADIATION TX DELIVERY LVL 3: CPT | Performed by: RADIOLOGY

## 2018-11-21 PROCEDURE — 77387 GUIDANCE FOR RADJ TX DLVR: CPT | Performed by: RADIOLOGY

## 2018-11-21 ASSESSMENT — PAIN SCALES - GENERAL: PAINLEVEL: NO PAIN (0)

## 2018-11-21 NOTE — MR AVS SNAPSHOT
After Visit Summary   11/21/2018    Ms. Martita Burden    MRN: [id number]           Patient Information     Date Of Birth          1955        Visit Information        Provider Department      11/21/2018 11:30 AM [facility]        Today's Diagnoses     Clear cell carcinoma (H)    -  1       Follow-ups after your visit        Your next 10 appointments already scheduled     Nov 21, 2018 11:30 AM CST   Treatment with [facility]   [facility] ([facility] )    [address] [address]   [phone]            Nov 21, 2018 12:00 PM CST   on treatment visit with Dorian Weaver MD   [facility] ([facility] )    [address] [address]   [phone]            Nov 26, 2018 11:30 AM CST   Treatment with [facility]   [facility] ([facility] )    [address] [address]   [phone]            Nov 26, 2018 11:45 AM CST   Treatment with [facility]   [facility] ([facility] )    [address] [address]   [phone]            Nov 27, 2018 11:30 AM CST   Treatment with [facility]   [facility] ([facility] )    [address] [address]   [phone]            Nov 28, 2018 11:30 AM CST   Treatment with [facility]   [facility] ([facility] )    [address] [address]   [phone]            Nov 29, 2018 11:30 AM CST   Treatment with [facility]   [facility] ([facility] )    [address] [address]   [phone]            Nov 30, 2018 11:30 AM CST   Treatment with [facility]   [facility] ([facility] )    [address] [address]   [phone]            Dec 03, 2018 11:30 AM CST   Treatment with [facility]   [facility]  ([facility] )    [address] [id number] [phone]            Dec 04, 2018 11:30 AM CST   Treatment with [facility]   [facility] ([facility] )    [address] [id number] [phone]              Future tests that were ordered for you today     Open Future Orders        Priority Expected Expires Ordered    Zio Patch Holter Routine  1/4/2019 11/20/2018    NM Lexiscan stress test Routine  11/20/2019 11/20/2018            Who to contact     If you have questions or need follow up information about today's clinic visit or your schedule please contact [facility] directly at [phone].  Normal or non-critical lab and imaging results will be communicated to you by [employer], letter or phone within 4 business days after the clinic has received the results. If you do not hear from us within 7 days, please contact the clinic through [employer] or phone. If you have a critical or abnormal lab result, we will notify you by phone as soon as possible.  Submit refill requests through [employer] or call your pharmacy and they will forward the refill request to us. Please allow 3 business days for your refill to be completed.          Additional Information About Your Visit        [employer] Information     [employer] gives you secure access to your electronic health record. If you see a primary care provider, you can also send messages to your care team and make appointments. If you have questions, please call your primary care clinic.  If you do not have a primary care provider, please call [phone] and they will assist you.        Care EveryWhere ID     This is your Care EveryWhere ID. This could be used by other organizations to access your [place] medical records  [id number]         Blood Pressure from Last 3 Encounters:   11/20/18 123/79   10/29/18 122/76   10/23/18 126/80    Weight from Last 3 Encounters:   11/20/18 93 kg (205 lb)    10/29/18 93.4 kg (206 lb)   10/23/18 91.6 kg (202 lb)              Today, you had the following     No orders found for display       Primary Care Provider Office Phone # Fax #    Jolly Rodas [phone]       [facility] [address] [facility] [address]        Equal Access to Services     [facility] MURALI : Hadii aad ku hadasho Soomaali, waaxda luqadaha, qaybta kaalmada adeegyada, waxay idiin hayaan adecharlotte carlsonjoecarlota lalucy . So [facility] [phone].    ATENCIÓN: Si habla rosana, tiene a robison disposición servicios gratuitos de asistencia lingüística. Daniellaame al [phone].    We comply with applicable federal civil rights laws and [place] laws. We do not discriminate on the basis of race, color, national origin, age, disability, sex, sexual orientation, or gender identity.            Thank you!     Thank you for choosing [facility]  for your care. Our goal is always to provide you with excellent care. Hearing back from our patients is one way we can continue to improve our services. Please take a few minutes to complete the written survey that you may receive in the mail after your visit with us. Thank you!             Your Updated Medication List - Protect others around you: Learn how to safely use, store and throw away your medicines at www.disposemymeds.org.          This list is accurate as of 11/21/18 11:17 AM.  Always use your most recent med list.                   Brand Name Dispense Instructions for use Diagnosis    acetaminophen 500 MG tablet    TYLENOL     Take 1,000 mg by mouth daily as needed        aspirin 325 MG tablet      Take 325 mg by mouth daily        Calcium-Magnesium-Zinc 333-133-5 MG Tabs per tablet      Take 1 tablet by mouth 2 times daily (with meals)        lisinopril-hydrochlorothiazide 10-12.5 MG per tablet    PRINZIDE/ZESTORETIC     Take 1 tablet by mouth daily        magnesium gluconate 250 MG tablet    MAGONATE     Take by mouth daily        mineral  "oil-hydrophilic petrolatum           * nicotine 14 MG/24HR 24 hr patch    NICODERM CQ     PLACE 1 PATCH ONTO THE SKIN EVERY 24 HOURS. DO NOT CUT PATCH; ROTATE SITES.        * nicotine 21 MG/24HR 24 hr patch    NICODERM CQ          order for DME     21 days    Equipment being ordered: Wound care supplies, 1 each daily x 21 days Xeroform occlusive gauze 5\" x 9\" Telfa non-adherent pad 8\" x 3\" Kerlix bandage roll 4-1/2\" x 4-1/8 yd ACE wrap, 4 inch (5 total)  Diagnosis: clear cell adenocarcinoma    Clear cell carcinoma (H)       simvastatin 40 MG tablet    ZOCOR     Take 40 mg by mouth At Bedtime        XEROFORM PETROLATUM DRESSING Pads     30 each    5x9\" dressing to forearm daily    Encounter for change or removal of surgical wound dressing       * Notice:  This list has 2 medication(s) that are the same as other medications prescribed for you. Read the directions carefully, and ask your doctor or other care provider to review them with you.      "

## 2018-11-21 NOTE — MR AVS SNAPSHOT
After Visit Summary   11/21/2018    Ms. Martita Burden    MRN: [id number]           Patient Information     Date Of Birth          1955        Visit Information        Provider Department      11/21/2018 12:00 PM Dorian Weaver MD [facility]        Today's Diagnoses     Clear cell carcinoma (H)    -  1       Follow-ups after your visit        Your next 10 appointments already scheduled     Nov 26, 2018 11:30 AM CST   Treatment with [facility]   [facility] ([facility] )    [address] [address]   [phone]            Nov 26, 2018 11:45 AM CST   Treatment with [facility]   [facility] ([facility] )    [address] [address]   [phone]            Nov 27, 2018 11:30 AM CST   Treatment with [facility]   [facility] ([facility] )    [address] [address]   [phone]            Nov 28, 2018 11:30 AM CST   Treatment with [facility]   [facility] ([facility] )    [address] [address]   [phone]            Nov 29, 2018 11:30 AM CST   Treatment with [facility]   [facility] ([facility] )    [address] [address]   [phone]            Nov 30, 2018 11:30 AM CST   Treatment with [facility]   [facility] ([facility] )    [address] [address]   [phone]            Dec 03, 2018 11:30 AM CST   Treatment with [facility]   [facility] ([facility] )    [address] [address]   [phone]            Dec 04, 2018 11:30 AM CST   Treatment with [facility]   [facility] ([facility] )    [address] [address]   [phone]            Dec 05, 2018 11:30 AM CST   Treatment with [facility]   [facility] ([place]  [facility] )    [address] [address] [phone]            Dec 06, 2018 11:30 AM CST   Treatment with [facility]   [facility] ([facility] )    [address] [address] [phone]              Future tests that were ordered for you today     Open Future Orders        Priority Expected Expires Ordered    Zio Patch Holter Routine  1/4/2019 11/20/2018    NM Lexiscan stress test Routine  11/20/2019 11/20/2018            Who to contact     If you have questions or need follow up information about today's clinic visit or your schedule please contact [facility] directly at [phone].  Normal or non-critical lab and imaging results will be communicated to you by [facility] Form[facility], letter or phone within 4 business days after the clinic has received the results. If you do not hear from us within 7 days, please contact the clinic through Globialt or phone. If you have a critical or abnormal lab result, we will notify you by phone as soon as possible.  Submit refill requests through Zipdial or call your pharmacy and they will forward the refill request to us. Please allow 3 business days for your refill to be completed.          Additional Information About Your Visit        Zipdial Information     Zipdial gives you secure access to your electronic health record. If you see a primary care provider, you can also send messages to your care team and make appointments. If you have questions, please call your primary care clinic.  If you do not have a primary care provider, please call [phone] and they will assist you.        Care EveryWhere ID     This is your Care EveryWhere ID. This could be used by other organizations to access your [place] medical records  [id number]        Your Vitals Were     Pulse Respirations BMI (Body Mass Index)             80 16 34.28 kg/m2          Blood Pressure from Last 3 Encounters:   11/21/18 122/70    11/20/18 123/79   10/29/18 122/76    Weight from Last 3 Encounters:   11/21/18 93.4 kg (206 lb)   11/20/18 93 kg (205 lb)   10/29/18 93.4 kg (206 lb)              Today, you had the following     No orders found for display       Primary Care Provider Office Phone # Fax #    Jolly Adrianna [phone]       [facility] [address] [facility] [address]        Equal Access to Services     JJ CARRION : Hadii aad ku hadasho Soomaali, waaxda luqadaha, qaybta kaalmada adeegyada, karolina solorzano hayrosa jiménez . So [facility] [phone].    ATENCIÓN: Si habla español, tiene a robison disposición servicios gratuitos de asistencia lingüística. [facility] [phone].    We comply with applicable federal civil rights laws and [place] laws. We do not discriminate on the basis of race, color, national origin, age, disability, sex, sexual orientation, or gender identity.            Thank you!     Thank you for choosing [facility]  for your care. Our goal is always to provide you with excellent care. Hearing back from our patients is one way we can continue to improve our services. Please take a few minutes to complete the written survey that you may receive in the mail after your visit with us. Thank you!             Your Updated Medication List - Protect others around you: Learn how to safely use, store and throw away your medicines at www.disposemymeds.org.          This list is accurate as of 11/21/18  1:57 PM.  Always use your most recent med list.                   Brand Name Dispense Instructions for use Diagnosis    acetaminophen 500 MG tablet    TYLENOL     Take 1,000 mg by mouth daily as needed        aspirin 325 MG tablet    ASA     Take 325 mg by mouth daily        Calcium-Magnesium-Zinc 333-133-5 MG Tabs per tablet      Take 1 tablet by mouth 2 times daily (with meals)        lisinopril-hydrochlorothiazide 10-12.5 MG per tablet    PRINZIDE/ZESTORETIC     Take 1 tablet by mouth  "daily        magnesium gluconate 250 MG tablet    MAGONATE     Take by mouth daily        mineral oil-hydrophilic petrolatum           * nicotine 14 MG/24HR 24 hr patch    NICODERM CQ     PLACE 1 PATCH ONTO THE SKIN EVERY 24 HOURS. DO NOT CUT PATCH; ROTATE SITES.        * nicotine 21 MG/24HR 24 hr patch    NICODERM CQ          order for DME     21 days    Equipment being ordered: Wound care supplies, 1 each daily x 21 days Xeroform occlusive gauze 5\" x 9\" Telfa non-adherent pad 8\" x 3\" Kerlix bandage roll 4-1/2\" x 4-1/8 yd ACE wrap, 4 inch (5 total)  Diagnosis: clear cell adenocarcinoma    Clear cell carcinoma (H)       simvastatin 40 MG tablet    ZOCOR     Take 40 mg by mouth At Bedtime        XEROFORM PETROLATUM DRESSING Pads     30 each    5x9\" dressing to forearm daily    Encounter for change or removal of surgical wound dressing       * Notice:  This list has 2 medication(s) that are the same as other medications prescribed for you. Read the directions carefully, and ask your doctor or other care provider to review them with you.      "

## 2018-11-21 NOTE — PROGRESS NOTES
Martita Burden received radiation therapy treatment today 11/21/18.    Chace Lawrence  November 21, 2018  11:03 AM

## 2018-11-21 NOTE — PROGRESS NOTES
Service Date: 2018      RADIATION THERAPY PROGRESS NOTE      REFERRING PHYSICIANS:  Leslie Garza, Jolly Rodas NP.      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      RADIATION THERAPY:  The patient has received 600 of a prescribed 6000 cGy to date as postoperative management of a locally advanced malignancy involving the roof of mouth with extension through the hard palate into the nasal cavity on the right.      SUBJECTIVE:  Doing fine with treatment, really notes no side-effects at all.  She does perceive the edema in the right side of her face to be decreasing.      OBJECTIVE:     CONSTITUTIONAL:  Weight 206 pounds.     HEENT:  Examination of the treated area reveals no treatment-related changes.  Oral mucosa and graft appear moist and intact.      IMPRESSION:  Routine tolerance to radiation therapy with postoperative adjuvant intent for the above diagnosis.  Continue treatment as planned.         MARTIN ROWE MD             D: 2018   T: 2018   MT: SANDRA      Name:     LUCA HOLDER   MRN:      [id number]        Account:      [id number]   :      1955           Service Date: 2018      Document: [id number]       cc: Jolly Garza MD

## 2018-11-26 ENCOUNTER — ALLIED HEALTH/NURSE VISIT (OUTPATIENT)
Dept: RADIATION ONCOLOGY | Facility: HOSPITAL | Age: 63
End: 2018-11-26

## 2018-11-26 DIAGNOSIS — C06.9 SQUAMOUS CELL CARCINOMA OF ORAL CAVITY (H): Primary | ICD-10-CM

## 2018-11-26 PROCEDURE — 77412 RADIATION TX DELIVERY LVL 3: CPT | Performed by: RADIOLOGY

## 2018-11-26 PROCEDURE — 77387 GUIDANCE FOR RADJ TX DLVR: CPT | Performed by: RADIOLOGY

## 2018-11-26 NOTE — MR AVS SNAPSHOT
After Visit Summary   11/26/2018    Ms. 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)    -  1       Follow-ups after your visit        Your next 10 appointments already scheduled     Nov 26, 2018 11:45 AM CST   Treatment with [facility]   [facility] ([facility] )    [address] [phone] [phone]            Nov 27, 2018  7:30 AM CST   (Arrive by 7:15 AM)   NM INJECTION with [id number]   [facility] ([facility] )    [address] [id number] [phone]            Nov 27, 2018  8:45 AM CST   (Arrive by 8:30 AM)   NM SCAN3 with HINM1   [facility] ([facility] )    [address] [id number] [phone]            Nov 27, 2018  9:30 AM CST   (Arrive by 9:15 AM)   Ekg Stress Nm Lexiscan 30m with HIXRRN, HI STRESS RM1   [facility] ([facility] )    [address] [id number] [phone]            Nov 27, 2018 10:45 AM CST   (Arrive by 10:30 AM)   NM MPI WITH LEXISCAN with HINM1   [facility] ([facility] )    [address] [id number] [phone]           How do I prepare for my exam? (Food and drink instructions) Day 1 & Day 2: Stop all caffeine 12 hours before the test. This includes coffee, tea, soda pop, chocolate and certain medicines (such as Anacin, Excedrin and NoDoz). Also avoid decaf coffee and tea, as these contain small amounts of caffeine. Stop eating 4 hours before the test. You may drink water.  How do I prepare for my exam? (Other instructions) You may need to stop some medicines before the test. Follow your doctor s orders. Day 1 & Day 2: *If you  take a beta blocker: Do not take your beta-blocker on the day before your test, unless specifically told to by your doctor. And do not take it on the day of your test. Bring it with you to take after the test.  *If you take Aggrenox or dipyridamole (Persantine, Permole), stop taking it 48 hours before your test. *If you take Viagra, Cialis or Levitra, stop taking it 48 hours before your test. *If you take theophylline or aminophylline, stop taking it 12 hours before your test.  For patients with diabetes: *If you take insulin, call your diabetes care team. Ask if you should take a 1/2 dose the morning of your test. *If you take diabetes medicine by mouth, don t take it on the morning of your test. Bring it with you to take after the test. (If you have questions, call your diabetes care team.)  *Do not take nitrates on the day of your test. Do not wear your Nitro-Patch. *No alcohol, smoking or other tobacco for 12 hours before the test.  What should I wear: Please wear a loose two-piece outfit. If you will have an exercise test, bring rubber-soled walking shoes.  How long does the exam take: *This test can take 1-2 days.* ONE day exam: Allow 3-4 hours for test. IF TWO day exam: Allow  minutes PER day for test.  What should I bring: Please bring a list of your medicines (including vitamins, minerals and over-the-counter drugs). Leave your valuables at home.  Do I need a :  No  is needed.  What do I need to tell my doctor? When you arrive, please tell us if you: * Have diabetes * Are breastfeeding * May be pregnant * Have a pacemaker of ICD (implantable defibrillator).  What should I do after the exam: No restrictions, You may resume normal activities.  What is this test: Your doctor has ordered a nuclear stress test to check how well blood is flowing through your heart. You will either exercise or take a medicine that mimics exercise; we will watch your heart.  Who should I call with questions: If  you have any questions, please call the Imaging Department where you will have your exam. Directions, parking instructions, and other information is available on our website, [email or web address].            Nov 27, 2018 11:00 AM CST   ZIOPATCH MONITOR with HI STRESS RM1   [facility] Electrocardiology ([facility] )    [address] [phone] [phone]            Nov 27, 2018 11:30 AM CST   Treatment with [facility]   [facility] ([facility] )    [address] [phone] [phone]            Nov 28, 2018 11:30 AM CST   Treatment with [facility]   [facility] ([facility] )    [address] [address] [phone]            Nov 28, 2018 11:45 AM CST   on treatment visit with Dorian Weaver MD   [facility] ([facility] )    [address] [address] [phone]              Who to contact     If you have questions or need follow up information about today's clinic visit or your schedule please contact [facility] directly at [phone].  Normal or non-critical lab and imaging results will be communicated to you by MyChart, letter or phone within 4 business days after the clinic has received the results. If you do not hear from us within 7 days, please contact the clinic through BrandBackerhart or phone. If you have a critical or abnormal lab result, we will notify you by phone as soon as possible.  Submit refill requests through Cued or call your pharmacy and they will forward the refill request to us. Please allow 3 business days for your refill to be completed.          Additional Information About Your Visit        BrandBackerhart Information     Cued gives you secure access to your electronic health record. If you see a primary care provider, you can also send messages to your care team and make appointments. If you have questions, please call your primary  care clinic.  If you do not have a primary care provider, please call [phone] and they will assist you.        Care EveryWhere ID     This is your Care EveryWhere ID. This could be used by other organizations to access your [place] medical records  [id number]         Blood Pressure from Last 3 Encounters:   11/21/18 122/70   11/20/18 123/79   10/29/18 122/76    Weight from Last 3 Encounters:   11/21/18 93.4 kg (206 lb)   11/20/18 93 kg (205 lb)   10/29/18 93.4 kg (206 lb)              Today, you had the following     No orders found for display       Primary Care Provider Office Phone # Fax #    Jolly Adrianna [phone]       [facility] [address] [facility] [address]        Equal Access to Services     JJ CARRION : Hadii aad ku hadasho Sosean, waaxda luqadaha, qaybta kaalmada adecharlotteyabola, karolina jiménez . So [facility] [phone].    ATENCIÓN: Si habla español, tiene a robison disposición servicios gratuitos de asistencia lingüística. [facility] [phone].    We comply with applicable federal civil rights laws and [place] laws. We do not discriminate on the basis of race, color, national origin, age, disability, sex, sexual orientation, or gender identity.            Thank you!     Thank you for choosing [facility]  for your care. Our goal is always to provide you with excellent care. Hearing back from our patients is one way we can continue to improve our services. Please take a few minutes to complete the written survey that you may receive in the mail after your visit with us. Thank you!             Your Updated Medication List - Protect others around you: Learn how to safely use, store and throw away your medicines at www.disposemymeds.org.          This list is accurate as of 11/26/18 11:37 AM.  Always use your most recent med list.                   Brand Name Dispense Instructions for use Diagnosis    acetaminophen 500 MG tablet    TYLENOL     " Take 1,000 mg by mouth daily as needed        aspirin 325 MG tablet    ASA     Take 325 mg by mouth daily        Calcium-Magnesium-Zinc 333-133-5 MG Tabs per tablet      Take 1 tablet by mouth 2 times daily (with meals)        lisinopril-hydrochlorothiazide 10-12.5 MG per tablet    PRINZIDE/ZESTORETIC     Take 1 tablet by mouth daily        magnesium gluconate 250 MG tablet    MAGONATE     Take by mouth daily        mineral oil-hydrophilic petrolatum           * nicotine 14 MG/24HR 24 hr patch    NICODERM CQ     PLACE 1 PATCH ONTO THE SKIN EVERY 24 HOURS. DO NOT CUT PATCH; ROTATE SITES.        * nicotine 21 MG/24HR 24 hr patch    NICODERM CQ          order for DME     21 days    Equipment being ordered: Wound care supplies, 1 each daily x 21 days Xeroform occlusive gauze 5\" x 9\" Telfa non-adherent pad 8\" x 3\" Kerlix bandage roll 4-1/2\" x 4-1/8 yd ACE wrap, 4 inch (5 total)  Diagnosis: clear cell adenocarcinoma    Clear cell carcinoma (H)       simvastatin 40 MG tablet    ZOCOR     Take 40 mg by mouth At Bedtime        XEROFORM PETROLATUM DRESSING Pads     30 each    5x9\" dressing to forearm daily    Encounter for change or removal of surgical wound dressing       * Notice:  This list has 2 medication(s) that are the same as other medications prescribed for you. Read the directions carefully, and ask your doctor or other care provider to review them with you.      "

## 2018-11-26 NOTE — PROGRESS NOTES
Martita Burden received radiation therapy treatment today 11/26/18.    Adria Montemayor  November 26, 2018  11:22 AM

## 2018-11-27 ENCOUNTER — ALLIED HEALTH/NURSE VISIT (OUTPATIENT)
Dept: RADIATION ONCOLOGY | Facility: HOSPITAL | Age: 63
End: 2018-11-27

## 2018-11-27 DIAGNOSIS — C06.9 SQUAMOUS CELL CARCINOMA OF ORAL CAVITY (H): Primary | ICD-10-CM

## 2018-11-27 PROCEDURE — 77336 RADIATION PHYSICS CONSULT: CPT | Performed by: RADIOLOGY

## 2018-11-27 PROCEDURE — 77387 GUIDANCE FOR RADJ TX DLVR: CPT | Performed by: RADIOLOGY

## 2018-11-27 PROCEDURE — 77412 RADIATION TX DELIVERY LVL 3: CPT | Performed by: RADIOLOGY

## 2018-11-27 NOTE — PROGRESS NOTES
Martita Burden received radiation therapy treatment today 11/27/18.    Adria Montemayor  November 27, 2018  11:24 AM

## 2018-11-27 NOTE — MR AVS SNAPSHOT
After Visit Summary   11/27/2018    Ms. 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)    -  1       Follow-ups after your visit        Your next 10 appointments already scheduled     Nov 28, 2018 11:30 AM CST   Treatment with [facility] CLIN[facility] IX   [facility] Radiation Oncology ([facility] )    [address] [address] [phone]            Nov 28, 2018 11:45 AM CST   on treatment visit with Dorian Weaver MD   [facility] Radiation Oncology ([facility] )    [address] [place] [address] [phone]            Nov 29, 2018  7:30 AM CST   (Arrive by 7:15 AM)   NM INJECTION with SYAP0VCR   HI NUCLEAR MEDICINE ([facility] )    [address] [id number] [phone]            Nov 29, 2018  8:45 AM CST   (Arrive by 8:30 AM)   NM SCAN3 with HINM1   [facility] NUCLEAR MEDICINE ([facility] )    [address] [address] [phone]            Nov 29, 2018  9:30 AM CST   (Arrive by 9:15 AM)   Ekg Stress Nm Lexiscan 30m with HIXRRN, HI STRESS RM1   [facility] ([facility] )    [address] [id number] [phone]            Nov 29, 2018 10:45 AM CST   (Arrive by 10:30 AM)   NM MPI WITH LEXISCAN with HINM1   [facility] NUCLEAR MEDICINE ([facility] )    [address] [place] [id number] [phone]           How do I prepare for my exam? (Food and drink instructions) Day 1 & Day 2: Stop all caffeine 12 hours before the test. This includes coffee, tea, soda pop, chocolate and certain medicines (such as Anacin, Excedrin and NoDoz). Also avoid decaf coffee and tea, as these contain small amounts of caffeine. Stop eating 4 hours  before the test. You may drink water.  How do I prepare for my exam? (Other instructions) You may need to stop some medicines before the test. Follow your doctor s orders. Day 1 & Day 2: *If you take a beta blocker: Do not take your beta-blocker on the day before your test, unless specifically told to by your doctor. And do not take it on the day of your test. Bring it with you to take after the test.  *If you take Aggrenox or dipyridamole (Persantine, Permole), stop taking it 48 hours before your test. *If you take Viagra, Cialis or Levitra, stop taking it 48 hours before your test. *If you take theophylline or aminophylline, stop taking it 12 hours before your test.  For patients with diabetes: *If you take insulin, call your diabetes care team. Ask if you should take a 1/2 dose the morning of your test. *If you take diabetes medicine by mouth, don t take it on the morning of your test. Bring it with you to take after the test. (If you have questions, call your diabetes care team.)  *Do not take nitrates on the day of your test. Do not wear your Nitro-Patch. *No alcohol, smoking or other tobacco for 12 hours before the test.  What should I wear: Please wear a loose two-piece outfit. If you will have an exercise test, bring rubber-soled walking shoes.  How long does the exam take: *This test can take 1-2 days.* ONE day exam: Allow 3-4 hours for test. IF TWO day exam: Allow  minutes PER day for test.  What should I bring: Please bring a list of your medicines (including vitamins, minerals and over-the-counter drugs). Leave your valuables at home.  Do I need a :  No  is needed.  What do I need to tell my doctor? When you arrive, please tell us if you: * Have diabetes * Are breastfeeding * May be pregnant * Have a pacemaker of ICD (implantable defibrillator).  What should I do after the exam: No restrictions, You may resume normal activities.  What is this test: Your doctor has ordered a nuclear  stress test to check how well blood is flowing through your heart. You will either exercise or take a medicine that mimics exercise; we will watch your heart.  Who should I call with questions: If you have any questions, please call the Imaging Department where you will have your exam. Directions, parking instructions, and other information is available on our website, [email or web address].            Nov 29, 2018 11:00 AM CST   ZIOPATCH MONITOR with HI STRESS RM1   HI Electrocardiology ([facility] )    [address] [address] [phone]            Nov 29, 2018 11:30 AM CST   Treatment with HI CLINAC IX   HI Radiation Oncology ([facility] )    [address] [address] [phone]            Nov 30, 2018 11:30 AM CST   Treatment with HI CLINAC IX   HI Radiation Oncology ([facility] )    [address] [address] [phone]              Who to contact     If you have questions or need follow up information about today's clinic visit or your schedule please contact [facility] RAD[facility] ONCOLOGY directly at [phone].  Normal or non-critical lab and imaging results will be communicated to you by Touch of Classichart, letter or phone within 4 business days after the clinic has received the results. If you do not hear from us within 7 days, please contact the clinic through ebindlet or phone. If you have a critical or abnormal lab result, we will notify you by phone as soon as possible.  Submit refill requests through Webchutney or call your pharmacy and they will forward the refill request to us. Please allow 3 business days for your refill to be completed.          Additional Information About Your Visit        Touch of ClassicharDo It In Person Information     Web[employer]ney gives you secure access to your electronic health record. If you see a primary care provider, you can also send messages to your care team and make appointments. If you have questions, please call your  primary care clinic.  If you do not have a primary care provider, please call [phone] and they will assist you.        Care EveryWhere ID     This is your Care EveryWhere ID. This could be used by other organizations to access your [place] medical records  [id number]         Blood Pressure from Last 3 Encounters:   11/21/18 122/70   11/20/18 123/79   10/29/18 122/76    Weight from Last 3 Encounters:   11/21/18 93.4 kg (206 lb)   11/20/18 93 kg (205 lb)   10/29/18 93.4 kg (206 lb)              Today, you had the following     No orders found for display       Primary Care Provider Office Phone # Fax #    Jolly Adrianna [phone]       [facility] [address] [facility] [address]        Equal Access to Services     DIAMOND CARRION : Hadii aad ku hadasho Soomaali, waaxda luqadaha, qaybta kaalmada adecharlotteyada, karolina jiménez . So [facility] [phone].    ATENCIÓN: Si habla español, tiene a robison disposición servicios gratuitos de asistencia lingüística. Llame al [phone].    We comply with applicable federal civil rights laws and [place] laws. We do not discriminate on the basis of race, color, national origin, age, disability, sex, sexual orientation, or gender identity.            Thank you!     Thank you for choosing [facility]  for your care. Our goal is always to provide you with excellent care. Hearing back from our patients is one way we can continue to improve our services. Please take a few minutes to complete the written survey that you may receive in the mail after your visit with us. Thank you!             Your Updated Medication List - Protect others around you: Learn how to safely use, store and throw away your medicines at www.disposemymeds.org.          This list is accurate as of 11/27/18 11:33 AM.  Always use your most recent med list.                   Brand Name Dispense Instructions for use Diagnosis    acetaminophen 500 MG tablet     "TYLENOL     Take 1,000 mg by mouth daily as needed        aspirin 325 MG tablet    ASA     Take 325 mg by mouth daily        Calcium-Magnesium-Zinc 333-133-5 MG Tabs per tablet      Take 1 tablet by mouth 2 times daily (with meals)        lisinopril-hydrochlorothiazide 10-12.5 MG per tablet    PRINZIDE/ZESTORETIC     Take 1 tablet by mouth daily        magnesium gluconate 250 MG tablet    MAGONATE     Take by mouth daily        mineral oil-hydrophilic petrolatum           * nicotine 14 MG/24HR 24 hr patch    NICODERM CQ     PLACE 1 PATCH ONTO THE SKIN EVERY 24 HOURS. DO NOT CUT PATCH; ROTATE SITES.        * nicotine 21 MG/24HR 24 hr patch    NICODERM CQ          order for DME     21 days    Equipment being ordered: Wound care supplies, 1 each daily x 21 days Xeroform occlusive gauze 5\" x 9\" Telfa non-adherent pad 8\" x 3\" Kerlix bandage roll 4-1/2\" x 4-1/8 yd ACE wrap, 4 inch (5 total)  Diagnosis: clear cell adenocarcinoma    Clear cell carcinoma (H)       simvastatin 40 MG tablet    ZOCOR     Take 40 mg by mouth At Bedtime        XEROFORM PETROLATUM DRESSING Pads     30 each    5x9\" dressing to forearm daily    Encounter for change or removal of surgical wound dressing       * Notice:  This list has 2 medication(s) that are the same as other medications prescribed for you. Read the directions carefully, and ask your doctor or other care provider to review them with you.      "

## 2018-11-28 ENCOUNTER — ALLIED HEALTH/NURSE VISIT (OUTPATIENT)
Dept: RADIATION ONCOLOGY | Facility: HOSPITAL | Age: 63
End: 2018-11-28

## 2018-11-28 ENCOUNTER — OFFICE VISIT (OUTPATIENT)
Dept: RADIATION ONCOLOGY | Facility: HOSPITAL | Age: 63
End: 2018-11-28
Payer: COMMERCIAL

## 2018-11-28 VITALS
BODY MASS INDEX: 34.28 KG/M2 | HEART RATE: 64 BPM | RESPIRATION RATE: 16 BRPM | WEIGHT: 206 LBS | SYSTOLIC BLOOD PRESSURE: 120 MMHG | DIASTOLIC BLOOD PRESSURE: 78 MMHG

## 2018-11-28 DIAGNOSIS — C06.9 SQUAMOUS CELL CARCINOMA OF ORAL CAVITY (H): Primary | ICD-10-CM

## 2018-11-28 PROCEDURE — 77412 RADIATION TX DELIVERY LVL 3: CPT | Performed by: RADIOLOGY

## 2018-11-28 PROCEDURE — 77387 GUIDANCE FOR RADJ TX DLVR: CPT | Performed by: RADIOLOGY

## 2018-11-28 ASSESSMENT — PAIN SCALES - GENERAL: PAINLEVEL: NO PAIN (0)

## 2018-11-28 NOTE — PROGRESS NOTES
Service Date: 2018      RADIATION ONCOLOGY PROGRESS NOTE      REFERRING PHYSICIANS:  Leslie Garza MD; Jolly Rodas NP      DIAGNOSIS:  Carcinoma of the oral cavity, epithelial-myoepithelial carcinoma, stage T4a N0 M0.        RADIATION RX:  Patient has received 1200 cGy with postoperative adjuvant intent for the above-described oral cavity tumor.       SUBJECTIVE:  Doing fine, really notes little in the way of side effects.      OBJECTIVE:  Weight 206 pounds.  Examination of the treated area reveals no treatment-related changes.  Oral mucosa and graft in the right superior aspect remain moist and intact.         IMPRESSION:  Routine tolerance to radiation therapy with postoperative adjuvant intent for the above diagnosis.        PLAN:  Continue treatment as planned.         MARTIN ROWE MD             D: 2018   T: 2018   MT: AMMY      Name:     LUCA HOLDER   MRN:      [id number]        Account:      [id number]   :      1955           Service Date: 2018      Document: [id number]       cc: Jolly Garza MD

## 2018-11-28 NOTE — PROGRESS NOTES
Martita Burden received radiation therapy treatment today 11/28/18.    Chace Lawrence  November 28, 2018  11:32 AM

## 2018-11-28 NOTE — MR AVS SNAPSHOT
After Visit Summary   11/28/2018    Ms. 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)    -  1       Follow-ups after your visit        Your next 10 appointments already scheduled     Nov 29, 2018  7:30 AM CST   (Arrive by 7:15 AM)   NM INJECTION with [id number]   HI NUCLEAR MEDICINE ([facility] )    [address] [address]   [phone]            Nov 29, 2018  8:45 AM CST   (Arrive by 8:30 AM)   NM SCAN3 with HINM1   HI NUCLEAR MEDICINE ([facility] )    [address] [address]   [phone]            Nov 29, 2018  9:30 AM CST   (Arrive by 9:15 AM)   Ekg Stress Nm Lexiscan 30m with HIXRRN, HI STRESS RM1   HI XRAY [facility] ([facility] )    [address] [address]   [phone]            Nov 29, 2018 10:45 AM CST   (Arrive by 10:30 AM)   NM MPI WITH LEXISCAN with HINM1   [facility]UC[facility] ([facility] )    [address] [place] [address]   [phone]           How do I prepare for my exam? (Food and drink instructions) Day 1 & Day 2: Stop all caffeine 12 hours before the test. This includes coffee, tea, soda pop, chocolate and certain medicines (such as Anacin, Excedrin and NoDoz). Also avoid decaf coffee and tea, as these contain small amounts of caffeine. Stop eating 4 hours before the test. You may drink water.  How do I prepare for my exam? (Other instructions) You may need to stop some medicines before the test. Follow your doctor s orders. Day 1 & Day 2: *If you take a beta blocker: Do not take your beta-blocker on the day before your test, unless specifically told to by your doctor. And do not take it on the day of your test. Bring  it with you to take after the test.  *If you take Aggrenox or dipyridamole (Persantine, Permole), stop taking it 48 hours before your test. *If you take Viagra, Cialis or Levitra, stop taking it 48 hours before your test. *If you take theophylline or aminophylline, stop taking it 12 hours before your test.  For patients with diabetes: *If you take insulin, call your diabetes care team. Ask if you should take a 1/2 dose the morning of your test. *If you take diabetes medicine by mouth, don t take it on the morning of your test. Bring it with you to take after the test. (If you have questions, call your diabetes care team.)  *Do not take nitrates on the day of your test. Do not wear your Nitro-Patch. *No alcohol, smoking or other tobacco for 12 hours before the test.  What should I wear: Please wear a loose two-piece outfit. If you will have an exercise test, bring rubber-soled walking shoes.  How long does the exam take: *This test can take 1-2 days.* ONE day exam: Allow 3-4 hours for test. IF TWO day exam: Allow  minutes PER day for test.  What should I bring: Please bring a list of your medicines (including vitamins, minerals and over-the-counter drugs). Leave your valuables at home.  Do I need a :  No  is needed.  What do I need to tell my doctor? When you arrive, please tell us if you: * Have diabetes * Are breastfeeding * May be pregnant * Have a pacemaker of ICD (implantable defibrillator).  What should I do after the exam: No restrictions, You may resume normal activities.  What is this test: Your doctor has ordered a nuclear stress test to check how well blood is flowing through your heart. You will either exercise or take a medicine that mimics exercise; we will watch your heart.  Who should I call with questions: If you have any questions, please call the Imaging Department where you will have your exam. Directions, parking instructions, and other information is available on our website,  [email or web address].            Nov 29, 2018 11:00 AM CST   ZIOPATCH MONITOR with HI STRESS RM1   HI Electrocardiology ([facility] )    [address] [phone] [phone]            Nov 29, 2018 11:30 AM CST   Treatment with HI CLINAC IX   HI Radiation Oncology ([facility] )    [address] [phone]            Nov 30, 2018 11:30 AM CST   Treatment with HI CLINAC IX   HI Radiation Oncology ([facility] )    [address] [id number] [phone]            Dec 03, 2018 11:30 AM CST   Treatment with HI CLINAC IX   HI Radiation Oncology ([facility] )    [address] [phone] [phone]            Dec 04, 2018 11:30 AM CST   Treatment with HI CLINAC IX   HI Radiation Oncology ([facility] )    [address] [phone] [phone]              Who to contact     If you have questions or need follow up information about today's clinic visit or your schedule please contact [facility] RAD[facility]COLOG[facility] directly at [phone].  Normal or non-critical lab and imaging results will be communicated to you by [employer] Fabricationhart, letter or phone within 4 business days after the clinic has received the results. If you do not hear from us within 7 days, please contact the clinic through [employer] or phone. If you have a critical or abnormal lab result, we will notify you by phone as soon as possible.  Submit refill requests through NexDefense or call your pharmacy and they will forward the refill request to us. Please allow 3 business days for your refill to be completed.          Additional Information About Your Visit        NexDefense Information     NexDefense gives you secure access to your electronic health record. If you see a primary care provider, you can also send messages to your care team and make appointments. If you have questions, please call your primary care  clinic.  If you do not have a primary care provider, please call [phone] and they will assist you.        Care EveryWhere ID     This is your Care EveryWhere ID. This could be used by other organizations to access your [place] medical records  [id number]        Your Vitals Were     Pulse Respirations BMI (Body Mass Index)             64 16 34.28 kg/m2          Blood Pressure from Last 3 Encounters:   11/28/18 120/78   11/21/18 122/70   11/20/18 123/79    Weight from Last 3 Encounters:   11/28/18 93.4 kg (206 lb)   11/21/18 93.4 kg (206 lb)   11/20/18 93 kg (205 lb)              Today, you had the following     No orders found for display       Primary Care Provider Office Phone # Fax #    Jolly Adrianna [phone]       [facility] [address] [facility] [address]        Equal Access to Services     JJ CARRION : Hadii onesimo mccoyo Sosean, waaxda luqadaha, qaybta kaalmada adecharlotteyabola, karolina jiménez . So [facility] [phone].    ATENCIÓN: Si habla español, tiene a robison disposición servicios gratuitos de asistencia lingüística. Llame al [phone].    We comply with applicable federal civil rights laws and [place] laws. We do not discriminate on the basis of race, color, national origin, age, disability, sex, sexual orientation, or gender identity.            Thank you!     Thank you for choosing [facility]  for your care. Our goal is always to provide you with excellent care. Hearing back from our patients is one way we can continue to improve our services. Please take a few minutes to complete the written survey that you may receive in the mail after your visit with us. Thank you!             Your Updated Medication List - Protect others around you: Learn how to safely use, store and throw away your medicines at www.disposemymeds.org.          This list is accurate as of 11/28/18  2:27 PM.  Always use your most recent med list.                    "Brand Name Dispense Instructions for use Diagnosis    acetaminophen 500 MG tablet    TYLENOL     Take 1,000 mg by mouth daily as needed        aspirin 325 MG tablet    ASA     Take 325 mg by mouth daily        Calcium-Magnesium-Zinc 333-133-5 MG Tabs per tablet      Take 1 tablet by mouth 2 times daily (with meals)        lisinopril-hydrochlorothiazide 10-12.5 MG per tablet    PRINZIDE/ZESTORETIC     Take 1 tablet by mouth daily        magnesium gluconate 250 MG tablet    MAGONATE     Take by mouth daily        mineral oil-hydrophilic petrolatum external ointment           * nicotine 14 MG/24HR 24 hr patch    NICODERM CQ     PLACE 1 PATCH ONTO THE SKIN EVERY 24 HOURS. DO NOT CUT PATCH; ROTATE SITES.        * nicotine 21 MG/24HR 24 hr patch    NICODERM CQ          order for DME     21 days    Equipment being ordered: Wound care supplies, 1 each daily x 21 days Xeroform occlusive gauze 5\" x 9\" Telfa non-adherent pad 8\" x 3\" Kerlix bandage roll 4-1/2\" x 4-1/8 yd ACE wrap, 4 inch (5 total)  Diagnosis: clear cell adenocarcinoma    Clear cell carcinoma (H)       simvastatin 40 MG tablet    ZOCOR     Take 40 mg by mouth At Bedtime        XEROFORM PETROLATUM DRESSING Pads     30 each    5x9\" dressing to forearm daily    Encounter for change or removal of surgical wound dressing       * Notice:  This list has 2 medication(s) that are the same as other medications prescribed for you. Read the directions carefully, and ask your doctor or other care provider to review them with you.      "

## 2018-11-28 NOTE — MR AVS SNAPSHOT
After Visit Summary   11/28/2018    Ms. 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)    -  1       Follow-ups after your visit        Your next 10 appointments already scheduled     Nov 28, 2018 11:45 AM CST   on treatment visit with Dorian Weaver MD   HI Radiation Oncology ([facility] )    [address] [id number] [phone]            Nov 29, 2018  7:30 AM CST   (Arrive by 7:15 AM)   NM INJECTION with [id number]   HI NUCLEAR MEDICINE ([facility] )    [address] [address] [phone]            Nov 29, 2018  8:45 AM CST   (Arrive by 8:30 AM)   NM SCAN3 with HINM1   [facility] NUCLEAR MEDICINE ([facility] )    [address] [id number] [phone]            Nov 29, 2018  9:30 AM CST   (Arrive by 9:15 AM)   Ekg Stress Nm Lexiscan 30m with HIXRRN, HI STRESS RM1   [facility] ([facility] )    [address] [phone] [phone]            Nov 29, 2018 10:45 AM CST   (Arrive by 10:30 AM)   NM MPI WITH LEXISCAN with HINM1   HI NUCLEAR MEDICINE ([facility] )    [address] [place] [id number] [phone]           How do I prepare for my exam? (Food and drink instructions) Day 1 & Day 2: Stop all caffeine 12 hours before the test. This includes coffee, tea, soda pop, chocolate and certain medicines (such as Anacin, Excedrin and NoDoz). Also avoid decaf coffee and tea, as these contain small amounts of caffeine. Stop eating 4 hours before the test. You may drink water.  How do I prepare for my exam? (Other instructions) You may need to stop some medicines before the test. Follow your doctor s orders. Day 1 &  Day 2: *If you take a beta blocker: Do not take your beta-blocker on the day before your test, unless specifically told to by your doctor. And do not take it on the day of your test. Bring it with you to take after the test.  *If you take Aggrenox or dipyridamole (Persantine, Permole), stop taking it 48 hours before your test. *If you take Viagra, Cialis or Levitra, stop taking it 48 hours before your test. *If you take theophylline or aminophylline, stop taking it 12 hours before your test.  For patients with diabetes: *If you take insulin, call your diabetes care team. Ask if you should take a 1/2 dose the morning of your test. *If you take diabetes medicine by mouth, don t take it on the morning of your test. Bring it with you to take after the test. (If you have questions, call your diabetes care team.)  *Do not take nitrates on the day of your test. Do not wear your Nitro-Patch. *No alcohol, smoking or other tobacco for 12 hours before the test.  What should I wear: Please wear a loose two-piece outfit. If you will have an exercise test, bring rubber-soled walking shoes.  How long does the exam take: *This test can take 1-2 days.* ONE day exam: Allow 3-4 hours for test. IF TWO day exam: Allow  minutes PER day for test.  What should I bring: Please bring a list of your medicines (including vitamins, minerals and over-the-counter drugs). Leave your valuables at home.  Do I need a :  No  is needed.  What do I need to tell my doctor? When you arrive, please tell us if you: * Have diabetes * Are breastfeeding * May be pregnant * Have a pacemaker of ICD (implantable defibrillator).  What should I do after the exam: No restrictions, You may resume normal activities.  What is this test: Your doctor has ordered a nuclear stress test to check how well blood is flowing through your heart. You will either exercise or take a medicine that mimics exercise; we will watch your heart.  Who should I call with  questions: If you have any questions, please call the Imaging Department where you will have your exam. Directions, parking instructions, and other information is available on our website, [email or web address].            Nov 29, 2018 11:00 AM CST   ZIOPATCH MONITOR with HI STRESS RM1   HI Electrocardiology ([facility] )    [address] [address] [phone]            Nov 29, 2018 11:30 AM CST   Treatment with HI CLINAC IX   HI Radiation Oncology ([facility] )    [address] [phone]            Nov 30, 2018 11:30 AM CST   Treatment with HI CLINAC IX   HI Radiation Oncology ([facility] )    [address] [phone]            Dec 03, 2018 11:30 AM CST   Treatment with HI CLINAC IX   HI Radiation Oncology ([facility] )    [address] [phone]              Who to contact     If you have questions or need follow up information about today's clinic visit or your schedule please contact [facility] RADIATION ONCOLOGY directly at [phone].  Normal or non-critical lab and imaging results will be communicated to you by Flixsterhart, letter or phone within 4 business days after the clinic has received the results. If you do not hear from us within 7 days, please contact the clinic through Flixsterhart or phone. If you have a critical or abnormal lab result, we will notify you by phone as soon as possible.  Submit refill requests through Qzzr or call your pharmacy and they will forward the refill request to us. Please allow 3 business days for your refill to be completed.          Additional Information About Your Visit        FlixsterharHopster TV Information     Qzzr gives you secure access to your electronic health record. If you see a primary care provider, you can also send messages to your care team and make appointments. If you have questions, please call your primary  care clinic.  If you do not have a primary care provider, please call [phone] and they will assist you.        Care EveryWhere ID     This is your Care EveryWhere ID. This could be used by other organizations to access your [place] medical records  [id number]         Blood Pressure from Last 3 Encounters:   11/21/18 122/70   11/20/18 123/79   10/29/18 122/76    Weight from Last 3 Encounters:   11/21/18 93.4 kg (206 lb)   11/20/18 93 kg (205 lb)   10/29/18 93.4 kg (206 lb)              Today, you had the following     No orders found for display       Primary Care Provider Office Phone # Fax #    Jolly Adrianna [phone]       [facility] [address] [facility] [address]        Equal Access to Services     JJ CARRION : Hadii aad ku hadasho Sosean, waaxda luqadaha, qaybta kaalmada adecharlotteyabola, karolina jiménez . So [facility] [phone].    ATENCIÓN: Si habla español, tiene a robison disposición servicios gratuitos de asistencia lingüística. Moses al [phone].    We comply with applicable federal civil rights laws and [place] laws. We do not discriminate on the basis of race, color, national origin, age, disability, sex, sexual orientation, or gender identity.            Thank you!     Thank you for choosing [facility]  for your care. Our goal is always to provide you with excellent care. Hearing back from our patients is one way we can continue to improve our services. Please take a few minutes to complete the written survey that you may receive in the mail after your visit with us. Thank you!             Your Updated Medication List - Protect others around you: Learn how to safely use, store and throw away your medicines at www.disposemymeds.org.          This list is accurate as of 11/28/18 11:43 AM.  Always use your most recent med list.                   Brand Name Dispense Instructions for use Diagnosis    acetaminophen 500 MG tablet    TYLENOL     " Take 1,000 mg by mouth daily as needed        aspirin 325 MG tablet    ASA     Take 325 mg by mouth daily        Calcium-Magnesium-Zinc 333-133-5 MG Tabs per tablet      Take 1 tablet by mouth 2 times daily (with meals)        lisinopril-hydrochlorothiazide 10-12.5 MG per tablet    PRINZIDE/ZESTORETIC     Take 1 tablet by mouth daily        magnesium gluconate 250 MG tablet    MAGONATE     Take by mouth daily        mineral oil-hydrophilic petrolatum external ointment           * nicotine 14 MG/24HR 24 hr patch    NICODERM CQ     PLACE 1 PATCH ONTO THE SKIN EVERY 24 HOURS. DO NOT CUT PATCH; ROTATE SITES.        * nicotine 21 MG/24HR 24 hr patch    NICODERM CQ          order for DME     21 days    Equipment being ordered: Wound care supplies, 1 each daily x 21 days Xeroform occlusive gauze 5\" x 9\" Telfa non-adherent pad 8\" x 3\" Kerlix bandage roll 4-1/2\" x 4-1/8 yd ACE wrap, 4 inch (5 total)  Diagnosis: clear cell adenocarcinoma    Clear cell carcinoma (H)       simvastatin 40 MG tablet    ZOCOR     Take 40 mg by mouth At Bedtime        XEROFORM PETROLATUM DRESSING Pads     30 each    5x9\" dressing to forearm daily    Encounter for change or removal of surgical wound dressing       * Notice:  This list has 2 medication(s) that are the same as other medications prescribed for you. Read the directions carefully, and ask your doctor or other care provider to review them with you.      "

## 2018-11-29 ENCOUNTER — ALLIED HEALTH/NURSE VISIT (OUTPATIENT)
Dept: RADIATION ONCOLOGY | Facility: HOSPITAL | Age: 63
End: 2018-11-29

## 2018-11-29 DIAGNOSIS — C06.9 SQUAMOUS CELL CARCINOMA OF ORAL CAVITY (H): Primary | ICD-10-CM

## 2018-11-29 PROCEDURE — 77412 RADIATION TX DELIVERY LVL 3: CPT | Performed by: RADIOLOGY

## 2018-11-29 PROCEDURE — 77387 GUIDANCE FOR RADJ TX DLVR: CPT | Performed by: RADIOLOGY

## 2018-11-29 NOTE — PROGRESS NOTES
Martita Burden received radiation therapy treatment today 11/29/18.    Nakul Del Cid  November 29, 2018  11:09 AM

## 2018-11-29 NOTE — MR AVS SNAPSHOT
After Visit Summary   11/29/2018    Ms. 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)    -  1       Follow-ups after your visit        Your next 10 appointments already scheduled     Nov 29, 2018 11:30 AM CST   Treatment with HI CLINAC IX   HI Radiation Oncology ([facility] )    [address] [address]   [phone]            Nov 30, 2018 11:30 AM CST   Treatment with HI CLINAC IX   HI Radiation Oncology ([facility] )    [address] [address]   [phone]            Dec 03, 2018 11:30 AM CST   Treatment with HI CLINAC IX   HI Radiation Oncology ([facility] )    [address] [address]   [phone]            Dec 04, 2018 11:30 AM CST   Treatment with HI CLINAC IX   HI Radiation Oncology ([facility] )    [address] [address]   [phone]            Dec 05, 2018 11:30 AM CST   Treatment with HI CLINAC IX   HI Radiation Oncology ([facility] )    [address] [address]   [phone]            Dec 06, 2018 11:30 AM CST   Treatment with HI CLINAC IX   HI Radiation Oncology ([facility] )    [address] [address]   [phone]            Dec 07, 2018 11:30 AM CST   Treatment with HI CLINAC IX   HI Radiation Oncology ([facility] )    [address] [address]   [phone]            Dec 10, 2018 11:30 AM CST   Treatment with HI CLINAC IX   HI Radiation Oncology ([facility] )    [address] [address]   [phone]            Dec 11, 2018 11:30 AM CST   Treatment with HI CLINAC IX   HI Radiation Oncology  ([facility] )    [address] [address] [phone]            Dec 12, 2018 11:30 AM CST   Treatment with HI CLINAC IX   HI Radiation Oncology ([facility] )    [address] [address] [phone]              Who to contact     If you have questions or need follow up information about today's clinic visit or your schedule please contact [facility] RADIATION ONCOLOGY directly at [phone].  Normal or non-critical lab and imaging results will be communicated to you by SoundTaghart, letter or phone within 4 business days after the clinic has received the results. If you do not hear from us within 7 days, please contact the clinic through 2threadst or phone. If you have a critical or abnormal lab result, we will notify you by phone as soon as possible.  Submit refill requests through EXO5 or call your pharmacy and they will forward the refill request to us. Please allow 3 business days for your refill to be completed.          Additional Information About Your Visit        EXO5 Information     EXO5 gives you secure access to your electronic health record. If you see a primary care provider, you can also send messages to your care team and make appointments. If you have questions, please call your primary care clinic.  If you do not have a primary care provider, please call [phone] and they will assist you.        Care EveryWhere ID     This is your Care EveryWhere ID. This could be used by other organizations to access your [place] medical records  [id number]         Blood Pressure from Last 3 Encounters:   11/28/18 120/78   11/21/18 122/70   11/20/18 123/79    Weight from Last 3 Encounters:   11/28/18 93.4 kg (206 lb)   11/21/18 93.4 kg (206 lb)   11/20/18 93 kg (205 lb)              Today, you had the following     No orders found for display       Primary Care Provider Office Phone # Fax #    Jolly ErickKandirodo [phone]        [facility] [address] [facility] [address]        Equal Access to Services     RAYDIAMOND MURALI : Hadii aad ku hadsaundraharpal Sosean, waderekda luion, qastantonta shayanjhonnybola ding, karolina carlsonjoecarlota urena. So [facility] [phone].    ATENCIÓN: Si habla español, tiene a robison disposición servicios gratuitos de asistencia lingüística. Llame al [phone].    We comply with applicable federal civil rights laws and [place] laws. We do not discriminate on the basis of race, color, national origin, age, disability, sex, sexual orientation, or gender identity.            Thank you!     Thank you for choosing [facility]  for your care. Our goal is always to provide you with excellent care. Hearing back from our patients is one way we can continue to improve our services. Please take a few minutes to complete the written survey that you may receive in the mail after your visit with us. Thank you!             Your Updated Medication List - Protect others around you: Learn how to safely use, store and throw away your medicines at www.disposemymeds.org.          This list is accurate as of 11/29/18 11:14 AM.  Always use your most recent med list.                   Brand Name Dispense Instructions for use Diagnosis    acetaminophen 500 MG tablet    TYLENOL     Take 1,000 mg by mouth daily as needed        aspirin 325 MG tablet    ASA     Take 325 mg by mouth daily        Calcium-Magnesium-Zinc 333-133-5 MG Tabs per tablet      Take 1 tablet by mouth 2 times daily (with meals)        lisinopril-hydrochlorothiazide 10-12.5 MG per tablet    PRINZIDE/ZESTORETIC     Take 1 tablet by mouth daily        magnesium gluconate 250 MG tablet    MAGONATE     Take by mouth daily        mineral oil-hydrophilic petrolatum external ointment           * nicotine 14 MG/24HR 24 hr patch    NICODERM CQ     PLACE 1 PATCH ONTO THE SKIN EVERY 24 HOURS. DO NOT CUT PATCH; ROTATE SITES.        * nicotine 21 MG/24HR 24 hr patch     "VILMA           order for DME     21 days    Equipment being ordered: Wound care supplies, 1 each daily x 21 days Xeroform occlusive gauze 5\" x 9\" Telfa non-adherent pad 8\" x 3\" Kerlix bandage roll 4-1/2\" x 4-1/8 yd ACE wrap, 4 inch (5 total)  Diagnosis: clear cell adenocarcinoma    Clear cell carcinoma (H)       simvastatin 40 MG tablet    ZOCOR     Take 40 mg by mouth At Bedtime        XEROFORM PETROLATUM DRESSING Pads     30 each    5x9\" dressing to forearm daily    Encounter for change or removal of surgical wound dressing       * Notice:  This list has 2 medication(s) that are the same as other medications prescribed for you. Read the directions carefully, and ask your doctor or other care provider to review them with you.      "

## 2018-11-30 ENCOUNTER — ALLIED HEALTH/NURSE VISIT (OUTPATIENT)
Dept: RADIATION ONCOLOGY | Facility: HOSPITAL | Age: 63
End: 2018-11-30

## 2018-11-30 DIAGNOSIS — C06.9 SQUAMOUS CELL CARCINOMA OF ORAL CAVITY (H): Primary | ICD-10-CM

## 2018-11-30 PROCEDURE — 77412 RADIATION TX DELIVERY LVL 3: CPT | Performed by: RADIOLOGY

## 2018-11-30 PROCEDURE — 77387 GUIDANCE FOR RADJ TX DLVR: CPT | Performed by: RADIOLOGY

## 2018-11-30 NOTE — MR AVS SNAPSHOT
After Visit Summary   11/30/2018    Ms. 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)    -  1       Follow-ups after your visit        Your next 10 appointments already scheduled     Dec 03, 2018 11:30 AM CST   Treatment with HI CLINAC IX   HI Radiation Oncology ([facility] )    [address] [address]   [phone]            Dec 04, 2018 11:30 AM CST   Treatment with HI CLINAC IX   HI Radiation Oncology ([facility] )    [address] [address]   [phone]            Dec 05, 2018 11:30 AM CST   Treatment with HI CLINAC IX   HI Radiation Oncology ([facility] )    [address] [address]   [phone]            Dec 06, 2018 11:30 AM CST   Treatment with HI CLINAC IX   HI Radiation Oncology ([facility] )    [address] [address]   [phone]            Dec 07, 2018 11:30 AM CST   Treatment with HI CLINAC IX   HI Radiation Oncology ([facility] )    [address] [address]   [phone]            Dec 10, 2018 11:30 AM CST   Treatment with HI CLINAC IX   HI Radiation Oncology ([facility] )    [address] [address]   [phone]            Dec 11, 2018 11:30 AM CST   Treatment with HI CLINAC IX   HI Radiation Oncology ([facility] )    [address] [address]   [phone]            Dec 12, 2018 11:30 AM CST   Treatment with HI CLINAC IX   HI Radiation Oncology ([facility] )    [address] [address]   [phone]            Dec 13, 2018 11:30 AM CST   Treatment with HI CLINAC IX   HI Radiation Oncology  ([facility] )    [address] [address] [phone]            Dec 14, 2018 11:30 AM CST   Treatment with HI CLINAC IX   HI Radiation Oncology ([facility] )    [address] [address] [phone]              Who to contact     If you have questions or need follow up information about today's clinic visit or your schedule please contact [facility] directly at [phone].  Normal or non-critical lab and imaging results will be communicated to you by BoxCasthart, letter or phone within 4 business days after the clinic has received the results. If you do not hear from us within 7 days, please contact the clinic through Think Gamingt or phone. If you have a critical or abnormal lab result, we will notify you by phone as soon as possible.  Submit refill requests through Tango Networks or call your pharmacy and they will forward the refill request to us. Please allow 3 business days for your refill to be completed.          Additional Information About Your Visit        Tango Networks Information     Tango Networks gives you secure access to your electronic health record. If you see a primary care provider, you can also send messages to your care team and make appointments. If you have questions, please call your primary care clinic.  If you do not have a primary care provider, please call [phone] and they will assist you.        Care EveryWhere ID     This is your Care EveryWhere ID. This could be used by other organizations to access your [place] medical records  [id number]         Blood Pressure from Last 3 Encounters:   11/28/18 120/78   11/21/18 122/70   11/20/18 123/79    Weight from Last 3 Encounters:   11/28/18 93.4 kg (206 lb)   11/21/18 93.4 kg (206 lb)   11/20/18 93 kg (205 lb)              Today, you had the following     No orders found for display       Primary Care Provider Office Phone # Fax #    Jolly ErickKandirodo [phone]        [facility] [address] [facility] [address]        Equal Access to Services     RAYDIAMOND MURALI : Hadii aad ku hadsaundraharpal Sosean, waaxda lukonradadaha, qaybta shayanjhonnybola ding, karolina carlsonjoecarlota urena. So [facility] [phone].    ATENCIÓN: Si habla español, tiene a robison disposición servicios gratuitos de asistencia lingüística. Llame al [phone].    We comply with applicable federal civil rights laws and [place] laws. We do not discriminate on the basis of race, color, national origin, age, disability, sex, sexual orientation, or gender identity.            Thank you!     Thank you for choosing [facility]  for your care. Our goal is always to provide you with excellent care. Hearing back from our patients is one way we can continue to improve our services. Please take a few minutes to complete the written survey that you may receive in the mail after your visit with us. Thank you!             Your Updated Medication List - Protect others around you: Learn how to safely use, store and throw away your medicines at www.disposemymeds.org.          This list is accurate as of 11/30/18 11:55 AM.  Always use your most recent med list.                   Brand Name Dispense Instructions for use Diagnosis    acetaminophen 500 MG tablet    TYLENOL     Take 1,000 mg by mouth daily as needed        aspirin 325 MG tablet    ASA     Take 325 mg by mouth daily        Calcium-Magnesium-Zinc 333-133-5 MG Tabs per tablet      Take 1 tablet by mouth 2 times daily (with meals)        lisinopril-hydrochlorothiazide 10-12.5 MG tablet    PRINZIDE/ZESTORETIC     Take 1 tablet by mouth daily        magnesium gluconate 250 MG tablet    MAGONATE     Take by mouth daily        mineral oil-hydrophilic petrolatum external ointment           * nicotine 14 MG/24HR 24 hr patch    NICODERM CQ     PLACE 1 PATCH ONTO THE SKIN EVERY 24 HOURS. DO NOT CUT PATCH; ROTATE SITES.        * nicotine 21 MG/24HR 24 hr patch    NICODERM  "CQ          order for DME     21 days    Equipment being ordered: Wound care supplies, 1 each daily x 21 days Xeroform occlusive gauze 5\" x 9\" Telfa non-adherent pad 8\" x 3\" Kerlix bandage roll 4-1/2\" x 4-1/8 yd ACE wrap, 4 inch (5 total)  Diagnosis: clear cell adenocarcinoma    Clear cell carcinoma (H)       simvastatin 40 MG tablet    ZOCOR     Take 40 mg by mouth At Bedtime        XEROFORM PETROLATUM DRESSING Pads     30 each    5x9\" dressing to forearm daily    Encounter for change or removal of surgical wound dressing       * Notice:  This list has 2 medication(s) that are the same as other medications prescribed for you. Read the directions carefully, and ask your doctor or other care provider to review them with you.      "

## 2018-11-30 NOTE — PROGRESS NOTES
Martita Burden received radiation therapy treatment today 11/30/18.    Lizzy Mccray  November 30, 2018  11:49 AM

## 2018-12-03 ENCOUNTER — ALLIED HEALTH/NURSE VISIT (OUTPATIENT)
Dept: RADIATION ONCOLOGY | Facility: HOSPITAL | Age: 63
End: 2018-12-03
Attending: RADIOLOGY
Payer: COMMERCIAL

## 2018-12-03 DIAGNOSIS — C06.9 SQUAMOUS CELL CARCINOMA OF ORAL CAVITY (H): Primary | ICD-10-CM

## 2018-12-03 PROCEDURE — 77412 RADIATION TX DELIVERY LVL 3: CPT | Performed by: RADIOLOGY

## 2018-12-03 PROCEDURE — 77387 GUIDANCE FOR RADJ TX DLVR: CPT | Performed by: RADIOLOGY

## 2018-12-03 NOTE — PROGRESS NOTES
Martita Burden received radiation therapy treatment today 12/03/18.    Chace [place]  December 3, 2018  11:03 AM

## 2018-12-03 NOTE — MR AVS SNAPSHOT
After Visit Summary   12/3/2018    Ms. 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)    -  1       Follow-ups after your visit        Your next 10 appointments already scheduled     Dec 03, 2018 11:30 AM CST   Treatment with [facility]   [facility] ([facility] )    [address] [address]   [phone]            Dec 04, 2018 11:30 AM CST   Treatment with [facility]   [facility] ([facility] )    [address] [address]   [phone]            Dec 05, 2018 11:30 AM CST   Treatment with [facility]   [facility] ([facility] )    [address] [address]   [phone]            Dec 05, 2018 11:45 AM CST   on treatment visit with Dorian Weaver MD   [facility] ([facility] )    [address] [address]   [phone]            Dec 06, 2018 11:30 AM CST   Treatment with [facility]   [facility] ([facility] )    [address] [address]   [phone]            Dec 07, 2018 11:30 AM CST   Treatment with [facility]   [facility] ([facility] )    [address] [address]   [phone]            Dec 10, 2018 11:30 AM CST   Treatment with [facility]   [facility] ([facility] )    [address] [address]   [phone]            Dec 11, 2018 11:30 AM CST   Treatment with [facility]   [facility] ([facility] )    [address] [address]   [phone]            Dec 12, 2018 11:30 AM CST   Treatment with [facility]   HI  Radiation Oncology ([facility] )    [address] [address] [phone]            Dec 12, 2018 11:45 AM CST   on treatment visit with Dorian Weaver MD   [facility] Radiation [facility] ([facility] )    [address] [address] [phone]              Who to contact     If you have questions or need follow up information about today's clinic visit or your schedule please contact [facility] directly at [phone].  Normal or non-critical lab and imaging results will be communicated to you by Yadiohart, letter or phone within 4 business days after the clinic has received the results. If you do not hear from us within 7 days, please contact the clinic through Q-Bott or phone. If you have a critical or abnormal lab result, we will notify you by phone as soon as possible.  Submit refill requests through Tranz or call your pharmacy and they will forward the refill request to us. Please allow 3 business days for your refill to be completed.          Additional Information About Your Visit        Yadiohart Information     Tranz gives you secure access to your electronic health record. If you see a primary care provider, you can also send messages to your care team and make appointments. If you have questions, please call your primary care clinic.  If you do not have a primary care provider, please call [phone] and they will assist you.        Care EveryWhere ID     This is your Care EveryWhere ID. This could be used by other organizations to access your [facility] medical records  [id number]         Blood Pressure from Last 3 Encounters:   11/28/18 120/78   11/21/18 122/70   11/20/18 123/79    Weight from Last 3 Encounters:   11/28/18 93.4 kg (206 lb)   11/21/18 93.4 kg (206 lb)   11/20/18 93 kg (205 lb)              Today, you had the following     No orders found for display       Primary Care Provider Office Phone # Fax #    [facility]  [facility] [phone]       [facility] [address] [facility] [address]        Equal Access to Services     JJ CARRION : Hadii aad ku hadsaundraharpal Osiris, nabil sonialucia, jodie kaeveline ding, karolina rafaelin hayaajorge smallcharlotte barragan jessy urena. So [facility] [phone].    ATENCIÓN: Si habla español, tiene a robison disposición servicios gratuitos de asistencia lingüística. Llame al [phone].    We comply with applicable federal civil rights laws and [place] laws. We do not discriminate on the basis of race, color, national origin, age, disability, sex, sexual orientation, or gender identity.            Thank you!     Thank you for choosing [facility]  for your care. Our goal is always to provide you with excellent care. Hearing back from our patients is one way we can continue to improve our services. Please take a few minutes to complete the written survey that you may receive in the mail after your visit with us. Thank you!             Your Updated Medication List - Protect others around you: Learn how to safely use, store and throw away your medicines at www.disposemymeds.org.          This list is accurate as of 12/3/18 11:10 AM.  Always use your most recent med list.                   Brand Name Dispense Instructions for use Diagnosis    acetaminophen 500 MG tablet    TYLENOL     Take 1,000 mg by mouth daily as needed        aspirin 325 MG tablet    ASA     Take 325 mg by mouth daily        Calcium-Magnesium-Zinc 333-133-5 MG Tabs per tablet      Take 1 tablet by mouth 2 times daily (with meals)        lisinopril-hydrochlorothiazide 10-12.5 MG tablet    PRINZIDE/ZESTORETIC     Take 1 tablet by mouth daily        magnesium gluconate 250 MG tablet    MAGONATE     Take by mouth daily        mineral oil-hydrophilic petrolatum external ointment           * nicotine 14 MG/24HR 24 hr patch    NICODERM CQ     PLACE 1 PATCH ONTO THE SKIN EVERY 24 HOURS. DO NOT CUT PATCH; ROTATE SITES.        *  "nicotine 21 MG/24HR 24 hr patch    NICODERM CQ          order for DME     21 days    Equipment being ordered: Wound care supplies, 1 each daily x 21 days Xeroform occlusive gauze 5\" x 9\" Telfa non-adherent pad 8\" x 3\" Kerlix bandage roll 4-1/2\" x 4-1/8 yd ACE wrap, 4 inch (5 total)  Diagnosis: clear cell adenocarcinoma    Clear cell carcinoma (H)       simvastatin 40 MG tablet    ZOCOR     Take 40 mg by mouth At Bedtime        XEROFORM PETROLATUM DRESSING Pads     30 each    5x9\" dressing to forearm daily    Encounter for change or removal of surgical wound dressing       * Notice:  This list has 2 medication(s) that are the same as other medications prescribed for you. Read the directions carefully, and ask your doctor or other care provider to review them with you.      "

## 2018-12-04 ENCOUNTER — ALLIED HEALTH/NURSE VISIT (OUTPATIENT)
Dept: RADIATION ONCOLOGY | Facility: HOSPITAL | Age: 63
End: 2018-12-04

## 2018-12-04 DIAGNOSIS — C06.9 SQUAMOUS CELL CARCINOMA OF ORAL CAVITY (H): Primary | ICD-10-CM

## 2018-12-04 PROCEDURE — 77412 RADIATION TX DELIVERY LVL 3: CPT | Performed by: RADIOLOGY

## 2018-12-04 PROCEDURE — 77387 GUIDANCE FOR RADJ TX DLVR: CPT | Performed by: RADIOLOGY

## 2018-12-04 PROCEDURE — 77336 RADIATION PHYSICS CONSULT: CPT | Performed by: RADIOLOGY

## 2018-12-04 NOTE — PROGRESS NOTES
Martita Burden received radiation therapy treatment today 12/04/18.    Chace Lawrence  December 4, 2018  11:34 AM

## 2018-12-04 NOTE — MR AVS SNAPSHOT
After Visit Summary   12/4/2018    Ms. 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)    -  1       Follow-ups after your visit        Your next 10 appointments already scheduled     Dec 05, 2018 11:30 AM CST   Treatment with [facility]   [facility] Radiation Oncology ([facility] )    [address] [address]   [phone]            Dec 05, 2018 11:45 AM CST   on treatment visit with Dorian Weaver MD   [facility] Radiation Oncology ([facility] )    [address] [address]   [phone]            Dec 06, 2018 11:30 AM CST   Treatment with [facility]   [facility] Radiation Oncology ([facility] )    [address] [address]   [phone]            Dec 07, 2018 11:30 AM CST   Treatment with [facility]   [facility] Rad[facility]cology ([facility] )    [address] [address]   [phone]            Dec 10, 2018 11:30 AM CST   Treatment with [facility]   [facility] Radiation [facility]cology ([facility] )    [address] [address]   [phone]            Dec 11, 2018 11:30 AM CST   Treatment with [facility]   [facility] Radiation Oncology ([facility] )    [address] [address]   [phone]            Dec 12, 2018 11:30 AM CST   Treatment with [facility]   [facility] Radiation [facility]cology ([facility] )    [address] [address]   [phone]            Dec 12, 2018 11:45 AM CST   on treatment visit with Dorian Weaver MD   [facility] Radiation [facility]cology ([facility] )    [address] [address]   [phone]            Dec 13, 2018 11:30 AM CST   Treatment with HI  [facility]   [facility] Radiation Oncology ([facility] )    [address] [address] [phone]            Dec 14, 2018 11:30 AM CST   Treatment with [facility]   [facility] Radiation Oncology ([facility] )    [address] [id number] [phone]              Who to contact     If you have questions or need follow up information about today's clinic visit or your schedule please contact [facility] directly at [phone].  Normal or non-critical lab and imaging results will be communicated to you by DebtLESS Communityhart, letter or phone within 4 business days after the clinic has received the results. If you do not hear from us within 7 days, please contact the clinic through [email or web address] or phone. If you have a critical or abnormal lab result, we will notify you by phone as soon as possible.  Submit refill requests through [employer] or call your pharmacy and they will forward the refill request to us. Please allow 3 business days for your refill to be completed.          Additional Information About Your Visit        DebtLESS CommunityharBagels and Bean Information     [employer] gives you secure access to your electronic health record. If you see a primary care provider, you can also send messages to your care team and make appointments. If you have questions, please call your primary care clinic.  If you do not have a primary care provider, please call [phone] and they will assist you.        Care EveryWhere ID     This is your Care EveryWhere ID. This could be used by other organizations to access your [facility] medical records  [id number]         Blood Pressure from Last 3 Encounters:   11/28/18 120/78   11/21/18 122/70   11/20/18 123/79    Weight from Last 3 Encounters:   11/28/18 93.4 kg (206 lb)   11/21/18 93.4 kg (206 lb)   11/20/18 93 kg (205 lb)              Today, you had the following     No orders found for display       Primary Care Provider Office Phone # Fax #    Jolly Adrianna  [phone]       [facility] [address] [facility] [address]        Equal Access to Services     JJ CARRION : Hadii aad ku hadraghav Newell, nabil soniatimha, jodie shayaneveline ding, karolina rafaelin hayaan geovannycharlotte barragan latimurjorge urena. So [facility] [phone].    ATENCIÓN: Si habla español, tiene a robison disposición servicios gratuitos de asistencia lingüística. Llame al [phone].    We comply with applicable federal civil rights laws and [place] laws. We do not discriminate on the basis of race, color, national origin, age, disability, sex, sexual orientation, or gender identity.            Thank you!     Thank you for choosing [facility]  for your care. Our goal is always to provide you with excellent care. Hearing back from our patients is one way we can continue to improve our services. Please take a few minutes to complete the written survey that you may receive in the mail after your visit with us. Thank you!             Your Updated Medication List - Protect others around you: Learn how to safely use, store and throw away your medicines at www.disposemymeds.org.          This list is accurate as of 12/4/18 12:58 PM.  Always use your most recent med list.                   Brand Name Dispense Instructions for use Diagnosis    acetaminophen 500 MG tablet    TYLENOL     Take 1,000 mg by mouth daily as needed        aspirin 325 MG tablet    ASA     Take 325 mg by mouth daily        Calcium-Magnesium-Zinc 333-133-5 MG Tabs per tablet      Take 1 tablet by mouth 2 times daily (with meals)        lisinopril-hydrochlorothiazide 10-12.5 MG tablet    PRINZIDE/ZESTORETIC     Take 1 tablet by mouth daily        magnesium gluconate 250 MG tablet    MAGONATE     Take by mouth daily        mineral oil-hydrophilic petrolatum external ointment           * nicotine 14 MG/24HR 24 hr patch    NICODERM CQ     PLACE 1 PATCH ONTO THE SKIN EVERY 24 HOURS. DO NOT CUT PATCH; ROTATE SITES.        * nicotine  "21 MG/24HR 24 hr patch    NICODERM CQ          order for DME     21 days    Equipment being ordered: Wound care supplies, 1 each daily x 21 days Xeroform occlusive gauze 5\" x 9\" Telfa non-adherent pad 8\" x 3\" Kerlix bandage roll 4-1/2\" x 4-1/8 yd ACE wrap, 4 inch (5 total)  Diagnosis: clear cell adenocarcinoma    Clear cell carcinoma (H)       simvastatin 40 MG tablet    ZOCOR     Take 40 mg by mouth At Bedtime        XEROFORM PETROLATUM DRESSING Pads     30 each    5x9\" dressing to forearm daily    Encounter for change or removal of surgical wound dressing       * Notice:  This list has 2 medication(s) that are the same as other medications prescribed for you. Read the directions carefully, and ask your doctor or other care provider to review them with you.      "

## 2018-12-05 ENCOUNTER — ALLIED HEALTH/NURSE VISIT (OUTPATIENT)
Dept: RADIATION ONCOLOGY | Facility: HOSPITAL | Age: 63
End: 2018-12-05

## 2018-12-05 ENCOUNTER — OFFICE VISIT (OUTPATIENT)
Dept: RADIATION ONCOLOGY | Facility: HOSPITAL | Age: 63
End: 2018-12-05
Attending: RADIOLOGY
Payer: COMMERCIAL

## 2018-12-05 VITALS
SYSTOLIC BLOOD PRESSURE: 112 MMHG | BODY MASS INDEX: 33.95 KG/M2 | DIASTOLIC BLOOD PRESSURE: 78 MMHG | RESPIRATION RATE: 16 BRPM | WEIGHT: 204 LBS | HEART RATE: 80 BPM

## 2018-12-05 DIAGNOSIS — C06.9 SQUAMOUS CELL CARCINOMA OF ORAL CAVITY (H): Primary | ICD-10-CM

## 2018-12-05 PROCEDURE — 77412 RADIATION TX DELIVERY LVL 3: CPT | Performed by: RADIOLOGY

## 2018-12-05 PROCEDURE — 77387 GUIDANCE FOR RADJ TX DLVR: CPT | Performed by: RADIOLOGY

## 2018-12-05 ASSESSMENT — PAIN SCALES - GENERAL: PAINLEVEL: NO PAIN (0)

## 2018-12-05 NOTE — PROGRESS NOTES
Martita Burden received radiation therapy treatment today 12/05/18.    Chace Lawrence  December 5, 2018  11:37 AM

## 2018-12-05 NOTE — MR AVS SNAPSHOT
After Visit Summary   12/5/2018    Ms. 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)    -  1       Follow-ups after your visit        Your next 10 appointments already scheduled     Dec 06, 2018 11:30 AM CST   Treatment with [facility]   [facility] Radiation Oncology ([facility] )    [address] [address]   [phone]            Dec 07, 2018 11:30 AM CST   Treatment with [facility]   [facility] Radiation Oncology ([facility] )    [address] [address]   [phone]            Dec 10, 2018 11:30 AM CST   Treatment with [facility]   [facility] Radiation Oncology ([facility] )    [address] [address]   [phone]            Dec 11, 2018 11:30 AM CST   Treatment with [facility]   [facility] Radiation On[facility] ([facility] )    [address] [address]   [phone]            Dec 12, 2018 11:30 AM CST   Treatment with [facility]   [facility] Radiation Oncology ([facility] )    [address] [address]   [phone]            Dec 12, 2018 11:45 AM CST   on treatment visit with Dorian Weaver MD   [facility] ([facility] )    [address] [address]   [phone]            Dec 13, 2018 11:30 AM CST   Treatment with [facility]   [facility] Rad[facility] ([facility] )    [address] [address]   [phone]            Dec 14, 2018 11:30 AM CST   Treatment with [facility]   [facility] Radiation Oncology ([facility] )    [address] [address]   [phone]            Dec 17, 2018 11:30 AM CST   Treatment with [facility]    [facility] Radiation Oncology ([facility] )    [address] [id number] [phone]            Dec 18, 2018 11:30 AM CST   Treatment with [facility] CL[facility] IX   [facility] Radiation Oncology ([facility] )    [address] [address] [phone]              Who to contact     If you have questions or need follow up information about today's clinic visit or your schedule please contact [facility] directly at [phone].  Normal or non-critical lab and imaging results will be communicated to you by aisle411hart, letter or phone within 4 business days after the clinic has received the results. If you do not hear from us within 7 days, please contact the clinic through AMCADt or phone. If you have a critical or abnormal lab result, we will notify you by phone as soon as possible.  Submit refill requests through [employer] or call your pharmacy and they will forward the refill request to us. Please allow 3 business days for your refill to be completed.          Additional Information About Your Visit        [employer] Information     [employer] gives you secure access to your electronic health record. If you see a primary care provider, you can also send messages to your care team and make appointments. If you have questions, please call your primary care clinic.  If you do not have a primary care provider, please call [phone] and they will assist you.        Care EveryWhere ID     This is your Care EveryWhere ID. This could be used by other organizations to access your [facility] medical records  [id number]        Your Vitals Were     Pulse Respirations BMI (Body Mass Index)             80 16 33.95 kg/m2          Blood Pressure from Last 3 Encounters:   12/05/18 112/78   11/28/18 120/78   11/21/18 122/70    Weight from Last 3 Encounters:   12/05/18 92.5 kg (204 lb)   11/28/18 93.4 kg (206 lb)   11/21/18 93.4 kg (206 lb)              Today, you had the following     No  orders found for display       Primary Care Provider Office Phone # Fax #    Jolly Adrianna [phone]       [facility] [address] [facility] [address]        Equal Access to Services     JJ CARRION : Hadii aad ku hadraghav Sosean, waaxda luqadaha, qaybta kaalmada adeegyada, karolina patriciajorge smallcharlotte buitragocarlota urena. So [facility] [phone].    ATENCIÓN: Si habla español, tiene a robison disposición servicios gratuitos de asistencia lingüística. [facility] [phone].    We comply with applicable federal civil rights laws and [place] laws. We do not discriminate on the basis of race, color, national origin, age, disability, sex, sexual orientation, or gender identity.            Thank you!     Thank you for choosing [facility]  for your care. Our goal is always to provide you with excellent care. Hearing back from our patients is one way we can continue to improve our services. Please take a few minutes to complete the written survey that you may receive in the mail after your visit with us. Thank you!             Your Updated Medication List - Protect others around you: Learn how to safely use, store and throw away your medicines at www.disposemymeds.org.          This list is accurate as of 12/5/18  4:39 PM.  Always use your most recent med list.                   Brand Name Dispense Instructions for use Diagnosis    acetaminophen 500 MG tablet    TYLENOL     Take 1,000 mg by mouth daily as needed        aspirin 325 MG tablet    ASA     Take 325 mg by mouth daily        Calcium-Magnesium-Zinc 333-133-5 MG Tabs per tablet      Take 1 tablet by mouth 2 times daily (with meals)        lisinopril-hydrochlorothiazide 10-12.5 MG tablet    PRINZIDE/ZESTORETIC     Take 1 tablet by mouth daily        magnesium gluconate 250 MG tablet    MAGONATE     Take by mouth daily        mineral oil-hydrophilic petrolatum external ointment           * nicotine 14 MG/24HR 24 hr patch    NICODERM CQ      "PLACE 1 PATCH ONTO THE SKIN EVERY 24 HOURS. DO NOT CUT PATCH; ROTATE SITES.        * nicotine 21 MG/24HR 24 hr patch    NICODERM CQ          order for DME     21 days    Equipment being ordered: Wound care supplies, 1 each daily x 21 days Xeroform occlusive gauze 5\" x 9\" Telfa non-adherent pad 8\" x 3\" Kerlix bandage roll 4-1/2\" x 4-1/8 yd ACE wrap, 4 inch (5 total)  Diagnosis: clear cell adenocarcinoma    Clear cell carcinoma (H)       simvastatin 40 MG tablet    ZOCOR     Take 40 mg by mouth At Bedtime        XEROFORM PETROLATUM DRESSING Pads     30 each    5x9\" dressing to forearm daily    Encounter for change or removal of surgical wound dressing       * Notice:  This list has 2 medication(s) that are the same as other medications prescribed for you. Read the directions carefully, and ask your doctor or other care provider to review them with you.      "

## 2018-12-05 NOTE — MR AVS SNAPSHOT
After Visit Summary   12/5/2018    Ms. 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)    -  1       Follow-ups after your visit        Your next 10 appointments already scheduled     Dec 06, 2018 11:30 AM CST   Treatment with [facility]   [facility] Radiation Oncology ([facility] )    [address] [address] [phone]            Dec 07, 2018 11:30 AM CST   Treatment with [facility]   [facility] Radiation Oncology ([facility] )    [address] [address] [phone]            Dec 10, 2018 11:30 AM CST   Treatment with [facility]   [facility] Radiation Oncology ([facility] )    [address] [address]   [phone]            Dec 11, 2018 11:30 AM CST   Treatment with [facility]   [facility]cology ([facility] )    [address] [address]   [phone]            Dec 12, 2018 11:30 AM CST   Treatment with [facility]   [facility] Radiation Oncology ([facility] )    [address] [address] [phone]            Dec 12, 2018 11:45 AM CST   on treatment visit with Dorian Weaver MD   [facility] Radiation Oncology ([facility] )    [address] [id number] [phone]            Dec 13, 2018 11:30 AM CST   Treatment with [facility]   [facility] Radiation Oncology ([facility] )    [address] [address]   [phone]            Dec 14, 2018 11:30 AM CST   Treatment with [facility]   [facility] Radiation Oncology ([facility] )    [address] [id number] [phone]            Dec 17, 2018 11:30 AM CST   Treatment with [facility]   HI  Radiation Oncology ([facility] )    [address] [address] [phone]            Dec 18, 2018 11:30 AM CST   Treatment with [facility]   [facility] Radiation Oncology ([facility] )    [address] [address] [phone]              Who to contact     If you have questions or need follow up information about today's clinic visit or your schedule please contact [facility] directly at [phone].  Normal or non-critical lab and imaging results will be communicated to you by eTelemetryhart, letter or phone within 4 business days after the clinic has received the results. If you do not hear from us within 7 days, please contact the clinic through Hammerhead Navigationt or phone. If you have a critical or abnormal lab result, we will notify you by phone as soon as possible.  Submit refill requests through OralWise or call your pharmacy and they will forward the refill request to us. Please allow 3 business days for your refill to be completed.          Additional Information About Your Visit        OralWise Information     OralWise gives you secure access to your electronic health record. If you see a primary care provider, you can also send messages to your care team and make appointments. If you have questions, please call your primary care clinic.  If you do not have a primary care provider, please call [phone] and they will assist you.        Care EveryWhere ID     This is your Care EveryWhere ID. This could be used by other organizations to access your [place] medical records  [id number]         Blood Pressure from Last 3 Encounters:   11/28/18 120/78   11/21/18 122/70   11/20/18 123/79    Weight from Last 3 Encounters:   11/28/18 93.4 kg (206 lb)   11/21/18 93.4 kg (206 lb)   11/20/18 93 kg (205 lb)              Today, you had the following     No orders found for display       Primary Care Provider Office Phone # Fax #    Jolly ErickRock [phone]  [phone]       [facility] [address] [facility] [address]        Equal Access to Services     JJ CARRION : Hadii aad ku hadsaundraharpal Osiris, waderekda soniatimha, jodie shayaneveline geovannyfelxibola, karolina solorzano belemjorge smallcharlotte robynamos jessy urena. So [facility] [phone].    ATENCIÓN: Si habla español, tiene a robison disposición servicios gratuitos de asistencia lingüística. Daniellaame al [phone].    We comply with applicable federal civil rights laws and [place] laws. We do not discriminate on the basis of race, color, national origin, age, disability, sex, sexual orientation, or gender identity.            Thank you!     Thank you for choosing [facility]  for your care. Our goal is always to provide you with excellent care. Hearing back from our patients is one way we can continue to improve our services. Please take a few minutes to complete the written survey that you may receive in the mail after your visit with us. Thank you!             Your Updated Medication List - Protect others around you: Learn how to safely use, store and throw away your medicines at www.disposemymeds.org.          This list is accurate as of 12/5/18 11:52 AM.  Always use your most recent med list.                   Brand Name Dispense Instructions for use Diagnosis    acetaminophen 500 MG tablet    TYLENOL     Take 1,000 mg by mouth daily as needed        aspirin 325 MG tablet    ASA     Take 325 mg by mouth daily        Calcium-Magnesium-Zinc 333-133-5 MG Tabs per tablet      Take 1 tablet by mouth 2 times daily (with meals)        lisinopril-hydrochlorothiazide 10-12.5 MG tablet    PRINZIDE/ZESTORETIC     Take 1 tablet by mouth daily        magnesium gluconate 250 MG tablet    MAGONATE     Take by mouth daily        mineral oil-hydrophilic petrolatum external ointment           * nicotine 14 MG/24HR 24 hr patch    NICODERM CQ     PLACE 1 PATCH ONTO THE SKIN EVERY 24 HOURS. DO NOT CUT PATCH; ROTATE SITES.        * nicotine 21 MG/24HR 24  "hr patch    NICODERM           order for DME     21 days    Equipment being ordered: Wound care supplies, 1 each daily x 21 days Xeroform occlusive gauze 5\" x 9\" Telfa non-adherent pad 8\" x 3\" Kerlix bandage roll 4-1/2\" x 4-1/8 yd ACE wrap, 4 inch (5 total)  Diagnosis: clear cell adenocarcinoma    Clear cell carcinoma (H)       simvastatin 40 MG tablet    ZOCOR     Take 40 mg by mouth At Bedtime        XEROFORM PETROLATUM DRESSING Pads     30 each    5x9\" dressing to forearm daily    Encounter for change or removal of surgical wound dressing       * Notice:  This list has 2 medication(s) that are the same as other medications prescribed for you. Read the directions carefully, and ask your doctor or other care provider to review them with you.      "

## 2018-12-05 NOTE — PROGRESS NOTES
Service Date: 2018      RADIATION PROGRESS NOTE       REFERRING PROVIDER:   Jolly Rodas NP      DIAGNOSIS:  Carcinoma of the oral cavity, myoepithelial carcinoma, stage [id number].      RADIATION RX:  The patient has received 2200 cGy to date with postoperative adjuvant intent for the above-described oral cavity carcinoma.      SUBJECTIVE:  Progressive mucositis as well as irritation of the graft area.      OBJECTIVE:  Weight 204 pounds.  Examination of the oral cavity reveals some mucositis as well as a definite epithelial effect on her graft.      IMPRESSION:  Routine tolerance to the above adjuvant therapy.      PLAN:  Continue treatment as planned.         MARTIN ROWE MD             D: 2018   T: 2018   MT: ALEXANDRE      Name:     LUCA HOLDER   MRN:      [id number]        Account:      [id number]   :      1955           Service Date: 2018      Document: [id number]       cc: Jolly Rodas NP

## 2018-12-06 ENCOUNTER — ALLIED HEALTH/NURSE VISIT (OUTPATIENT)
Dept: RADIATION ONCOLOGY | Facility: HOSPITAL | Age: 63
End: 2018-12-06

## 2018-12-06 DIAGNOSIS — C06.9 SQUAMOUS CELL CARCINOMA OF ORAL CAVITY (H): Primary | ICD-10-CM

## 2018-12-06 PROCEDURE — 77387 GUIDANCE FOR RADJ TX DLVR: CPT | Performed by: RADIOLOGY

## 2018-12-06 PROCEDURE — 77412 RADIATION TX DELIVERY LVL 3: CPT | Performed by: RADIOLOGY

## 2018-12-06 NOTE — PROGRESS NOTES
Martita Burden received radiation therapy treatment today 12/06/18.    Chace Lawrence  December 6, 2018  11:43 AM

## 2018-12-06 NOTE — MR AVS SNAPSHOT
After Visit Summary   12/6/2018    Ms. 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)    -  1       Follow-ups after your visit        Your next 10 appointments already scheduled     Dec 07, 2018 11:30 AM CST   Treatment with [facility]   HI Radiation Oncology ([facility] )    [address] [id number] [phone]            Dec 10, 2018 11:30 AM CST   Treatment with [facility]   HI Radiation Oncology ([facility] )    [address] [address] [phone]            Dec 11, 2018 11:30 AM CST   Treatment with [facility]   [facility] Radiation Oncology ([facility] )    [address] [address] [phone]            Dec 12, 2018 11:30 AM CST   Treatment with [facility]   [facility] Radiation Oncology ([facility] )    [address] [id number] [phone]            Dec 12, 2018 11:45 AM CST   on treatment visit with Dorian Weaver MD   [facility] Radiation Oncology ([facility] )    [address] [address] [phone]            Dec 13, 2018 11:30 AM CST   Treatment with [facility]   [facility] Radiation Oncology ([facility] )    [address] [id number] [phone]            Dec 14, 2018 11:30 AM CST   Treatment with [facility]   [facility] Radiation Oncology ([facility] )    [address] [id number] [phone]            Dec 17, 2018 11:30 AM CST   Treatment with [facility]   [facility] Radiation Oncology ([facility] )    [address] [id number] [phone]            Dec 18, 2018 11:30 AM CST   Treatment with [facility]   HI  Radiation Oncology ([facility] )    [address] [address] [phone]            Dec 19, 2018 11:30 AM CST   Treatment with [facility]   [facility] Radiation Oncology ([facility] )    [address] [address] [phone]              Who to contact     If you have questions or need follow up information about today's clinic visit or your schedule please contact [facility] directly at [phone].  Normal or non-critical lab and imaging results will be communicated to you by Genymobilehart, letter or phone within 4 business days after the clinic has received the results. If you do not hear from us within 7 days, please contact the clinic through WorkFusion (previously [employer])t or phone. If you have a critical or abnormal lab result, we will notify you by phone as soon as possible.  Submit refill requests through SimpliField or call your pharmacy and they will forward the refill request to us. Please allow 3 business days for your refill to be completed.          Additional Information About Your Visit        SimpliField Information     SimpliField gives you secure access to your electronic health record. If you see a primary care provider, you can also send messages to your care team and make appointments. If you have questions, please call your primary care clinic.  If you do not have a primary care provider, please call [phone] and they will assist you.        Care EveryWhere ID     This is your Care EveryWhere ID. This could be used by other organizations to access your [place] medical records  [id number]         Blood Pressure from Last 3 Encounters:   12/05/18 112/78   11/28/18 120/78   11/21/18 122/70    Weight from Last 3 Encounters:   12/05/18 92.5 kg (204 lb)   11/28/18 93.4 kg (206 lb)   11/21/18 93.4 kg (206 lb)              Today, you had the following     No orders found for display       Primary Care Provider Office Phone # Fax #    Jolly Adrianna [phone]  [phone]       [facility] [address] [facility] [address]        Equal Access to Services     JJ CARRION : Hadii aad ku hadsaundraharpal Osiris, waderekda soniatimha, jodie shayaneveline geovannyfelixbola, karolina solorzano belemjorge smallcharlotte robynamos jessy urena. So [facility] [phone].    ATENCIÓN: Si habla español, tiene a robison disposición servicios gratuitos de asistencia lingüística. Daniellaame al [phone].    We comply with applicable federal civil rights laws and [place] laws. We do not discriminate on the basis of race, color, national origin, age, disability, sex, sexual orientation, or gender identity.            Thank you!     Thank you for choosing [facility]  for your care. Our goal is always to provide you with excellent care. Hearing back from our patients is one way we can continue to improve our services. Please take a few minutes to complete the written survey that you may receive in the mail after your visit with us. Thank you!             Your Updated Medication List - Protect others around you: Learn how to safely use, store and throw away your medicines at www.disposemymeds.org.          This list is accurate as of 12/6/18 11:46 AM.  Always use your most recent med list.                   Brand Name Dispense Instructions for use Diagnosis    acetaminophen 500 MG tablet    TYLENOL     Take 1,000 mg by mouth daily as needed        aspirin 325 MG tablet    ASA     Take 325 mg by mouth daily        Calcium-Magnesium-Zinc 333-133-5 MG Tabs per tablet      Take 1 tablet by mouth 2 times daily (with meals)        lisinopril-hydrochlorothiazide 10-12.5 MG tablet    PRINZIDE/ZESTORETIC     Take 1 tablet by mouth daily        magnesium gluconate 250 MG tablet    MAGONATE     Take by mouth daily        mineral oil-hydrophilic petrolatum external ointment           * nicotine 14 MG/24HR 24 hr patch    NICODERM CQ     PLACE 1 PATCH ONTO THE SKIN EVERY 24 HOURS. DO NOT CUT PATCH; ROTATE SITES.        * nicotine 21 MG/24HR 24  "hr patch    NICODERM           order for DME     21 days    Equipment being ordered: Wound care supplies, 1 each daily x 21 days Xeroform occlusive gauze 5\" x 9\" Telfa non-adherent pad 8\" x 3\" Kerlix bandage roll 4-1/2\" x 4-1/8 yd ACE wrap, 4 inch (5 total)  Diagnosis: clear cell adenocarcinoma    Clear cell carcinoma (H)       simvastatin 40 MG tablet    ZOCOR     Take 40 mg by mouth At Bedtime        XEROFORM PETROLATUM DRESSING Pads     30 each    5x9\" dressing to forearm daily    Encounter for change or removal of surgical wound dressing       * Notice:  This list has 2 medication(s) that are the same as other medications prescribed for you. Read the directions carefully, and ask your doctor or other care provider to review them with you.      "

## 2018-12-07 ENCOUNTER — ALLIED HEALTH/NURSE VISIT (OUTPATIENT)
Dept: RADIATION ONCOLOGY | Facility: HOSPITAL | Age: 63
End: 2018-12-07

## 2018-12-07 DIAGNOSIS — C06.9 SQUAMOUS CELL CARCINOMA OF ORAL CAVITY (H): Primary | ICD-10-CM

## 2018-12-07 PROCEDURE — 77387 GUIDANCE FOR RADJ TX DLVR: CPT | Performed by: RADIOLOGY

## 2018-12-07 PROCEDURE — 77412 RADIATION TX DELIVERY LVL 3: CPT | Performed by: RADIOLOGY

## 2018-12-07 NOTE — MR AVS SNAPSHOT
After Visit Summary   12/7/2018    Ms. 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)    -  1       Follow-ups after your visit        Your next 10 appointments already scheduled     Dec 10, 2018 11:30 AM CST   Treatment with [facility]   [facility] Radiation Oncology ([facility] )    [address] [address] [phone]            Dec 11, 2018 11:30 AM CST   Treatment with [facility] IX   [facility] Radiation Oncology ([facility] )    [address] [address] [phone]            Dec 12, 2018 11:30 AM CST   Treatment with [facility]   [facility] Radiation Oncology ([facility] )    [address] [address] [phone]            Dec 12, 2018 11:45 AM CST   on treatment visit with Dorian Weaver MD   [facility] Radiation Oncology ([facility] )    [address] [address] [phone]            Dec 13, 2018 11:30 AM CST   Treatment with [facility]   [facility] Radiation Oncology ([facility] )    [address] [address] [phone]            Dec 14, 2018 11:30 AM CST   Treatment with [facility] IX   [facility] Radiation Oncology ([facility] )    [address] [address] [phone]            Dec 17, 2018 11:30 AM CST   Treatment with [facility]   [facility] Radiation Oncology ([facility] )    [address] [address] [phone]            Dec 18, 2018 11:30 AM CST   Treatment with HI CL[facility] IX   [facility] Radiation Oncology ([facility] )    [address] [address] [phone]            Dec 19, 2018 11:30 AM CST   Treatment with HI CL[facility] IX   HI  Radiation Oncology ([facility] )    [address] [address] [phone]            Dec 19, 2018 11:45 AM CST   on treatment visit with Dorian Weaver MD   [facility] Radiation Oncology ([facility] )    [address] [address] [phone]              Who to contact     If you have questions or need follow up information about today's clinic visit or your schedule please contact [facility] directly at [phone].  Normal or non-critical lab and imaging results will be communicated to you by Wotohart, letter or phone within 4 business days after the clinic has received the results. If you do not hear from us within 7 days, please contact the clinic through StreetfaireHDt or phone. If you have a critical or abnormal lab result, we will notify you by phone as soon as possible.  Submit refill requests through [email or web address] or call your pharmacy and they will forward the refill request to us. Please allow 3 business days for your refill to be completed.          Additional Information About Your Visit        Wotohart Information     [email or web address] gives you secure access to your electronic health record. If you see a primary care provider, you can also send messages to your care team and make appointments. If you have questions, please call your primary care clinic.  If you do not have a primary care provider, please call [phone] and they will assist you.        Care EveryWhere ID     This is your Care EveryWhere ID. This could be used by other organizations to access your [place] medical records  [id number]         Blood Pressure from Last 3 Encounters:   12/05/18 112/78   11/28/18 120/78   11/21/18 122/70    Weight from Last 3 Encounters:   12/05/18 92.5 kg (204 lb)   11/28/18 93.4 kg (206 lb)   11/21/18 93.4 kg (206 lb)              Today, you had the following     No orders found for display       Primary Care Provider Office Phone # Fax #    [facility] [phone]       [facility] [address] [facility] [address]        Equal Access to Services     JJ CARRION : Hadii aad ku hadsaundraharpal Osiris, nabil sonialucia, jodie kaeveline ding, karolina rafaelin hayaajorge smallcharlotte barragan jessy urena. So [facility] [phone].    ATENCIÓN: Si habla español, tiene a robison disposición servicios gratuitos de asistencia lingüística. Llame al [phone].    We comply with applicable federal civil rights laws and [place] laws. We do not discriminate on the basis of race, color, national origin, age, disability, sex, sexual orientation, or gender identity.            Thank you!     Thank you for choosing [facility]  for your care. Our goal is always to provide you with excellent care. Hearing back from our patients is one way we can continue to improve our services. Please take a few minutes to complete the written survey that you may receive in the mail after your visit with us. Thank you!             Your Updated Medication List - Protect others around you: Learn how to safely use, store and throw away your medicines at www.disposemymeds.org.          This list is accurate as of 12/7/18 11:33 AM.  Always use your most recent med list.                   Brand Name Dispense Instructions for use Diagnosis    acetaminophen 500 MG tablet    TYLENOL     Take 1,000 mg by mouth daily as needed        aspirin 325 MG tablet    ASA     Take 325 mg by mouth daily        Calcium-Magnesium-Zinc 333-133-5 MG Tabs per tablet      Take 1 tablet by mouth 2 times daily (with meals)        lisinopril-hydrochlorothiazide 10-12.5 MG tablet    PRINZIDE/ZESTORETIC     Take 1 tablet by mouth daily        magnesium gluconate 250 MG tablet    MAGONATE     Take by mouth daily        mineral oil-hydrophilic petrolatum external ointment           * nicotine 14 MG/24HR 24 hr patch    NICODERM CQ     PLACE 1 PATCH ONTO THE SKIN EVERY 24 HOURS. DO NOT CUT PATCH; ROTATE SITES.        *  "nicotine 21 MG/24HR 24 hr patch    NICODERM CQ          order for DME     21 days    Equipment being ordered: Wound care supplies, 1 each daily x 21 days Xeroform occlusive gauze 5\" x 9\" Telfa non-adherent pad 8\" x 3\" Kerlix bandage roll 4-1/2\" x 4-1/8 yd ACE wrap, 4 inch (5 total)  Diagnosis: clear cell adenocarcinoma    Clear cell carcinoma (H)       simvastatin 40 MG tablet    ZOCOR     Take 40 mg by mouth At Bedtime        XEROFORM PETROLATUM DRESSING Pads     30 each    5x9\" dressing to forearm daily    Encounter for change or removal of surgical wound dressing       * Notice:  This list has 2 medication(s) that are the same as other medications prescribed for you. Read the directions carefully, and ask your doctor or other care provider to review them with you.      "

## 2018-12-07 NOTE — PROGRESS NOTES
Martita Burden received radiation therapy treatment today 12/07/18.    Chace Lawrence  December 7, 2018  11:23 AM

## 2018-12-10 ENCOUNTER — ALLIED HEALTH/NURSE VISIT (OUTPATIENT)
Dept: RADIATION ONCOLOGY | Facility: HOSPITAL | Age: 63
End: 2018-12-10

## 2018-12-10 ENCOUNTER — RESULTS ONLY (OUTPATIENT)
Dept: RADIATION ONCOLOGY | Facility: HOSPITAL | Age: 63
End: 2018-12-10

## 2018-12-10 DIAGNOSIS — C06.9 SQUAMOUS CELL CARCINOMA OF ORAL CAVITY (H): Primary | ICD-10-CM

## 2018-12-10 LAB
RAD ONC ARIA COURSE ID: NORMAL
RAD ONC ARIA COURSE LAST TREATMENT DATE: NORMAL
RAD ONC ARIA COURSE START DATE: NORMAL
RAD ONC ARIA COURSE TREATMENT ELAPSED DAYS: 21
RAD ONC ARIA FIRST TREATMENT DATE: NORMAL
RAD ONC ARIA PLAN FRACTIONS TREATED TO DATE: 14
RAD ONC ARIA PLAN ID: NORMAL
RAD ONC ARIA PLAN NAME: NORMAL
RAD ONC ARIA PLAN PRESCRIBED DOSE PER FRACTION: 2 GY
RAD ONC ARIA PLAN TOTAL FRACTIONS PRESCRIBED: 30
RAD ONC ARIA PLAN TOTAL PRESCRIBED DOSE: 6000 CGY
RAD ONC ARIA REFERENCE POINT DOSAGE GIVEN TO DATE: NORMAL GY
RAD ONC ARIA REFERENCE POINT ID: NORMAL

## 2018-12-10 PROCEDURE — 77412 RADIATION TX DELIVERY LVL 3: CPT | Performed by: RADIOLOGY

## 2018-12-10 PROCEDURE — 77387 GUIDANCE FOR RADJ TX DLVR: CPT | Performed by: RADIOLOGY

## 2018-12-11 ENCOUNTER — ALLIED HEALTH/NURSE VISIT (OUTPATIENT)
Dept: RADIATION ONCOLOGY | Facility: HOSPITAL | Age: 63
End: 2018-12-11

## 2018-12-11 ENCOUNTER — RESULTS ONLY (OUTPATIENT)
Dept: RADIATION ONCOLOGY | Facility: HOSPITAL | Age: 63
End: 2018-12-11

## 2018-12-11 DIAGNOSIS — C06.9 SQUAMOUS CELL CARCINOMA OF ORAL CAVITY (H): Primary | ICD-10-CM

## 2018-12-11 LAB
RAD ONC ARIA COURSE ID: NORMAL
RAD ONC ARIA COURSE LAST TREATMENT DATE: NORMAL
RAD ONC ARIA COURSE START DATE: NORMAL
RAD ONC ARIA COURSE TREATMENT ELAPSED DAYS: 22
RAD ONC ARIA FIRST TREATMENT DATE: NORMAL
RAD ONC ARIA PLAN FRACTIONS TREATED TO DATE: 15
RAD ONC ARIA PLAN ID: NORMAL
RAD ONC ARIA PLAN NAME: NORMAL
RAD ONC ARIA PLAN PRESCRIBED DOSE PER FRACTION: 2 GY
RAD ONC ARIA PLAN TOTAL FRACTIONS PRESCRIBED: 30
RAD ONC ARIA PLAN TOTAL PRESCRIBED DOSE: 6000 CGY
RAD ONC ARIA REFERENCE POINT DOSAGE GIVEN TO DATE: NORMAL GY
RAD ONC ARIA REFERENCE POINT ID: NORMAL

## 2018-12-11 PROCEDURE — 77336 RADIATION PHYSICS CONSULT: CPT | Performed by: RADIOLOGY

## 2018-12-11 PROCEDURE — 77412 RADIATION TX DELIVERY LVL 3: CPT | Performed by: RADIOLOGY

## 2018-12-11 PROCEDURE — 77387 GUIDANCE FOR RADJ TX DLVR: CPT | Performed by: RADIOLOGY

## 2018-12-11 NOTE — PROGRESS NOTES
Martita Burden received radiation therapy treatment today 12/11/18.    Adria Montemayor  December 11, 2018  11:48 AM

## 2018-12-12 ENCOUNTER — OFFICE VISIT (OUTPATIENT)
Dept: RADIATION ONCOLOGY | Facility: HOSPITAL | Age: 63
End: 2018-12-12

## 2018-12-12 ENCOUNTER — ALLIED HEALTH/NURSE VISIT (OUTPATIENT)
Dept: RADIATION ONCOLOGY | Facility: HOSPITAL | Age: 63
End: 2018-12-12

## 2018-12-12 ENCOUNTER — RESULTS ONLY (OUTPATIENT)
Dept: RADIATION ONCOLOGY | Facility: HOSPITAL | Age: 63
End: 2018-12-12

## 2018-12-12 VITALS
BODY MASS INDEX: 33.78 KG/M2 | WEIGHT: 203 LBS | SYSTOLIC BLOOD PRESSURE: 118 MMHG | RESPIRATION RATE: 16 BRPM | HEART RATE: 76 BPM | DIASTOLIC BLOOD PRESSURE: 72 MMHG

## 2018-12-12 DIAGNOSIS — C06.9 SQUAMOUS CELL CARCINOMA OF ORAL CAVITY (H): Primary | ICD-10-CM

## 2018-12-12 LAB
RAD ONC ARIA COURSE ID: NORMAL
RAD ONC ARIA COURSE LAST TREATMENT DATE: NORMAL
RAD ONC ARIA COURSE START DATE: NORMAL
RAD ONC ARIA COURSE TREATMENT ELAPSED DAYS: 23
RAD ONC ARIA FIRST TREATMENT DATE: NORMAL
RAD ONC ARIA PLAN FRACTIONS TREATED TO DATE: 16
RAD ONC ARIA PLAN ID: NORMAL
RAD ONC ARIA PLAN NAME: NORMAL
RAD ONC ARIA PLAN PRESCRIBED DOSE PER FRACTION: 2 GY
RAD ONC ARIA PLAN TOTAL FRACTIONS PRESCRIBED: 30
RAD ONC ARIA PLAN TOTAL PRESCRIBED DOSE: 6000 CGY
RAD ONC ARIA REFERENCE POINT DOSAGE GIVEN TO DATE: NORMAL GY
RAD ONC ARIA REFERENCE POINT ID: NORMAL

## 2018-12-12 PROCEDURE — 77412 RADIATION TX DELIVERY LVL 3: CPT | Performed by: RADIOLOGY

## 2018-12-12 PROCEDURE — 77387 GUIDANCE FOR RADJ TX DLVR: CPT | Performed by: RADIOLOGY

## 2018-12-12 ASSESSMENT — PAIN SCALES - GENERAL: PAINLEVEL: NO PAIN (0)

## 2018-12-12 NOTE — PROGRESS NOTES
Martita Burden received radiation therapy treatment today 12/12/18.    Lizzy Mccray  December 12, 2018  11:48 AM

## 2018-12-13 ENCOUNTER — TELEPHONE (OUTPATIENT)
Dept: CARDIOLOGY | Facility: OTHER | Age: 63
End: 2018-12-13

## 2018-12-13 ENCOUNTER — RESULTS ONLY (OUTPATIENT)
Dept: RADIATION ONCOLOGY | Facility: HOSPITAL | Age: 63
End: 2018-12-13

## 2018-12-13 ENCOUNTER — ALLIED HEALTH/NURSE VISIT (OUTPATIENT)
Dept: RADIATION ONCOLOGY | Facility: HOSPITAL | Age: 63
End: 2018-12-13

## 2018-12-13 DIAGNOSIS — C06.9 SQUAMOUS CELL CARCINOMA OF ORAL CAVITY (H): Primary | ICD-10-CM

## 2018-12-13 LAB
RAD ONC ARIA COURSE ID: NORMAL
RAD ONC ARIA COURSE LAST TREATMENT DATE: NORMAL
RAD ONC ARIA COURSE START DATE: NORMAL
RAD ONC ARIA COURSE TREATMENT ELAPSED DAYS: 24
RAD ONC ARIA FIRST TREATMENT DATE: NORMAL
RAD ONC ARIA PLAN FRACTIONS TREATED TO DATE: 17
RAD ONC ARIA PLAN ID: NORMAL
RAD ONC ARIA PLAN NAME: NORMAL
RAD ONC ARIA PLAN PRESCRIBED DOSE PER FRACTION: 2 GY
RAD ONC ARIA PLAN TOTAL FRACTIONS PRESCRIBED: 30
RAD ONC ARIA PLAN TOTAL PRESCRIBED DOSE: 6000 CGY
RAD ONC ARIA REFERENCE POINT DOSAGE GIVEN TO DATE: NORMAL GY
RAD ONC ARIA REFERENCE POINT ID: NORMAL

## 2018-12-13 PROCEDURE — 77412 RADIATION TX DELIVERY LVL 3: CPT | Performed by: RADIOLOGY

## 2018-12-13 PROCEDURE — 77387 GUIDANCE FOR RADJ TX DLVR: CPT | Performed by: RADIOLOGY

## 2018-12-13 NOTE — TELEPHONE ENCOUNTER
"Call placed to patient due to she was a no-show for her Lexiscan and Zio patch on 11/29/2018.  Patient states that she \"had transportation problems and that her ride didn't show up that day\".  Patient is requesting to reschedule appointment for diagnostic tests.  Call placed to Mariajose in Diagnostic imaging who states that she will call patient tonight to reschedule these tests.  "

## 2018-12-13 NOTE — PROGRESS NOTES
Martita Burden received radiation therapy treatment today 12/13/18.    Adria Montemayor  December 13, 2018  11:26 AM

## 2018-12-14 ENCOUNTER — ALLIED HEALTH/NURSE VISIT (OUTPATIENT)
Dept: RADIATION ONCOLOGY | Facility: HOSPITAL | Age: 63
End: 2018-12-14

## 2018-12-14 ENCOUNTER — OFFICE VISIT (OUTPATIENT)
Dept: RADIATION ONCOLOGY | Facility: HOSPITAL | Age: 63
End: 2018-12-14

## 2018-12-14 ENCOUNTER — RESULTS ONLY (OUTPATIENT)
Dept: RADIATION ONCOLOGY | Facility: HOSPITAL | Age: 63
End: 2018-12-14

## 2018-12-14 DIAGNOSIS — C06.9 SQUAMOUS CELL CARCINOMA OF ORAL CAVITY (H): Primary | ICD-10-CM

## 2018-12-14 LAB
RAD ONC ARIA COURSE ID: NORMAL
RAD ONC ARIA COURSE LAST TREATMENT DATE: NORMAL
RAD ONC ARIA COURSE START DATE: NORMAL
RAD ONC ARIA COURSE TREATMENT ELAPSED DAYS: 25
RAD ONC ARIA FIRST TREATMENT DATE: NORMAL
RAD ONC ARIA PLAN FRACTIONS TREATED TO DATE: 18
RAD ONC ARIA PLAN ID: NORMAL
RAD ONC ARIA PLAN NAME: NORMAL
RAD ONC ARIA PLAN PRESCRIBED DOSE PER FRACTION: 2 GY
RAD ONC ARIA PLAN TOTAL FRACTIONS PRESCRIBED: 30
RAD ONC ARIA PLAN TOTAL PRESCRIBED DOSE: 6000 CGY
RAD ONC ARIA REFERENCE POINT DOSAGE GIVEN TO DATE: NORMAL GY
RAD ONC ARIA REFERENCE POINT ID: NORMAL

## 2018-12-14 PROCEDURE — 77412 RADIATION TX DELIVERY LVL 3: CPT | Performed by: RADIOLOGY

## 2018-12-14 PROCEDURE — 77387 GUIDANCE FOR RADJ TX DLVR: CPT | Performed by: RADIOLOGY

## 2018-12-14 NOTE — PROGRESS NOTES
Martita Burden received radiation therapy treatment today 12/14/18.    Nakul Del Cid  December 14, 2018  11:47 AM

## 2018-12-14 NOTE — PROGRESS NOTES
Service Date: 2018      RADIATION PROGRESS NOTE      REFERRING PROVIDERS:  Jolly Rodas NP and Leslie Garza MD      DIAGNOSIS:  Carcinoma of the oral cavity, myoepithelial carcinoma, stage T4a N0 M0.      RADIATION RX:  The patient has received 3600 cGy to date with postoperative adjuvant intent for the above-described oral cavity carcinoma.      SUBJECTIVE:  Doing very well with her treatment.  She has some mucositis, but it is well managed with salt and baking soda.      OBJECTIVE:  Weight 203 pounds.  Oral cavity exhibits the expected mucositis and epithelial disruption of the graft.      IMPRESSION:  Routine tolerance to adjuvant radiation therapy to myoepithelial carcinoma of the oral cavity.      PLAN:  Continue treatment as planned.         MARTIN ROWE MD             D: 2018   T: 2018   MT: CARMENCITA      Name:     LUCA HOLDER   MRN:      [id number]        Account:      [id number]   :      1955           Service Date: 2018      Document: [id number]

## 2018-12-17 ENCOUNTER — RESULTS ONLY (OUTPATIENT)
Dept: RADIATION ONCOLOGY | Facility: HOSPITAL | Age: 63
End: 2018-12-17

## 2018-12-17 ENCOUNTER — ALLIED HEALTH/NURSE VISIT (OUTPATIENT)
Dept: RADIATION ONCOLOGY | Facility: HOSPITAL | Age: 63
End: 2018-12-17

## 2018-12-17 DIAGNOSIS — C06.9 SQUAMOUS CELL CARCINOMA OF ORAL CAVITY (H): Primary | ICD-10-CM

## 2018-12-17 LAB
RAD ONC ARIA COURSE ID: NORMAL
RAD ONC ARIA COURSE LAST TREATMENT DATE: NORMAL
RAD ONC ARIA COURSE START DATE: NORMAL
RAD ONC ARIA COURSE TREATMENT ELAPSED DAYS: 28
RAD ONC ARIA FIRST TREATMENT DATE: NORMAL
RAD ONC ARIA PLAN FRACTIONS TREATED TO DATE: 19
RAD ONC ARIA PLAN ID: NORMAL
RAD ONC ARIA PLAN NAME: NORMAL
RAD ONC ARIA PLAN PRESCRIBED DOSE PER FRACTION: 2 GY
RAD ONC ARIA PLAN TOTAL FRACTIONS PRESCRIBED: 30
RAD ONC ARIA PLAN TOTAL PRESCRIBED DOSE: 6000 CGY
RAD ONC ARIA REFERENCE POINT DOSAGE GIVEN TO DATE: NORMAL GY
RAD ONC ARIA REFERENCE POINT ID: NORMAL

## 2018-12-17 PROCEDURE — 77387 GUIDANCE FOR RADJ TX DLVR: CPT | Performed by: RADIOLOGY

## 2018-12-17 PROCEDURE — 77412 RADIATION TX DELIVERY LVL 3: CPT | Performed by: RADIOLOGY

## 2018-12-17 NOTE — PROGRESS NOTES
Martita Burden received radiation therapy treatment today 12/17/18.    Lizzy Mccray  December 17, 2018  11:42 AM

## 2018-12-18 ENCOUNTER — RESULTS ONLY (OUTPATIENT)
Dept: RADIATION ONCOLOGY | Facility: HOSPITAL | Age: 63
End: 2018-12-18

## 2018-12-18 ENCOUNTER — ALLIED HEALTH/NURSE VISIT (OUTPATIENT)
Dept: RADIATION ONCOLOGY | Facility: HOSPITAL | Age: 63
End: 2018-12-18

## 2018-12-18 DIAGNOSIS — C06.9 SQUAMOUS CELL CARCINOMA OF ORAL CAVITY (H): Primary | ICD-10-CM

## 2018-12-18 LAB
RAD ONC ARIA COURSE ID: NORMAL
RAD ONC ARIA COURSE LAST TREATMENT DATE: NORMAL
RAD ONC ARIA COURSE START DATE: NORMAL
RAD ONC ARIA COURSE TREATMENT ELAPSED DAYS: 29
RAD ONC ARIA FIRST TREATMENT DATE: NORMAL
RAD ONC ARIA PLAN FRACTIONS TREATED TO DATE: 20
RAD ONC ARIA PLAN ID: NORMAL
RAD ONC ARIA PLAN NAME: NORMAL
RAD ONC ARIA PLAN PRESCRIBED DOSE PER FRACTION: 2 GY
RAD ONC ARIA PLAN TOTAL FRACTIONS PRESCRIBED: 30
RAD ONC ARIA PLAN TOTAL PRESCRIBED DOSE: 6000 CGY
RAD ONC ARIA REFERENCE POINT DOSAGE GIVEN TO DATE: NORMAL GY
RAD ONC ARIA REFERENCE POINT ID: NORMAL

## 2018-12-18 PROCEDURE — 77336 RADIATION PHYSICS CONSULT: CPT | Performed by: RADIOLOGY

## 2018-12-18 PROCEDURE — 77412 RADIATION TX DELIVERY LVL 3: CPT | Performed by: RADIOLOGY

## 2018-12-18 PROCEDURE — 77387 GUIDANCE FOR RADJ TX DLVR: CPT | Performed by: RADIOLOGY

## 2018-12-18 NOTE — PROCEDURES
Martita Burden received radiation therapy treatment today 12/18/18.    Leida Walter  December 18, 2018  11:40 AM

## 2018-12-19 ENCOUNTER — OFFICE VISIT (OUTPATIENT)
Dept: RADIATION ONCOLOGY | Facility: HOSPITAL | Age: 63
End: 2018-12-19

## 2018-12-19 ENCOUNTER — RESULTS ONLY (OUTPATIENT)
Dept: RADIATION ONCOLOGY | Facility: HOSPITAL | Age: 63
End: 2018-12-19

## 2018-12-19 ENCOUNTER — ALLIED HEALTH/NURSE VISIT (OUTPATIENT)
Dept: RADIATION ONCOLOGY | Facility: HOSPITAL | Age: 63
End: 2018-12-19

## 2018-12-19 VITALS
DIASTOLIC BLOOD PRESSURE: 64 MMHG | BODY MASS INDEX: 33.61 KG/M2 | HEART RATE: 80 BPM | SYSTOLIC BLOOD PRESSURE: 100 MMHG | RESPIRATION RATE: 16 BRPM | WEIGHT: 202 LBS

## 2018-12-19 DIAGNOSIS — C06.9 SQUAMOUS CELL CARCINOMA OF ORAL CAVITY (H): Primary | ICD-10-CM

## 2018-12-19 LAB
RAD ONC ARIA COURSE ID: NORMAL
RAD ONC ARIA COURSE LAST TREATMENT DATE: NORMAL
RAD ONC ARIA COURSE START DATE: NORMAL
RAD ONC ARIA COURSE TREATMENT ELAPSED DAYS: 30
RAD ONC ARIA FIRST TREATMENT DATE: NORMAL
RAD ONC ARIA PLAN FRACTIONS TREATED TO DATE: 21
RAD ONC ARIA PLAN ID: NORMAL
RAD ONC ARIA PLAN NAME: NORMAL
RAD ONC ARIA PLAN PRESCRIBED DOSE PER FRACTION: 2 GY
RAD ONC ARIA PLAN TOTAL FRACTIONS PRESCRIBED: 30
RAD ONC ARIA PLAN TOTAL PRESCRIBED DOSE: 6000 CGY
RAD ONC ARIA REFERENCE POINT DOSAGE GIVEN TO DATE: NORMAL GY
RAD ONC ARIA REFERENCE POINT ID: NORMAL

## 2018-12-19 PROCEDURE — 77387 GUIDANCE FOR RADJ TX DLVR: CPT | Performed by: RADIOLOGY

## 2018-12-19 PROCEDURE — 77412 RADIATION TX DELIVERY LVL 3: CPT | Performed by: RADIOLOGY

## 2018-12-19 ASSESSMENT — PAIN SCALES - GENERAL: PAINLEVEL: NO PAIN (0)

## 2018-12-19 NOTE — PROGRESS NOTES
Martita Burden received radiation therapy treatment today 12/19/18.    Lizzy Mccray  December 19, 2018  11:42 AM

## 2018-12-19 NOTE — PROGRESS NOTES
Service Date: 2018      RADIATION THERAPY PROGRESS NOTE      REFERRING PHYSICIAN:  Jolly Rodas NP and Leslie Garza M.D.       DIAGNOSIS: Carcinoma of the oral cavity, myoepithelial stage T4a N0 M0.      The patient has received 4200 cGy to date with postoperative adjuvant intent for the above-described oral cavity carcinoma.        SUBJECTIVE:  Still tolerating her treatment very well.  She really has minimal oral cavity pain.        OBJECTIVE:   Weight 202 pounds.  Examination reveals the expected mucositis and also epithelial disruption on her graft.        IMPRESSION: Routine tolerance to adjuvant radiation therapy for myoepithelial carcinoma of the oral cavity.  Continue treatment as planned.         MARTIN ROWE MD             D: 2018   T: 2018   MT: BISHNU      Name:     LUCA HOLDER   MRN:      [id number]        Account:      [id number]   :      1955           Service Date: 2018      Document: [id number]

## 2018-12-20 ENCOUNTER — ALLIED HEALTH/NURSE VISIT (OUTPATIENT)
Dept: RADIATION ONCOLOGY | Facility: HOSPITAL | Age: 63
End: 2018-12-20

## 2018-12-20 ENCOUNTER — RESULTS ONLY (OUTPATIENT)
Dept: RADIATION ONCOLOGY | Facility: HOSPITAL | Age: 63
End: 2018-12-20

## 2018-12-20 DIAGNOSIS — C06.9 SQUAMOUS CELL CARCINOMA OF ORAL CAVITY (H): Primary | ICD-10-CM

## 2018-12-20 LAB
RAD ONC ARIA COURSE ID: NORMAL
RAD ONC ARIA COURSE LAST TREATMENT DATE: NORMAL
RAD ONC ARIA COURSE START DATE: NORMAL
RAD ONC ARIA COURSE TREATMENT ELAPSED DAYS: 31
RAD ONC ARIA FIRST TREATMENT DATE: NORMAL
RAD ONC ARIA PLAN FRACTIONS TREATED TO DATE: 22
RAD ONC ARIA PLAN ID: NORMAL
RAD ONC ARIA PLAN NAME: NORMAL
RAD ONC ARIA PLAN PRESCRIBED DOSE PER FRACTION: 2 GY
RAD ONC ARIA PLAN TOTAL FRACTIONS PRESCRIBED: 30
RAD ONC ARIA PLAN TOTAL PRESCRIBED DOSE: 6000 CGY
RAD ONC ARIA REFERENCE POINT DOSAGE GIVEN TO DATE: NORMAL GY
RAD ONC ARIA REFERENCE POINT ID: NORMAL

## 2018-12-20 PROCEDURE — 77387 GUIDANCE FOR RADJ TX DLVR: CPT | Performed by: RADIOLOGY

## 2018-12-20 PROCEDURE — 77412 RADIATION TX DELIVERY LVL 3: CPT | Performed by: RADIOLOGY

## 2018-12-20 NOTE — PROGRESS NOTES
Service Date: 2018      RADIATION THERAPY PROGRESS NOTE      REFERRING PROVIDERS:  Jolly Rodas NP and Leslie Garza M.D.       DIAGNOSIS: Carcinoma of the oral cavity, myoepithelial stage T4a N0 M0.      The patient has received 4200 cGy to date with postoperative adjuvant intent for the above-described oral cavity carcinoma.        SUBJECTIVE:  Still tolerating her treatment very well.  She really has minimal oral cavity pain.        OBJECTIVE:   Weight 202 pounds.  Examination reveals the expected mucositis and also epithelial disruption on her graft.        IMPRESSION: Routine tolerance to adjuvant radiation therapy for myoepithelial carcinoma of the oral cavity.  Continue treatment as planned.         MARTIN ROWE MD             D: 2018   T: 2018   MT: BISHNU      Name:     LUCA HOLDER   MRN:      [id number]        Account:      [id number]   :      1955           Service Date: 2018      Document: [id number]       cc: Jolly Garza MD

## 2018-12-20 NOTE — PROGRESS NOTES
Martita Burden received radiation therapy treatment today 12/20/18.    Lizzy Mccray  December 20, 2018  11:56 AM

## 2018-12-21 ENCOUNTER — RESULTS ONLY (OUTPATIENT)
Dept: RADIATION ONCOLOGY | Facility: HOSPITAL | Age: 63
End: 2018-12-21

## 2018-12-21 ENCOUNTER — ALLIED HEALTH/NURSE VISIT (OUTPATIENT)
Dept: RADIATION ONCOLOGY | Facility: HOSPITAL | Age: 63
End: 2018-12-21

## 2018-12-21 DIAGNOSIS — C06.9 SQUAMOUS CELL CARCINOMA OF ORAL CAVITY (H): Primary | ICD-10-CM

## 2018-12-21 LAB
RAD ONC ARIA COURSE ID: NORMAL
RAD ONC ARIA COURSE LAST TREATMENT DATE: NORMAL
RAD ONC ARIA COURSE START DATE: NORMAL
RAD ONC ARIA COURSE TREATMENT ELAPSED DAYS: 32
RAD ONC ARIA FIRST TREATMENT DATE: NORMAL
RAD ONC ARIA PLAN FRACTIONS TREATED TO DATE: 23
RAD ONC ARIA PLAN ID: NORMAL
RAD ONC ARIA PLAN NAME: NORMAL
RAD ONC ARIA PLAN PRESCRIBED DOSE PER FRACTION: 2 GY
RAD ONC ARIA PLAN TOTAL FRACTIONS PRESCRIBED: 30
RAD ONC ARIA PLAN TOTAL PRESCRIBED DOSE: 6000 CGY
RAD ONC ARIA REFERENCE POINT DOSAGE GIVEN TO DATE: NORMAL GY
RAD ONC ARIA REFERENCE POINT ID: NORMAL

## 2018-12-21 PROCEDURE — 77387 GUIDANCE FOR RADJ TX DLVR: CPT | Performed by: RADIOLOGY

## 2018-12-21 PROCEDURE — 77412 RADIATION TX DELIVERY LVL 3: CPT | Performed by: RADIOLOGY

## 2018-12-21 NOTE — PROCEDURES
Martita Burden received radiation therapy treatment today 12/21/18.    Leida Walter  December 21, 2018  11:39 AM

## 2018-12-26 ENCOUNTER — ALLIED HEALTH/NURSE VISIT (OUTPATIENT)
Dept: RADIATION ONCOLOGY | Facility: HOSPITAL | Age: 63
End: 2018-12-26

## 2018-12-26 ENCOUNTER — RESULTS ONLY (OUTPATIENT)
Dept: RADIATION ONCOLOGY | Facility: HOSPITAL | Age: 63
End: 2018-12-26

## 2018-12-26 DIAGNOSIS — C06.9 SQUAMOUS CELL CARCINOMA OF ORAL CAVITY (H): Primary | ICD-10-CM

## 2018-12-26 LAB
RAD ONC ARIA COURSE ID: NORMAL
RAD ONC ARIA COURSE LAST TREATMENT DATE: NORMAL
RAD ONC ARIA COURSE START DATE: NORMAL
RAD ONC ARIA COURSE TREATMENT ELAPSED DAYS: 37
RAD ONC ARIA FIRST TREATMENT DATE: NORMAL
RAD ONC ARIA PLAN FRACTIONS TREATED TO DATE: 25
RAD ONC ARIA PLAN ID: NORMAL
RAD ONC ARIA PLAN NAME: NORMAL
RAD ONC ARIA PLAN PRESCRIBED DOSE PER FRACTION: 2 GY
RAD ONC ARIA PLAN TOTAL FRACTIONS PRESCRIBED: 30
RAD ONC ARIA PLAN TOTAL PRESCRIBED DOSE: 6000 CGY
RAD ONC ARIA REFERENCE POINT DOSAGE GIVEN TO DATE: NORMAL GY
RAD ONC ARIA REFERENCE POINT ID: NORMAL

## 2018-12-26 PROCEDURE — 77387 GUIDANCE FOR RADJ TX DLVR: CPT | Performed by: RADIOLOGY

## 2018-12-26 PROCEDURE — 77336 RADIATION PHYSICS CONSULT: CPT | Performed by: RADIOLOGY

## 2018-12-26 PROCEDURE — 77412 RADIATION TX DELIVERY LVL 3: CPT | Performed by: RADIOLOGY

## 2018-12-26 NOTE — PROGRESS NOTES
Martita Burden received radiation therapy treatment today 12/26/18.  Beam on time: 1504    Adria Montemayor  December 26, 2018  3:04 PM

## 2018-12-26 NOTE — PROGRESS NOTES
Martita Burden received radiation therapy treatment today 12/26/18. Beam off time: 0844    Lizzy Mccray  December 26, 2018  8:34 AM

## 2018-12-27 ENCOUNTER — RESULTS ONLY (OUTPATIENT)
Dept: RADIATION ONCOLOGY | Facility: HOSPITAL | Age: 63
End: 2018-12-27

## 2018-12-27 ENCOUNTER — OFFICE VISIT (OUTPATIENT)
Dept: RADIATION ONCOLOGY | Facility: HOSPITAL | Age: 63
End: 2018-12-27

## 2018-12-27 ENCOUNTER — ALLIED HEALTH/NURSE VISIT (OUTPATIENT)
Dept: RADIATION ONCOLOGY | Facility: HOSPITAL | Age: 63
End: 2018-12-27

## 2018-12-27 VITALS
HEART RATE: 76 BPM | RESPIRATION RATE: 16 BRPM | DIASTOLIC BLOOD PRESSURE: 64 MMHG | SYSTOLIC BLOOD PRESSURE: 96 MMHG | WEIGHT: 199 LBS | BODY MASS INDEX: 33.12 KG/M2

## 2018-12-27 DIAGNOSIS — C06.9 SQUAMOUS CELL CARCINOMA OF ORAL CAVITY (H): Primary | ICD-10-CM

## 2018-12-27 LAB
RAD ONC ARIA COURSE ID: NORMAL
RAD ONC ARIA COURSE LAST TREATMENT DATE: NORMAL
RAD ONC ARIA COURSE START DATE: NORMAL
RAD ONC ARIA COURSE TREATMENT ELAPSED DAYS: 38
RAD ONC ARIA FIRST TREATMENT DATE: NORMAL
RAD ONC ARIA PLAN FRACTIONS TREATED TO DATE: 26
RAD ONC ARIA PLAN ID: NORMAL
RAD ONC ARIA PLAN NAME: NORMAL
RAD ONC ARIA PLAN PRESCRIBED DOSE PER FRACTION: 2 GY
RAD ONC ARIA PLAN TOTAL FRACTIONS PRESCRIBED: 30
RAD ONC ARIA PLAN TOTAL PRESCRIBED DOSE: 6000 CGY
RAD ONC ARIA REFERENCE POINT DOSAGE GIVEN TO DATE: NORMAL GY
RAD ONC ARIA REFERENCE POINT ID: NORMAL

## 2018-12-27 PROCEDURE — 77387 GUIDANCE FOR RADJ TX DLVR: CPT | Performed by: RADIOLOGY

## 2018-12-27 PROCEDURE — 77412 RADIATION TX DELIVERY LVL 3: CPT | Performed by: RADIOLOGY

## 2018-12-27 ASSESSMENT — PAIN SCALES - GENERAL: PAINLEVEL: NO PAIN (0)

## 2018-12-27 NOTE — PROGRESS NOTES
Martita Burden received radiation therapy treatment today 12/27/18.    Nakul Del Cid  December 27, 2018  11:38 AM

## 2018-12-31 ENCOUNTER — ALLIED HEALTH/NURSE VISIT (OUTPATIENT)
Dept: RADIATION ONCOLOGY | Facility: HOSPITAL | Age: 63
End: 2018-12-31

## 2018-12-31 ENCOUNTER — RESULTS ONLY (OUTPATIENT)
Dept: RADIATION ONCOLOGY | Facility: HOSPITAL | Age: 63
End: 2018-12-31

## 2018-12-31 DIAGNOSIS — C06.9 SQUAMOUS CELL CARCINOMA OF ORAL CAVITY (H): Primary | ICD-10-CM

## 2018-12-31 LAB
RAD ONC ARIA COURSE ID: NORMAL
RAD ONC ARIA COURSE LAST TREATMENT DATE: NORMAL
RAD ONC ARIA COURSE START DATE: NORMAL
RAD ONC ARIA COURSE TREATMENT ELAPSED DAYS: 42
RAD ONC ARIA FIRST TREATMENT DATE: NORMAL
RAD ONC ARIA PLAN FRACTIONS TREATED TO DATE: 27
RAD ONC ARIA PLAN ID: NORMAL
RAD ONC ARIA PLAN NAME: NORMAL
RAD ONC ARIA PLAN PRESCRIBED DOSE PER FRACTION: 2 GY
RAD ONC ARIA PLAN TOTAL FRACTIONS PRESCRIBED: 30
RAD ONC ARIA PLAN TOTAL PRESCRIBED DOSE: 6000 CGY
RAD ONC ARIA REFERENCE POINT DOSAGE GIVEN TO DATE: NORMAL GY
RAD ONC ARIA REFERENCE POINT ID: NORMAL

## 2018-12-31 PROCEDURE — 77387 GUIDANCE FOR RADJ TX DLVR: CPT | Performed by: RADIOLOGY

## 2018-12-31 PROCEDURE — 77412 RADIATION TX DELIVERY LVL 3: CPT | Performed by: RADIOLOGY

## 2018-12-31 NOTE — PROGRESS NOTES
Martita Burden received radiation therapy treatment today 12/31/18.    Lizzy Mccray  December 31, 2018  11:43 AM

## 2018-12-31 NOTE — PROGRESS NOTES
Service Date: 2018      RADIATION THERAPY PROGRESS NOTE      REFERRING PHYSICIAN:  Dr. Allyson Rodas NP and Leslie Garza MD      DIAGNOSIS:  Carcinoma of the oral cavity, myoepithelial, stage T4a N0.      RADIATION RX:  The patient has received 5200 cGy to date with postoperative adjuvant intent for the above-described oral cavity carcinoma.      SUBJECTIVE:  Still doing well.  She reports very little in the way of side effect symptomatology.  Minimal oral cavity pain.      OBJECTIVE:  Weight 199 pounds.  Examination again reveals the expected mucositis and epithelial desquamation of the graft.      IMPRESSION:  Excellent tolerance to adjuvant radiation therapy.  The patient has essentially no pain related to the desquamation of the graft due to its lack of innervation.      PLAN:  Continue treatment as planned.         MARTIN ROWE MD             D: 2018   T: 2018   MT: JOVITA      Name:     LUCA HOLDER   MRN:      [id number]        Account:      [id number]   :      1955           Service Date: 2018      Document: [id number]       cc: Allyson Garza MD

## 2019-01-02 ENCOUNTER — ALLIED HEALTH/NURSE VISIT (OUTPATIENT)
Dept: RADIATION ONCOLOGY | Facility: HOSPITAL | Age: 64
End: 2019-01-02
Attending: NURSE PRACTITIONER
Payer: COMMERCIAL

## 2019-01-02 ENCOUNTER — RESULTS ONLY (OUTPATIENT)
Dept: RADIATION ONCOLOGY | Facility: HOSPITAL | Age: 64
End: 2019-01-02

## 2019-01-02 ENCOUNTER — OFFICE VISIT (OUTPATIENT)
Dept: RADIATION ONCOLOGY | Facility: HOSPITAL | Age: 64
End: 2019-01-02
Payer: COMMERCIAL

## 2019-01-02 ENCOUNTER — ALLIED HEALTH/NURSE VISIT (OUTPATIENT)
Dept: RADIATION ONCOLOGY | Facility: HOSPITAL | Age: 64
End: 2019-01-02

## 2019-01-02 VITALS
RESPIRATION RATE: 16 BRPM | SYSTOLIC BLOOD PRESSURE: 120 MMHG | BODY MASS INDEX: 33.61 KG/M2 | HEART RATE: 76 BPM | DIASTOLIC BLOOD PRESSURE: 80 MMHG | WEIGHT: 202 LBS

## 2019-01-02 DIAGNOSIS — C06.9 SQUAMOUS CELL CARCINOMA OF ORAL CAVITY (H): Primary | ICD-10-CM

## 2019-01-02 LAB
RAD ONC ARIA COURSE ID: NORMAL
RAD ONC ARIA COURSE LAST TREATMENT DATE: NORMAL
RAD ONC ARIA COURSE START DATE: NORMAL
RAD ONC ARIA COURSE TREATMENT ELAPSED DAYS: 44
RAD ONC ARIA FIRST TREATMENT DATE: NORMAL
RAD ONC ARIA PLAN FRACTIONS TREATED TO DATE: 29
RAD ONC ARIA PLAN ID: NORMAL
RAD ONC ARIA PLAN NAME: NORMAL
RAD ONC ARIA PLAN PRESCRIBED DOSE PER FRACTION: 2 GY
RAD ONC ARIA PLAN TOTAL FRACTIONS PRESCRIBED: 30
RAD ONC ARIA PLAN TOTAL PRESCRIBED DOSE: 6000 CGY
RAD ONC ARIA REFERENCE POINT DOSAGE GIVEN TO DATE: NORMAL GY
RAD ONC ARIA REFERENCE POINT ID: NORMAL

## 2019-01-02 PROCEDURE — 77412 RADIATION TX DELIVERY LVL 3: CPT | Performed by: RADIOLOGY

## 2019-01-02 PROCEDURE — 77412 RADIATION TX DELIVERY LVL 3: CPT | Mod: XE | Performed by: RADIOLOGY

## 2019-01-02 PROCEDURE — 77387 GUIDANCE FOR RADJ TX DLVR: CPT | Performed by: RADIOLOGY

## 2019-01-02 ASSESSMENT — PAIN SCALES - GENERAL: PAINLEVEL: NO PAIN (0)

## 2019-01-02 NOTE — PROGRESS NOTES
Martita Burden received radiation therapy treatment today 01/02/19.  BEAM ON TIME: 1449.    Chace Lawrence  January 2, 2019  2:27 PM

## 2019-01-02 NOTE — PROGRESS NOTES
Martita Jenise received radiation therapy treatment today 01/02/19. Beam off time 0801hrs.    Lizzy Mccray  January 2, 2019  9:10 AM

## 2019-01-03 ENCOUNTER — RESULTS ONLY (OUTPATIENT)
Dept: RADIATION ONCOLOGY | Facility: HOSPITAL | Age: 64
End: 2019-01-03

## 2019-01-03 ENCOUNTER — ALLIED HEALTH/NURSE VISIT (OUTPATIENT)
Dept: RADIATION ONCOLOGY | Facility: HOSPITAL | Age: 64
End: 2019-01-03

## 2019-01-03 DIAGNOSIS — C06.9 SQUAMOUS CELL CARCINOMA OF ORAL CAVITY (H): Primary | ICD-10-CM

## 2019-01-03 LAB
RAD ONC ARIA COURSE ID: NORMAL
RAD ONC ARIA COURSE LAST TREATMENT DATE: NORMAL
RAD ONC ARIA COURSE START DATE: NORMAL
RAD ONC ARIA COURSE TREATMENT ELAPSED DAYS: 45
RAD ONC ARIA FIRST TREATMENT DATE: NORMAL
RAD ONC ARIA PLAN FRACTIONS TREATED TO DATE: 30
RAD ONC ARIA PLAN ID: NORMAL
RAD ONC ARIA PLAN NAME: NORMAL
RAD ONC ARIA PLAN PRESCRIBED DOSE PER FRACTION: 2 GY
RAD ONC ARIA PLAN TOTAL FRACTIONS PRESCRIBED: 30
RAD ONC ARIA PLAN TOTAL PRESCRIBED DOSE: 6000 CGY
RAD ONC ARIA REFERENCE POINT DOSAGE GIVEN TO DATE: NORMAL GY
RAD ONC ARIA REFERENCE POINT ID: NORMAL

## 2019-01-03 NOTE — PROGRESS NOTES
Service Date: 2019      RADIATION THERAPY PROGRESS NOTE      REFERRING PHYSICIANS:  Leslie Garza MD.      DIAGNOSIS:  Carcinoma of the oral cavity, myoepithelial stage T4a N0.      RADIATION RX:  The patient has received 5800 of a prescribed 6000 cGy to date with postoperative adjuvant intent for the above-described oral cavity carcinoma.      SUBJECTIVE:  Still doing well.  Reports very little in the way of pain, I am sure due to a lack of graft innervation.      OBJECTIVE:  Weight 202 pounds.  Examination again reveals the expected mucositis and epithelial desquamation of the graft.      IMPRESSION:  Excellent tolerance to radiation therapy.      PLAN:  The patient will be finished tomorrow, and I will plan to see her back in approximately 12 weeks for followup.         MARTIN ROWE MD             D: 2019   T: 2019   MT: GOPI      Name:     LUCA HOLDER   MRN:      [id number]        Account:      [id number]   :      1955           Service Date: 2019      Document: [id number]

## 2019-01-03 NOTE — PROGRESS NOTES
Martita Burden received radiation therapy treatment today 01/03/19.    Adria Montemayor  January 3, 2019  11:26 AM

## 2019-01-03 NOTE — PROGRESS NOTES
Service Date: 2019      RADIATION THERAPY TREATMENT SUMMARY       REFERRING PROVIDERS:  Jolly Rodas NP; Leslie Garza MD      DIAGNOSIS:  Carcinoma of the oral cavity, myoepithelial stage T4a N0.        TREATMENT INTENT:  Postoperative adjuvant.        REGION TREATED:  Oral cavity.        PRIMARY TECHNIQUE:  3D conformal.        ENERGY:  6 MV       TUMOR DOSE:  6000 cGy       NUMBER OF TREATMENTS:  30 fractions       COMPLETION DATE:  2019.        Ms. Holder was treated with postoperative adjuvant intent for a very locally advanced atypical oral cavity carcinoma.  She tolerated her treatment very well.  She had the expected brisk mucositis and epithelial reaction on her graft, but really tolerated things well and maintained adequate nutrition.  We will plan to see her back for routine followup in 12 weeks.         MARTIN ROWE MD             D: 2019   T: 2019   MT: SANDRA      Name:     LUCA HOLDER   MRN:      [id number]        Account:      [id number]   :      1955           Service Date: 2019      Document: [id number]       cc: Jolly Garza MD

## 2019-01-08 DIAGNOSIS — C06.9 SQUAMOUS CELL CARCINOMA OF ORAL CAVITY (H): Primary | ICD-10-CM

## 2019-01-08 NOTE — PROGRESS NOTES
Called and spoke with patient as she has now completed radiation therapy. Patient states that she is doing well since completion of treatment . She wishes to hold off on follow-up here at the [facility] until after the winter when she does not have to worry about driving in the snow. She will follow-up with Dr. Weaver in March and complete her 3 month PET scan at beginning of April. Writer called and spoke with nurse with Dr. Weaver who will help facilitate PET scan. Patient will then proceed with follow-up with Dr. Garza in April or May. Patient was encouraged to call with further questions or concerns.     Update on Follow-up plan sent to Dr. Garza.     Camryn Rivera, RN, BSN

## 2019-01-11 ENCOUNTER — TELEPHONE (OUTPATIENT)
Dept: CARDIOLOGY | Facility: OTHER | Age: 64
End: 2019-01-11

## 2019-01-11 NOTE — LETTER
January 11, 2019      Martita Burden  [address]  [place] [address]        Dear Martita,     Our records indicate that you have canceled your Nuclear Stress test and Zio Patch monitor. You scheduled follow up with Dr. Diggs on April 23, 2019 @ 4776 with Dr. Diggs is to discuss your test results. Please call [phone] at your earliest convenience  to schedule these appointments.         Sincerely,        Lucas Diggs MD

## 2019-04-01 ENCOUNTER — ONCOLOGY VISIT (OUTPATIENT)
Dept: RADIATION ONCOLOGY | Facility: HOSPITAL | Age: 64
End: 2019-04-01
Payer: COMMERCIAL

## 2019-04-01 VITALS
RESPIRATION RATE: 16 BRPM | HEART RATE: 72 BPM | SYSTOLIC BLOOD PRESSURE: 140 MMHG | DIASTOLIC BLOOD PRESSURE: 80 MMHG | BODY MASS INDEX: 33.45 KG/M2 | WEIGHT: 201 LBS

## 2019-04-01 DIAGNOSIS — C06.9 SQUAMOUS CELL CARCINOMA OF ORAL CAVITY (H): Primary | ICD-10-CM

## 2019-04-01 ASSESSMENT — PAIN SCALES - GENERAL: PAINLEVEL: NO PAIN (0)

## 2019-04-01 NOTE — PROGRESS NOTES
FOLLOW-UP VISIT    Patient Name: Martita Burden      : 1955     Age: 63 year old        ______________________________________________________________________________     Chief Complaint   Patient presents with     Radiation Therapy     /80 (BP Location: Left arm, Patient Position: Chair, Cuff Size: Adult Regular)   Pulse 72   Resp 16   Wt 91.2 kg (201 lb)   BMI 33.45 kg/m       Date Radiation Completed: 1/3/19    Pain  Denies    Labs  Other Labs: N/A    Imaging  None      Dental:   Most Recent Dental Visit: 2019  Varnish: Last application 2019    Speech/Swallowing:   Most Recent evaluation or testin2019  Swallowing Restrictions: No difficulties with swallowing    Trismus/Jaw Exercises: n/a    Nutrition:  Oral Intake: adequate  Weight:   Wt Readings from Last 3 Encounters:   19 91.2 kg (201 lb)   19 91.6 kg (202 lb)   18 90.3 kg (199 lb)         Oral Symptoms:   Xerostomia:0- None  Dysphagia: 0-None  Mucositis Oral Symptoms: 0-None  Mucositis: 0- None  Esophagitis:0- None    Pt is here for a follow up after completion of EBRT for oral cavity cancer.  Pt is doing very well.  Her only complaint is that she has some taste changes when it comes to sugary foods.  She no longer likes the taste of them.  Pt admits this may not be a bad thing.    Meek Manzanares RN

## 2019-04-01 NOTE — PROGRESS NOTES
"Service Date: 2019      RADIATION THERAPY PROGRESS NOTE      REFERRING PHYSICIANS:  Jolly Rodas NP and Leslie Garza MD.      DIAGNOSIS:  Carcinoma of the oral cavity, myoepithelial, stage T4a N0 M0.      RADIATION RX:  The patient received 6000 cGy total dose, with postoperative adjuvant intent for the above somewhat rare oral cavity carcinoma.      SUBJECTIVE:  She is recovering very well, all in all feeling quite fine.      OBJECTIVE:   VITAL SIGNS:  Weight 201.4 pounds.  Blood pressure 140/80, heart rate 72.   HEENT:  Extraocular movements full.  Pupils are equal, round, reactive.  Face symmetric.  Oral cavity exhibits a nicely healing mucositis in the right upper quadrant. It appears that the graft is covered with oral mucosa.    NECK:  Free of adenopathy.  She has been having a bit of \"stuffiness\" of the right ear.  Examination does reveal fluid behind the eardrum.      IMPRESSION:  Excellent recovery following surgery and radiation therapy for a somewhat unusual oral cavity carcinoma.  I talked to the patient about her serous otitis which is no doubt related to compromise of the eustachian tube. In terms of pressure equalization,  I suggested some exercises to try, Valsalva, yawning, etc. and also possibility of taking Sudafed to see if this will help the obstructive issues.      PLAN:  Routine 3-month surveillance.         MARTIN ROWE MD             D: 2019   T: 2019   MT: DOT      Name:     LUCA HOLDER   MRN:      [id number]        Account:      [id number]   :      1955           Service Date: 2019      Document: [id number]       cc: Jolly Garza MD     "

## 2019-04-04 ENCOUNTER — PATIENT OUTREACH (OUTPATIENT)
Dept: OTOLARYNGOLOGY | Facility: CLINIC | Age: 64
End: 2019-04-04

## 2019-04-04 NOTE — PROGRESS NOTES
Received a call from patient who states that she saw Dr. Weaver for follow-up and they would prefer that we schedule patient for PET scan. Patient is only able to complete the PET scan in [place] due to transportation. Called [place] [facility] scheduling and they will get the PET scan prior-auth and then call patient to schedule. They complete PET scans on Wednesday's and will be able to schedule patient for next week once it is approved.    Called patient and left message with this information and advised her that she should expect a call from [place] to schedule. Patient was encouraged to call with further questions or concerns.     Camryn Rivera, RN, BSN

## 2019-04-23 ENCOUNTER — TELEPHONE (OUTPATIENT)
Dept: PET IMAGING | Facility: HOSPITAL | Age: 64
End: 2019-04-23

## 2019-04-23 NOTE — TELEPHONE ENCOUNTER
Appointment Reminder Call    Spoke with patient  -Exam prep  -When and where to register    Patient understood

## 2019-04-24 ENCOUNTER — HOSPITAL ENCOUNTER (OUTPATIENT)
Dept: PET IMAGING | Facility: HOSPITAL | Age: 64
Discharge: HOME | End: 2019-04-24
Attending: OTOLARYNGOLOGY | Admitting: OTOLARYNGOLOGY
Payer: COMMERCIAL

## 2019-04-24 ENCOUNTER — DOCUMENTATION ONLY (OUTPATIENT)
Dept: OTHER | Facility: CLINIC | Age: 64
End: 2019-04-24

## 2019-04-24 DIAGNOSIS — C06.9 SQUAMOUS CELL CARCINOMA OF ORAL CAVITY (H): ICD-10-CM

## 2019-04-24 PROCEDURE — 34300033 ZZH RX 343: Performed by: OTOLARYNGOLOGY

## 2019-04-24 PROCEDURE — 78815 PET IMAGE W/CT SKULL-THIGH: CPT | Mod: TC,PS

## 2019-04-24 PROCEDURE — A9552 F18 FDG: HCPCS | Performed by: OTOLARYNGOLOGY

## 2019-04-24 RX ADMIN — FLUDEOXYGLUCOSE F-18 13.29 MCI.: 500 INJECTION, SOLUTION INTRAVENOUS at 09:11

## 2019-04-25 ENCOUNTER — PATIENT OUTREACH (OUTPATIENT)
Dept: OTOLARYNGOLOGY | Facility: CLINIC | Age: 64
End: 2019-04-25

## 2019-04-25 NOTE — PROGRESS NOTES
Called patient with the following PET scan results:    IMPRESSION:   1.  Postoperative changes involving the right side of the face without  evidence to suggest recurrent or residual disease.     2.  Increased FDG uptake in the posterior vertebral junctions of the  mid and upper ribs, suggesting localized inflammation. There is no  evidence of apparent destructive bony lesion.      Discussed with patient that she should return for follow-up with Dr. Garza. She states that it is very difficult for her to travel here and she would like to follow-up with local ENT provider in [place]. Patient will call and arrange clinic follow-up with local ENT provider. She will continue to see Dr. Weaver. She is due for repeat imaging with CT neck and Chest in October. She was encouraged to call with further questions or concerns.     Camryn Rivera, RN, BSN

## 2020-03-11 ENCOUNTER — HEALTH MAINTENANCE LETTER (OUTPATIENT)
Age: 65
End: 2020-03-11

## 2021-01-03 ENCOUNTER — HEALTH MAINTENANCE LETTER (OUTPATIENT)
Age: 66
End: 2021-01-03

## 2021-10-10 ENCOUNTER — HEALTH MAINTENANCE LETTER (OUTPATIENT)
Age: 66
End: 2021-10-10

## 2022-01-29 ENCOUNTER — HEALTH MAINTENANCE LETTER (OUTPATIENT)
Age: 67
End: 2022-01-29

## 2022-03-26 ENCOUNTER — HEALTH MAINTENANCE LETTER (OUTPATIENT)
Age: 67
End: 2022-03-26

## 2022-09-18 ENCOUNTER — HEALTH MAINTENANCE LETTER (OUTPATIENT)
Age: 67
End: 2022-09-18

## 2023-04-22 NOTE — PROGRESS NOTES
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 The current plan is 30 radiation treatments. She saw speech therapy earlier today and she believes that it will be helpful. Pt has knowledge about how she should go about trying to maintain her weight and some of the symptoms of radiation therapy. She already has some nutrition supplements at home. Typically eats 3x a day. Currently no difficulty chewing or swallowing. Has been following a mechanical soft diet.    Food Record  Breakfast  - oatmeal or cream of wheat  - egg whites with toast    Lunch  - Salad with grilled chicken, squash    Dinner  - veggie burger with mac and cheese and beans    Snacks  - not often    Beverage  - skim milk  - black tea  - water    Physical Activity   Stays active throughout the day. She can't stand sitting still.      Nutrition Prescription  Energy:   1800-2100kcal   MSJ stress factor 1.2-1.4     Protein:   65-80g (1.2-1.5g/kg IBW)               Nutrition Diagnosis:   Increased calorie and protein needs related to cancer treatment as evidenced by pt to begin radiation therapy for cancer of the oral cavity.    Nutrition Intervention:  Reviewed the following handouts:  - making the most of each bite  - swallowing difficulty- dysphagia  - nausea and vomiting  - taste changes      Nutrition Goals:   adequate intake to prevent weight loss    Nutrition Follow Up / Monitoring:   diet recall, weight, labs    Nutrition Recommendations:   2100kcal diet with daily activity    Time spent with pt: 15min    Patient to follow-up with RD in as needed.  Patient has RD contact information to call/email if needed.                    " Desi

## 2023-05-07 ENCOUNTER — HEALTH MAINTENANCE LETTER (OUTPATIENT)
Age: 68
End: 2023-05-07

## (undated) DEVICE — SYR 05ML LL W/O NDL

## (undated) DEVICE — CLIP APPLIER 09 3/8" SM LIGACLIP MCS20

## (undated) DEVICE — SUCTION MANIFOLD DORNOCH ULTRA CART UL-CL500

## (undated) DEVICE — SPONGE LAP 18X18" X8435

## (undated) DEVICE — TOURNIQUET CUFF 18" REPRO RED 60-7070-103

## (undated) DEVICE — Device

## (undated) DEVICE — LINEN TOWEL PACK X5 5464

## (undated) DEVICE — SU ETHILON 4-0 PC-3 18" 1864G

## (undated) DEVICE — PREP SKIN SCRUB TRAY 4461A

## (undated) DEVICE — DRSG XEROFORM 5X9" CUR253590W

## (undated) DEVICE — SUCTION SLEEVE NEPTUNE 2 125MM 0703-005-125

## (undated) DEVICE — SOL NACL 0.9% IRRIG 1000ML BOTTLE 2F7124

## (undated) DEVICE — ESU CORD BIPOLAR AND IRR TUBING AESCULAP US355

## (undated) DEVICE — SU SILK 2-0 TIE 12X30" A305H

## (undated) DEVICE — BLADE SAW SAGITTAL STRK MICRO 9.0X25X0.38MM 2296-003-511

## (undated) DEVICE — SU MONOSOF 9-0 MV135-4 N2546

## (undated) DEVICE — DRSG TEGADERM 4X10" 1627

## (undated) DEVICE — VESSEL LOOPS BLUE SUPERMAXI 011022PBX

## (undated) DEVICE — DRAPE POUCH INSTRUMENT 1018

## (undated) DEVICE — EYE INSTRUMENT WIPE MEROCEL W/ADHESIVE 223625

## (undated) DEVICE — ESU PENCIL SMOKE EVAC W/ROCKER SWITCH 0703-047-000

## (undated) DEVICE — SPONGE SURGIFOAM 100 1974

## (undated) DEVICE — SU VICRYL 3-0 SH 8X18" UND J864D

## (undated) DEVICE — DRSG BIATAIN ALGINATE 4X4" 3710

## (undated) DEVICE — NDL ANGIOCATH 22GA 1" 4050

## (undated) DEVICE — SU SILK 0 SH 30" K834H

## (undated) DEVICE — ESU GROUND PAD ADULT REM W/15' CORD E7507DB

## (undated) DEVICE — DRAIN PENROSE 1X18" LATEX FREE GR207

## (undated) DEVICE — GLOVE PROTEXIS MICRO 7.5  2D73PM75

## (undated) DEVICE — SYR 01ML 27GA 0.5" NDL TBC 309623

## (undated) DEVICE — DRAPE U SPLIT 74X120" 29440

## (undated) DEVICE — GLOVE PROTEXIS MICRO 7.0  2D73PM70

## (undated) DEVICE — DRAPE MICRO ZEISS PENTERO 120X54" G650DL

## (undated) DEVICE — CLIP HORIZON SM RED WIDE SLOT 001201

## (undated) DEVICE — SU ETHILON 3-0 PS-1 18" 1663H

## (undated) DEVICE — PITCHER STERILE 1000ML  SSK9004A

## (undated) DEVICE — GEL ULTRASOUND AQUASONIC 20GM 01-01

## (undated) DEVICE — DRAPE WARMER 66X44" ORS-300

## (undated) DEVICE — SU SILK 0 TIE 6X30" A306H

## (undated) DEVICE — BLADE SAW RECIP STRK PRECISION THIN 27X0.38MM 5100-137-233

## (undated) DEVICE — RETR ELASTIC STAYS LONE STAR BLUNT DUAL LEAD 3550-1G

## (undated) DEVICE — SU CHROMIC 4-0 SH 27" G121H

## (undated) DEVICE — DRAPE SHEET MED 44X70" 9355

## (undated) DEVICE — SYR BULB IRRIG 50ML LATEX FREE 0035280

## (undated) DEVICE — EYE PREP BETADINE 5% SOLUTION 30ML 0065-0411-30

## (undated) DEVICE — GLOVE PROTEXIS MICRO 6.0  2D73PM60

## (undated) DEVICE — TONGUE DEPRESSOR STERILE 6023

## (undated) DEVICE — EYE SPONGE SPEAR WECK CEL 0008685

## (undated) DEVICE — NDL 25GA 1.5" 305127

## (undated) DEVICE — SYR 03ML LL W/O NDL 309657

## (undated) DEVICE — DRAPE STOCKINETTE IMPERVIOUS 10" 21048

## (undated) DEVICE — LABEL MEDICATION SYSTEM 3303-P

## (undated) DEVICE — STRAP UNIVERSAL POSITIONING 2-PIECE 4X47X76" 91-287

## (undated) DEVICE — LINEN TOWEL PACK X6 WHITE 5487

## (undated) DEVICE — BASIN EMESIS STERILE  SSK9005A

## (undated) DEVICE — SOL WATER IRRIG 1000ML BOTTLE 2F7114

## (undated) DEVICE — SYR EAR BULB 3OZ 0035830

## (undated) DEVICE — DRAIN JACKSON PRATT RESERVOIR 400ML SU130-1000

## (undated) DEVICE — CABLE DOPPLER FLOW EXTENSION  DP-CAB01

## (undated) DEVICE — SU SILK 3-0 X-1 18" 632G

## (undated) DEVICE — PACK NEURO MINOR UMMC SNE32MNMU4

## (undated) DEVICE — CLIP GEM MICRO GEM2431

## (undated) DEVICE — LINEN TOWEL PACK X30 5481

## (undated) DEVICE — DRAPE SHEET REV FOLD 3/4 9349

## (undated) DEVICE — SPONGE KITTNER 30-101

## (undated) DEVICE — CATH TRAY FOLEY SURESTEP 16FR W/TMP PRB STLK LATEX A319416AM

## (undated) DEVICE — SU PROLENE 5-0 P-3 18" 8698G

## (undated) DEVICE — CUP AND LID 2PK 2OZ STERILE  SSK9006A

## (undated) DEVICE — DRSG TEGADERM 8X12" 1629

## (undated) DEVICE — VESSEL LOOPS RED MINI 31145710

## (undated) DEVICE — CLIP HORIZON MED BLUE 002200

## (undated) DEVICE — SU SILK 3-0 TIE 12X30" A304H

## (undated) DEVICE — PACK SET-UP STD 9102

## (undated) DEVICE — ESU ELEC BLADE 2.75" COATED/INSULATED E1455

## (undated) DEVICE — PREP POVIDONE IODINE SCRUB 7.5% 4OZ APL82212

## (undated) DEVICE — DRSG WOUND VAC SPONGE MED BLACK M8275052/5

## (undated) DEVICE — DRSG TELFA 3X8" 1238

## (undated) DEVICE — CLIP APPLIER 11" MED LIGACLIP MCM30

## (undated) DEVICE — ESU CORD BIPOLAR GREEN 10-4000

## (undated) DEVICE — SU SILK 4-0 TIE 12X30" A303H

## (undated) DEVICE — NDL ANGIOCATH 20GA 1.75" 4059

## (undated) DEVICE — PREP POVIDONE IODINE SOLUTION 10% 4OZ

## (undated) DEVICE — WIPE INSTRUMENT MEROCEL 400200

## (undated) DEVICE — BLADE DERMATOME ZIMMER  00-8800-000-10

## (undated) DEVICE — NDL COUNTER 40CT  31142311

## (undated) DEVICE — SU SILK 2-0 SH CR 5X18" C0125

## (undated) DEVICE — BUR STRK EGG 4.0X9.5X54MM 10 FLUTE 1608-002-035

## (undated) DEVICE — TUBE NASOGASTRIC ENTRIFLEX 12FR 43"

## (undated) DEVICE — VESSEL LOOPS YELLOW MAXI 31145694

## (undated) DEVICE — BLADE KNIFE SURG 15 371115

## (undated) DEVICE — CANISTER WOUND VAC W/GEL 1000ML M8275093/5

## (undated) DEVICE — DRAPE MAYO STAND 23X54 8337

## (undated) DEVICE — DRAIN JACKSON PRATT 10MM FLAT 4/4 PERF SU130-1311

## (undated) DEVICE — SPONGE RAY-TEC 4X8" 7318

## (undated) DEVICE — GLOVE PROTEXIS BLUE W/NEU-THERA 6.5  2D73EB65

## (undated) RX ORDER — CHLORHEXIDINE GLUCONATE ORAL RINSE 1.2 MG/ML
SOLUTION DENTAL
Status: DISPENSED
Start: 2018-09-20

## (undated) RX ORDER — ACETAMINOPHEN 325 MG/1
TABLET ORAL
Status: DISPENSED
Start: 2018-09-20

## (undated) RX ORDER — LIDOCAINE HYDROCHLORIDE 20 MG/ML
INJECTION, SOLUTION EPIDURAL; INFILTRATION; INTRACAUDAL; PERINEURAL
Status: DISPENSED
Start: 2018-09-20

## (undated) RX ORDER — CALCIUM CHLORIDE 100 MG/ML
INJECTION INTRAVENOUS; INTRAVENTRICULAR
Status: DISPENSED
Start: 2018-09-20

## (undated) RX ORDER — NEOSTIGMINE METHYLSULFATE 1 MG/ML
VIAL (ML) INJECTION
Status: DISPENSED
Start: 2018-09-20

## (undated) RX ORDER — LIDOCAINE HYDROCHLORIDE AND EPINEPHRINE 10; 10 MG/ML; UG/ML
INJECTION, SOLUTION INFILTRATION; PERINEURAL
Status: DISPENSED
Start: 2018-09-20

## (undated) RX ORDER — ALBUMIN, HUMAN INJ 5% 5 %
SOLUTION INTRAVENOUS
Status: DISPENSED
Start: 2018-09-20

## (undated) RX ORDER — FENTANYL CITRATE 50 UG/ML
INJECTION, SOLUTION INTRAMUSCULAR; INTRAVENOUS
Status: DISPENSED
Start: 2018-09-20

## (undated) RX ORDER — GABAPENTIN 300 MG/1
CAPSULE ORAL
Status: DISPENSED
Start: 2018-09-20

## (undated) RX ORDER — OXYMETAZOLINE HYDROCHLORIDE 0.05 G/100ML
SPRAY NASAL
Status: DISPENSED
Start: 2018-09-20

## (undated) RX ORDER — PAPAVERINE HYDROCHLORIDE 30 MG/ML
INJECTION INTRAMUSCULAR; INTRAVENOUS
Status: DISPENSED
Start: 2018-09-20

## (undated) RX ORDER — GLYCOPYRROLATE 0.2 MG/ML
INJECTION, SOLUTION INTRAMUSCULAR; INTRAVENOUS
Status: DISPENSED
Start: 2018-09-20

## (undated) RX ORDER — MINERAL OIL
OIL (ML) MISCELLANEOUS
Status: DISPENSED
Start: 2018-09-20

## (undated) RX ORDER — EPINEPHRINE NASAL SOLUTION 1 MG/ML
SOLUTION NASAL
Status: DISPENSED
Start: 2018-09-20

## (undated) RX ORDER — DEXAMETHASONE SODIUM PHOSPHATE 4 MG/ML
INJECTION, SOLUTION INTRA-ARTICULAR; INTRALESIONAL; INTRAMUSCULAR; INTRAVENOUS; SOFT TISSUE
Status: DISPENSED
Start: 2018-09-20

## (undated) RX ORDER — PROPOFOL 10 MG/ML
INJECTION, EMULSION INTRAVENOUS
Status: DISPENSED
Start: 2018-09-20

## (undated) RX ORDER — BALANCED SALT SOLUTION 6.4; .75; .48; .3; 3.9; 1.7 MG/ML; MG/ML; MG/ML; MG/ML; MG/ML; MG/ML
SOLUTION OPHTHALMIC
Status: DISPENSED
Start: 2018-09-20